# Patient Record
Sex: MALE | Race: WHITE | Employment: OTHER | URBAN - METROPOLITAN AREA
[De-identification: names, ages, dates, MRNs, and addresses within clinical notes are randomized per-mention and may not be internally consistent; named-entity substitution may affect disease eponyms.]

---

## 2017-07-10 ENCOUNTER — HOSPITAL ENCOUNTER (EMERGENCY)
Age: 77
Discharge: HOME | End: 2017-07-10
Attending: EMERGENCY MEDICINE
Payer: MEDICARE

## 2017-07-10 ENCOUNTER — APPOINTMENT (OUTPATIENT)
Dept: GENERAL RADIOLOGY | Age: 77
End: 2017-07-10
Attending: PHYSICIAN ASSISTANT
Payer: MEDICARE

## 2017-07-10 VITALS
RESPIRATION RATE: 16 BRPM | TEMPERATURE: 97.6 F | HEART RATE: 66 BPM | OXYGEN SATURATION: 94 % | DIASTOLIC BLOOD PRESSURE: 70 MMHG | SYSTOLIC BLOOD PRESSURE: 152 MMHG | WEIGHT: 220 LBS | HEIGHT: 70 IN | BODY MASS INDEX: 31.5 KG/M2

## 2017-07-10 DIAGNOSIS — J20.9 ACUTE BRONCHITIS, UNSPECIFIED ORGANISM: Primary | ICD-10-CM

## 2017-07-10 LAB
ANION GAP BLD CALC-SCNC: 11 MMOL/L (ref 3–18)
BASOPHILS # BLD AUTO: 0 K/UL (ref 0–0.06)
BASOPHILS # BLD: 0 % (ref 0–2)
BNP SERPL-MCNC: 1242 PG/ML (ref 0–1800)
BUN SERPL-MCNC: 16 MG/DL (ref 7–18)
BUN/CREAT SERPL: 15 (ref 12–20)
CALCIUM SERPL-MCNC: 8.8 MG/DL (ref 8.5–10.1)
CHLORIDE SERPL-SCNC: 105 MMOL/L (ref 100–108)
CK MB CFR SERPL CALC: 1.5 % (ref 0–4)
CK MB SERPL-MCNC: 2.6 NG/ML (ref 5–25)
CK SERPL-CCNC: 169 U/L (ref 39–308)
CO2 SERPL-SCNC: 26 MMOL/L (ref 21–32)
CREAT SERPL-MCNC: 1.1 MG/DL (ref 0.6–1.3)
DIFFERENTIAL METHOD BLD: ABNORMAL
EOSINOPHIL # BLD: 0.1 K/UL (ref 0–0.4)
EOSINOPHIL NFR BLD: 2 % (ref 0–5)
ERYTHROCYTE [DISTWIDTH] IN BLOOD BY AUTOMATED COUNT: 13.9 % (ref 11.6–14.5)
GLUCOSE SERPL-MCNC: 92 MG/DL (ref 74–99)
HCT VFR BLD AUTO: 41.3 % (ref 36–48)
HGB BLD-MCNC: 14.4 G/DL (ref 13–16)
LYMPHOCYTES # BLD AUTO: 14 % (ref 21–52)
LYMPHOCYTES # BLD: 1.3 K/UL (ref 0.9–3.6)
MCH RBC QN AUTO: 33.3 PG (ref 24–34)
MCHC RBC AUTO-ENTMCNC: 34.9 G/DL (ref 31–37)
MCV RBC AUTO: 95.6 FL (ref 74–97)
MONOCYTES # BLD: 0.6 K/UL (ref 0.05–1.2)
MONOCYTES NFR BLD AUTO: 7 % (ref 3–10)
NEUTS SEG # BLD: 7.5 K/UL (ref 1.8–8)
NEUTS SEG NFR BLD AUTO: 77 % (ref 40–73)
PLATELET # BLD AUTO: 164 K/UL (ref 135–420)
PMV BLD AUTO: 9.8 FL (ref 9.2–11.8)
POTASSIUM SERPL-SCNC: 3.5 MMOL/L (ref 3.5–5.5)
RBC # BLD AUTO: 4.32 M/UL (ref 4.7–5.5)
SODIUM SERPL-SCNC: 142 MMOL/L (ref 136–145)
TROPONIN I SERPL-MCNC: 0.03 NG/ML (ref 0–0.06)
WBC # BLD AUTO: 9.6 K/UL (ref 4.6–13.2)

## 2017-07-10 PROCEDURE — 83880 ASSAY OF NATRIURETIC PEPTIDE: CPT | Performed by: PHYSICIAN ASSISTANT

## 2017-07-10 PROCEDURE — 99283 EMERGENCY DEPT VISIT LOW MDM: CPT

## 2017-07-10 PROCEDURE — 93005 ELECTROCARDIOGRAM TRACING: CPT

## 2017-07-10 PROCEDURE — 71020 XR CHEST PA LAT: CPT

## 2017-07-10 PROCEDURE — 82550 ASSAY OF CK (CPK): CPT | Performed by: PHYSICIAN ASSISTANT

## 2017-07-10 PROCEDURE — 80048 BASIC METABOLIC PNL TOTAL CA: CPT | Performed by: PHYSICIAN ASSISTANT

## 2017-07-10 PROCEDURE — 85025 COMPLETE CBC W/AUTO DIFF WBC: CPT | Performed by: PHYSICIAN ASSISTANT

## 2017-07-10 RX ORDER — AZITHROMYCIN 250 MG/1
TABLET, FILM COATED ORAL
Qty: 6 TAB | Refills: 0 | Status: SHIPPED | OUTPATIENT
Start: 2017-07-10 | End: 2017-07-15

## 2017-07-10 RX ORDER — ASPIRIN 325 MG
325 TABLET ORAL DAILY
Status: ON HOLD | COMMUNITY
End: 2021-01-13

## 2017-07-10 RX ORDER — CODEINE PHOSPHATE AND GUAIFENESIN 10; 100 MG/5ML; MG/5ML
5 SOLUTION ORAL
Qty: 120 ML | Refills: 0 | Status: SHIPPED | OUTPATIENT
Start: 2017-07-10 | End: 2017-10-23

## 2017-07-10 NOTE — ED PROVIDER NOTES
[address]  EMERGENCY DEPARTMENT HISTORY AND PHYSICAL EXAM       Date: 7/10/2017   Patient Name: Lorin Bradford   YOB: 1940  Medical Record Number: [id number]    History of Presenting Illness     Chief Complaint   Patient presents with    Cough        History Provided By:  patient    Additional History: 2:08 PM  Lorin Bradford is a 78 y.o. male who presents to the emergency department with wife C/O cough productive of sputum onset 4 days ago. Associated signs and sx include post nasal drainage and chest congestion. Pt explains he was out in heat last week and reports he developed a productive cough 4 days ago with postnasal drainage. Pt called PCP and stated he felt like there was an elephant on his chest and was told to go to ED. Pt has had MI in past, but reports that this does not feel cardiac in origin. Denies needing to have used inhalers except for one occasion. Pt reports an allergy to Percocet. Normal stress test last year, per pt. PMHx of HTN, HLD, Cancer, Arthritis, Left knee DJD, and CAD. PSHx of Coronary stent placement, tonsillectomy, appendectomy, shoulder replacement, back surgery, elbow surgery, carpal tunnel surgery, and left partial knee replacement. Pt is a former smoker and an EtOH user. Pt denies chest pain, fever, arthralgias, myalgias, sneezing, sore throat, and any other associated signs and sx.     Primary Care Provider: Teetee Tilley MD   Specialist:    Past History     Past Medical History:   Past Medical History:   Diagnosis Date    Arthritis     CAD (coronary artery disease)     stent    Cancer ([facility] [address]     skin    Essential hypertension     H/O seasonal allergies     Hyperlipidemia     Hypertension 2004    Left knee DJD 7/5/2014        Past Surgical History:   Past Surgical History:   Procedure Laterality Date    CARDIAC SURG PROCEDURE UNLIST      stent placement    HX APPENDECTOMY      HX BACK SURGERY  2011    HX CORONARY STENT PLACEMENT  2009    HX ORTHOPAEDIC      right elbow    HX ORTHOPAEDIC      carpal tunnel     HX ORTHOPAEDIC      left partial knee    HX SHOULDER REPLACEMENT  2009    HX TONSILLECTOMY          Family History:   History reviewed. No pertinent family history. Social History:   Social History   Substance Use Topics    Smoking status: Former Smoker     Quit date: 2/25/1969    Smokeless tobacco: Never Used    Alcohol use 0.5 oz/week     1 Cans of beer per week      Comment: 5 x year        Allergies: Allergies   Allergen Reactions    Percocet [Oxycodone-Acetaminophen] Nausea and Vomiting        Review of Systems   Review of Systems   Constitutional: Negative for fever. HENT: Positive for congestion and postnasal drip. Negative for sneezing and sore throat. Respiratory: Positive for cough. Cardiovascular: Negative for chest pain. Musculoskeletal: Negative for arthralgias and myalgias. All other systems reviewed and are negative. Physical Exam  Vitals:    07/10/17 1358 07/10/17 1530   BP: 134/68 152/70   Pulse: 76 66   Resp: 16 16   Temp: 97.6 °F (36.4 °C)    SpO2: 96% 94%   Weight: 99.8 kg (220 lb)    Height: 5' 10\" (1.778 m)        Physical Exam   Nursing note and vitals reviewed. Vital signs and nursing notes reviewed. CONSTITUTIONAL: Alert. Well-appearing; well-nourished; in no apparent distress. HEAD: Normocephalic; atraumatic. EYES: PERRL; EOM's intact. No nystagmus. Conjunctiva clear. ENT: TM's normal. External ear normal. Normal nose; no rhinorrhea. Normal pharynx. Tonsils not enlarged without exudate. Moist mucus membranes. NECK: Supple; FROM without difficulty, non-tender; no cervical lymphadenopathy. CV: Normal S1, S2; no murmurs, rubs, or gallops. No chest wall tenderness. RESPIRATORY: Normal chest excursion with respiration; breath sounds clear and equal bilaterally; no wheezes, rhonchi, or rales.   GI: Normal bowel sounds; non-distended; non-tender; no guarding or rigidity; no palpable organomegaly. No CVA tenderness. BACK:  No evidence of trauma or deformity. Non-tender to palpation. FROM without difficulty. Negative straight leg raise bilaterally. EXT: Normal ROM in all four extremities; non-tender to palpation. No edema. Normal cap refill. SKIN: Normal for age and race; warm; dry; good turgor; no apparent lesions or exudate. NEURO: A & O x3. Cranial nerves 2-12 intact. Motor 5/5 bilaterally. Sensation intact. PSYCH:  Mood and affect appropriate. Diagnostic Study Results     Labs -      No results found for this or any previous visit (from the past 12 hour(s)). Radiologic Studies -  The following have been ordered and reviewed:  XR CHEST PA LAT   Final Result   IMPRESSION:     1. Cardiomegaly with nonspecific bilateral lower lung interstitial markings and  small pericardial effusion. Differential considerations include reactive versus  infectious inflammatory bronchointerstitial process. As read by the radiologist.         Medical Decision Making   I am the first provider for this patient. I reviewed the vital signs, available nursing notes, past medical history, past surgical history, family history and social history. Vital Signs-Reviewed the patient's vital signs. No data found. Pulse Oximetry Analysis - Normal 96% on room air     EKG interpretation: (Preliminary)  2:38 PM   Sinus rhythm with 1st degree AV block at 67 bpm. RBBB. Unchanged from 6/17/14. EKG read by Remington Craven MD and [employer], PA-C at 2:40 PM     Old Medical Records: Nursing notes. Procedures:   Procedures    ED Course:  2:08 PM  Initial assessment performed. The patients presenting problems have been discussed, and they are in agreement with the care plan formulated and outlined with them. I have encouraged them to ask questions as they arise throughout their visit.     Medications Given in the ED:  Medications - No data to display    Discharge Note:  3:55 PM  Pt has been reexamined. Patient has no new complaints, changes, or physical findings. Care plan outlined and precautions discussed. Results were reviewed with the patient. All medications were reviewed with the patient; will d/c home with Cheratussin and Zithromax. All of pt's questions and concerns were addressed. Patient was instructed and agrees to follow up with PCP, as well as to return to the ED upon further deterioration. Patient is ready to go home. Diagnosis   Clinical Impression:   1. Acute bronchitis, unspecified organism         Follow-up Information     Follow up With Details Comments Contact Info    Lara Franco MD Schedule an appointment as soon as possible for a visit For [place] [id number] [facility] [address]      [facility] EMERGENCY DEPT Go to As needed, If symptoms worsen [address] [id number]  [phone]          Discharge Medication List as of 7/10/2017  3:57 PM      START taking these medications    Details   guaiFENesin-codeine (CHERATUSSIN AC) 100-10 mg/5 mL solution Take 5 mL by mouth three (3) times daily as needed for Cough. Max Daily Amount: 15 mL. , Print, Disp-120 mL, R-0      azithromycin (ZITHROMAX Z-BRYCE) 250 mg tablet Use per pack instructions. , Print, Disp-6 Tab, R-0         CONTINUE these medications which have NOT CHANGED    Details   aspirin (ASPIRIN) 325 mg tablet Take 325 mg by mouth daily. , Historical Med      fish oil-omega-3 fatty acids 340-1,000 mg capsule Take 1 Cap by mouth daily. , Historical Med      rosuvastatin (CRESTOR) 10 mg tablet Take 10 mg by mouth nightly. Historical Med, 10 mg      multivitamin (ONE A DAY) tablet Take 1 Tab by mouth daily. Historical Med, 1 Tab      valsartan-hydrochlorothiazide (DIOVAN HCT) 160-25 mg per tablet Take 1 Tab by mouth daily. Historical Med, 1 Tab             _______________________________   Attestations:    This note is prepared by Joanna Jo, acting as a Scribe for Oracio Mcclure PA-C on 2:02 PM on 7/10/2017. Oracio Mcclure PA-C: The scribe's documentation has been prepared under my direction and personally reviewed by me in its entirety.   _______________________________

## 2017-07-10 NOTE — DISCHARGE INSTRUCTIONS
Bronchitis: Care Instructions  Your Care Instructions    Bronchitis is inflammation of the bronchial tubes, which carry air to the lungs. The tubes swell and produce mucus, or phlegm. The mucus and inflamed bronchial tubes make you cough. You may have trouble breathing. Most cases of bronchitis are caused by viruses like those that cause colds. Antibiotics usually do not help and they may be harmful. Bronchitis usually develops rapidly and lasts about 2 to 3 weeks in otherwise healthy people. Follow-up care is a key part of your treatment and safety. Be sure to make and go to all appointments, and call your doctor if you are having problems. It's also a good idea to know your test results and keep a list of the medicines you take. How can you care for yourself at home? · Take all medicines exactly as prescribed. Call your doctor if you think you are having a problem with your medicine. · Get some extra rest.  · Take an over-the-counter pain medicine, such as acetaminophen (Tylenol), ibuprofen (Advil, Motrin), or naproxen (Aleve) to reduce fever and relieve body aches. Read and follow all instructions on the label. · Do not take two or more pain medicines at the same time unless the doctor told you to. Many pain medicines have acetaminophen, which is Tylenol. Too much acetaminophen (Tylenol) can be harmful. · Take an over-the-counter cough medicine that contains dextromethorphan to help quiet a dry, hacking cough so that you can sleep. Avoid cough medicines that have more than one active ingredient. Read and follow all instructions on the label. · Breathe moist air from a humidifier, hot shower, or sink filled with hot water. The heat and moisture will thin mucus so you can cough it out. · Do not smoke. Smoking can make bronchitis worse. If you need help quitting, talk to your doctor about stop-smoking programs and medicines. These can increase your chances of quitting for good.   When should you call for help? Call 911 anytime you think you may need emergency care. For example, call if:  · You have severe trouble breathing. Call your doctor now or seek immediate medical care if:  · You have new or worse trouble breathing. · You cough up dark brown or bloody mucus (sputum). · You have a new or higher fever. · You have a new rash. Watch closely for changes in your health, and be sure to contact your doctor if:  · You cough more deeply or more often, especially if you notice more mucus or a change in the color of your mucus. · You are not getting better as expected. Where can you learn more? Go to http://juan-jojo.info/. Enter H333 in the search box to learn more about \"Bronchitis: Care Instructions. \"  Current as of: March 25, 2017  Content Version: 11.3  © 2739-8910 [employer]. Care instructions adapted under license by [facility] (which disclaims liability or warranty for this information). If you have questions about a medical condition or this instruction, always ask your healthcare professional. [address] any warranty or liability for your use of this information.

## 2017-07-10 NOTE — ED TRIAGE NOTES
Pt states out in heat last week, pt reports he has developed a productive cough on Thursday, pt states having postnasal drainage

## 2017-07-11 LAB
ATRIAL RATE: 67 BPM
CALCULATED P AXIS, ECG09: 50 DEGREES
CALCULATED R AXIS, ECG10: -57 DEGREES
CALCULATED T AXIS, ECG11: 81 DEGREES
DIAGNOSIS, 93000: NORMAL
P-R INTERVAL, ECG05: 212 MS
Q-T INTERVAL, ECG07: 474 MS
QRS DURATION, ECG06: 154 MS
QTC CALCULATION (BEZET), ECG08: 500 MS
VENTRICULAR RATE, ECG03: 67 BPM

## 2017-10-23 ENCOUNTER — APPOINTMENT (OUTPATIENT)
Dept: GENERAL RADIOLOGY | Age: 77
End: 2017-10-23
Attending: EMERGENCY MEDICINE
Payer: COMMERCIAL

## 2017-10-23 ENCOUNTER — HOSPITAL ENCOUNTER (EMERGENCY)
Age: 77
Discharge: HOME | End: 2017-10-23
Attending: EMERGENCY MEDICINE
Payer: COMMERCIAL

## 2017-10-23 VITALS
HEIGHT: 70 IN | BODY MASS INDEX: 32.21 KG/M2 | TEMPERATURE: 99.7 F | HEART RATE: 87 BPM | OXYGEN SATURATION: 96 % | WEIGHT: 225 LBS | RESPIRATION RATE: 20 BRPM | DIASTOLIC BLOOD PRESSURE: 84 MMHG | SYSTOLIC BLOOD PRESSURE: 151 MMHG

## 2017-10-23 DIAGNOSIS — J18.0 BRONCHOPNEUMONIA: Primary | ICD-10-CM

## 2017-10-23 LAB
ALBUMIN SERPL-MCNC: 3.9 G/DL (ref 3.4–5)
ALBUMIN/GLOB SERPL: 1.2 {RATIO} (ref 0.8–1.7)
ALP SERPL-CCNC: 41 U/L (ref 45–117)
ALT SERPL-CCNC: 41 U/L (ref 16–61)
ANION GAP SERPL CALC-SCNC: 12 MMOL/L (ref 3–18)
AST SERPL-CCNC: 39 U/L (ref 15–37)
BASOPHILS # BLD: 0 K/UL (ref 0–0.06)
BASOPHILS NFR BLD: 0 % (ref 0–2)
BILIRUB SERPL-MCNC: 3.1 MG/DL (ref 0.2–1)
BNP SERPL-MCNC: 975 PG/ML (ref 0–1800)
BUN SERPL-MCNC: 19 MG/DL (ref 7–18)
BUN/CREAT SERPL: 17 (ref 12–20)
CALCIUM SERPL-MCNC: 9.1 MG/DL (ref 8.5–10.1)
CHLORIDE SERPL-SCNC: 103 MMOL/L (ref 100–108)
CK MB CFR SERPL CALC: 0.5 % (ref 0–4)
CK MB SERPL-MCNC: 2.4 NG/ML (ref 5–25)
CK SERPL-CCNC: 456 U/L (ref 39–308)
CO2 SERPL-SCNC: 23 MMOL/L (ref 21–32)
CREAT SERPL-MCNC: 1.12 MG/DL (ref 0.6–1.3)
DIFFERENTIAL METHOD BLD: ABNORMAL
EOSINOPHIL # BLD: 0.1 K/UL (ref 0–0.4)
EOSINOPHIL NFR BLD: 1 % (ref 0–5)
ERYTHROCYTE [DISTWIDTH] IN BLOOD BY AUTOMATED COUNT: 14 % (ref 11.6–14.5)
GLOBULIN SER CALC-MCNC: 3.3 G/DL (ref 2–4)
GLUCOSE SERPL-MCNC: 117 MG/DL (ref 74–99)
HCT VFR BLD AUTO: 40.7 % (ref 36–48)
HGB BLD-MCNC: 14.3 G/DL (ref 13–16)
LYMPHOCYTES # BLD: 0.3 K/UL (ref 0.9–3.6)
LYMPHOCYTES NFR BLD: 4 % (ref 21–52)
MAGNESIUM SERPL-MCNC: 1.9 MG/DL (ref 1.6–2.6)
MCH RBC QN AUTO: 33.3 PG (ref 24–34)
MCHC RBC AUTO-ENTMCNC: 35.1 G/DL (ref 31–37)
MCV RBC AUTO: 94.9 FL (ref 74–97)
MONOCYTES # BLD: 0.7 K/UL (ref 0.05–1.2)
MONOCYTES NFR BLD: 8 % (ref 3–10)
NEUTS SEG # BLD: 7.1 K/UL (ref 1.8–8)
NEUTS SEG NFR BLD: 87 % (ref 40–73)
PLATELET # BLD AUTO: 141 K/UL (ref 135–420)
PMV BLD AUTO: 9.5 FL (ref 9.2–11.8)
POTASSIUM SERPL-SCNC: 3.3 MMOL/L (ref 3.5–5.5)
PROT SERPL-MCNC: 7.2 G/DL (ref 6.4–8.2)
RBC # BLD AUTO: 4.29 M/UL (ref 4.7–5.5)
SODIUM SERPL-SCNC: 138 MMOL/L (ref 136–145)
TROPONIN I SERPL-MCNC: 0.09 NG/ML (ref 0–0.06)
WBC # BLD AUTO: 8.2 K/UL (ref 4.6–13.2)

## 2017-10-23 PROCEDURE — 71020 XR CHEST PA LAT: CPT

## 2017-10-23 PROCEDURE — 82550 ASSAY OF CK (CPK): CPT | Performed by: EMERGENCY MEDICINE

## 2017-10-23 PROCEDURE — 74011250637 HC RX REV CODE- 250/637: Performed by: EMERGENCY MEDICINE

## 2017-10-23 PROCEDURE — 83735 ASSAY OF MAGNESIUM: CPT | Performed by: EMERGENCY MEDICINE

## 2017-10-23 PROCEDURE — 85025 COMPLETE CBC W/AUTO DIFF WBC: CPT | Performed by: EMERGENCY MEDICINE

## 2017-10-23 PROCEDURE — 99283 EMERGENCY DEPT VISIT LOW MDM: CPT

## 2017-10-23 PROCEDURE — 83880 ASSAY OF NATRIURETIC PEPTIDE: CPT | Performed by: EMERGENCY MEDICINE

## 2017-10-23 PROCEDURE — 80053 COMPREHEN METABOLIC PANEL: CPT | Performed by: EMERGENCY MEDICINE

## 2017-10-23 RX ORDER — LEVOFLOXACIN 750 MG/1
750 TABLET ORAL
Status: COMPLETED | OUTPATIENT
Start: 2017-10-23 | End: 2017-10-23

## 2017-10-23 RX ORDER — SODIUM CHLORIDE 0.9 % (FLUSH) 0.9 %
5-10 SYRINGE (ML) INJECTION AS NEEDED
Status: DISCONTINUED | OUTPATIENT
Start: 2017-10-23 | End: 2017-10-24 | Stop reason: HOSPADM

## 2017-10-23 RX ORDER — BENZONATATE 100 MG/1
100 CAPSULE ORAL
Qty: 30 CAP | Refills: 0 | Status: SHIPPED | OUTPATIENT
Start: 2017-10-23 | End: 2017-10-30

## 2017-10-23 RX ORDER — HYDROCODONE POLISTIREX AND CHLORPHENIRAMINE POLISTIREX 10; 8 MG/5ML; MG/5ML
5 SUSPENSION, EXTENDED RELEASE ORAL
Qty: 60 ML | Refills: 0 | Status: SHIPPED | OUTPATIENT
Start: 2017-10-23 | End: 2019-01-02

## 2017-10-23 RX ORDER — LEVOFLOXACIN 500 MG/1
500 TABLET, FILM COATED ORAL DAILY
Qty: 10 TAB | Refills: 0 | Status: SHIPPED | OUTPATIENT
Start: 2017-10-23 | End: 2017-11-02

## 2017-10-23 RX ORDER — SODIUM CHLORIDE 0.9 % (FLUSH) 0.9 %
5-10 SYRINGE (ML) INJECTION EVERY 8 HOURS
Status: DISCONTINUED | OUTPATIENT
Start: 2017-10-23 | End: 2017-10-24 | Stop reason: HOSPADM

## 2017-10-23 RX ADMIN — LEVOFLOXACIN 750 MG: 750 TABLET, FILM COATED ORAL at 23:20

## 2017-10-23 NOTE — ED TRIAGE NOTES
Patient with complaints of cough and congestion for a couple days. Patient states that he has a history of pneumonia. Sepsis Screening completed    (  )Patient meets SIRS criteria. (x  )Patient does not meet SIRS criteria.       SIRS Criteria is achieved when two or more of the following are present   Temperature < 96.8°F (36°C) or > 100.9°F (38.3°C)   Heart Rate > 90 beats per minute   Respiratory Rate > 20 breaths per minute   WBC count > 12,000 or <4,000 or > 10% bands

## 2017-10-24 NOTE — DISCHARGE INSTRUCTIONS
Pneumonia: Care Instructions  Your Care Instructions    Pneumonia is an infection of the lungs. Most cases are caused by infections from bacteria or viruses. Pneumonia may be mild or very severe. If it is caused by bacteria, you will be treated with antibiotics. It may take a few weeks to a few months to recover fully from pneumonia, depending on how sick you were and whether your overall health is good. Follow-up care is a key part of your treatment and safety. Be sure to make and go to all appointments, and call your doctor if you are having problems. Its also a good idea to know your test results and keep a list of the medicines you take. How can you care for yourself at home? · Take your antibiotics exactly as directed. Do not stop taking the medicine just because you are feeling better. You need to take the full course of antibiotics. · Take your medicines exactly as prescribed. Call your doctor if you think you are having a problem with your medicine. · Get plenty of rest and sleep. You may feel weak and tired for a while, but your energy level will improve with time. · To prevent dehydration, drink plenty of fluids, enough so that your urine is light yellow or clear like water. Choose water and other caffeine-free clear liquids until you feel better. If you have kidney, heart, or liver disease and have to limit fluids, talk with your doctor before you increase the amount of fluids you drink. · Take care of your cough so you can rest. A cough that brings up mucus from your lungs is common with pneumonia. It is one way your body gets rid of the infection. But if coughing keeps you from resting or causes severe fatigue and chest-wall pain, talk to your doctor. He or she may suggest that you take a medicine to reduce the cough. · Use a vaporizer or humidifier to add moisture to your bedroom. Follow the directions for cleaning the machine. · Do not smoke or allow others to smoke around you.  Smoke will make your cough last longer. If you need help quitting, talk to your doctor about stop-smoking programs and medicines. These can increase your chances of quitting for good. · Take an over-the-counter pain medicine, such as acetaminophen (Tylenol), ibuprofen (Advil, Motrin), or naproxen (Aleve). Read and follow all instructions on the label. · Do not take two or more pain medicines at the same time unless the doctor told you to. Many pain medicines have acetaminophen, which is Tylenol. Too much acetaminophen (Tylenol) can be harmful. · If you were given a spirometer to measure how well your lungs are working, use it as instructed. This can help your doctor tell how your recovery is going. · To prevent pneumonia in the future, talk to your doctor about getting a flu vaccine (once a year) and a pneumococcal vaccine (one time only for most people). When should you call for help? Call 911 anytime you think you may need emergency care. For example, call if:  · You have severe trouble breathing. Call your doctor now or seek immediate medical care if:  · You cough up dark brown or bloody mucus (sputum). · You have new or worse trouble breathing. · You are dizzy or lightheaded, or you feel like you may faint. Watch closely for changes in your health, and be sure to contact your doctor if:  · You have a new or higher fever. · You are coughing more deeply or more often. · You are not getting better after 2 days (48 hours). · You do not get better as expected. Where can you learn more? Go to http://juan-jojo.info/. Enter 01.84.63.10.33 in the search box to learn more about \"Pneumonia: Care Instructions. \"  Current as of: March 25, 2017  Content Version: 11.3  © 3214-3377 [employer]. Care instructions adapted under license by [employer] (which disclaims liability or warranty for this information).  If you have questions about a medical condition or this instruction, always ask your healthcare professional. [place] [address] any warranty or liability for your use of this information.

## 2017-10-24 NOTE — ED NOTES
Bedside and Verbal shift received from LALIT Bell RN (offgoing nurse). Report included the following information SBAR, Kardex, ED Summary, Procedure Summary, Intake/Output, MAR, Accordion, Recent Results, Med Rec Status and Alarm Parameters . Pt stable and resting in bed quietly. Call bell within reach.

## 2017-10-24 NOTE — ED PROVIDER NOTES
[address]  EMERGENCY DEPARTMENT HISTORY AND PHYSICAL EXAM       Date: 10/23/2017   Patient Name: Elle Wiseman   YOB: 1940  Medical Record Number: [id number]    History of Presenting Illness     Chief Complaint   Patient presents with    Cough        History Provided By:  patient    Additional History: 9:13 PM   Elle Wiseman is a 78 y.o. male with PMHx of PNA presents to the emergency department C/O thick white productive cough onset a 2 days ago. Associated sxs include SOB, fatigue, generalized myalgias, and congestion. Pt saw cardiology 3 days ago with no concerns. Pt has not gotten PNA vaccination. PMHx includes HTN, HLD, and CAD. PSHx includes cardiac stent (2009). SHx includes past tobacco use (last use 1969) and occasional EtOH use. Pt denies fever, chills, abd pain, n/v, leg swelling, recent sick contact, recent rx changes, PMHx of DVT, SHx of drug use, FHx of asthma and any other symptoms or complaints. Written by Johnathon Og ED Scribe, as dictated by Chastity. 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 No pertinent family history.      Social History:   Social History   Substance Use Topics    Smoking status: Former Smoker     Quit date: 2/25/1969    Smokeless tobacco: Never Used    Alcohol use 0.5 oz/week     1 Cans of beer per week      Comment: 5 x year        Allergies: Allergies   Allergen Reactions    Percocet [Oxycodone-Acetaminophen] Nausea and Vomiting        Review of Systems   Review of Systems   Constitutional: Positive for fatigue. Negative for chills and fever. HENT: Positive for congestion. Respiratory: Positive for cough (thick white productive) and shortness of breath. Cardiovascular: Negative for leg swelling. Gastrointestinal: Negative for abdominal pain, nausea and vomiting. Musculoskeletal: Positive for myalgias (generalized). All other systems reviewed and are negative. Physical Exam  Vitals:    10/23/17 1937 10/23/17 2302   BP: 145/79 151/84   Pulse: 80 87   Resp: 20 20   Temp: 99.7 °F (37.6 °C)    SpO2: 95% 96%   Weight: 102.1 kg (225 lb)    Height: 5' 10\" (1.778 m)        Physical Exam   Constitutional: He is oriented to person, place, and time. He appears well-developed and well-nourished. Non-toxic appearance. He does not appear ill. No distress. HENT:   Head: Normocephalic and atraumatic. Right Ear: External ear normal.   Left Ear: External ear normal.   Mouth/Throat: Oropharynx is clear and moist. No oropharyngeal exudate. Eyes: Conjunctivae and EOM are normal. Pupils are equal, round, and reactive to light. No scleral icterus. No pallor   Neck: Normal range of motion. Neck supple. No JVD present. No tracheal deviation present. No thyromegaly present. Cardiovascular: Normal rate, regular rhythm and normal heart sounds. Pulmonary/Chest: Effort normal and breath sounds normal. No stridor. No respiratory distress. Audible wet cough    Abdominal: Soft. Bowel sounds are normal. He exhibits no distension. There is no tenderness. There is no rebound and no guarding. Musculoskeletal: Normal range of motion. He exhibits no edema or tenderness. No soft tissue injuries   Lymphadenopathy:     He has no cervical adenopathy. Neurological: He is alert and oriented to person, place, and time. He has normal reflexes. No cranial nerve deficit. Coordination normal.   Skin: Skin is warm and dry. No rash noted. He is not diaphoretic. No erythema. Scattered old faded tattoos    Psychiatric: He has a normal mood and affect. His behavior is normal. Judgment and thought content normal.   Nursing note and vitals reviewed. Diagnostic Study Results     Labs -      Recent Results (from the past 12 hour(s))   CBC WITH AUTOMATED DIFF    Collection Time: 10/23/17 10:10 PM   Result Value Ref Range    WBC 8.2 4.6 - 13.2 K/uL    RBC 4.29 (L) 4.70 - 5.50 M/uL    HGB 14.3 13.0 - 16.0 g/dL    HCT 40.7 36.0 - 48.0 %    MCV 94.9 74.0 - 97.0 FL    MCH 33.3 24.0 - 34.0 PG    MCHC 35.1 31.0 - 37.0 g/dL    RDW 14.0 11.6 - 14.5 %    PLATELET 415 189 - 273 K/uL    MPV 9.5 9.2 - 11.8 FL    NEUTROPHILS 87 (H) 40 - 73 %    LYMPHOCYTES 4 (L) 21 - 52 %    MONOCYTES 8 3 - 10 %    EOSINOPHILS 1 0 - 5 %    BASOPHILS 0 0 - 2 %    ABS. NEUTROPHILS 7.1 1.8 - 8.0 K/UL    ABS. LYMPHOCYTES 0.3 (L) 0.9 - 3.6 K/UL    ABS. MONOCYTES 0.7 0.05 - 1.2 K/UL    ABS. EOSINOPHILS 0.1 0.0 - 0.4 K/UL    ABS. BASOPHILS 0.0 0.0 - 0.06 K/UL    DF AUTOMATED     METABOLIC PANEL, COMPREHENSIVE    Collection Time: 10/23/17 10:10 PM   Result Value Ref Range    Sodium 138 136 - 145 mmol/L    Potassium 3.3 (L) 3.5 - 5.5 mmol/L    Chloride 103 100 - 108 mmol/L    CO2 23 21 - 32 mmol/L    Anion gap 12 3.0 - 18 mmol/L    Glucose 117 (H) 74 - 99 mg/dL    BUN 19 (H) 7.0 - 18 MG/DL    Creatinine 1.12 0.6 - 1.3 MG/DL    BUN/Creatinine ratio 17 12 - 20      GFR est AA >60 >60 ml/min/1.73m2    GFR est non-AA >60 >60 ml/min/1.73m2    Calcium 9.1 8.5 - 10.1 MG/DL    Bilirubin, total 3.1 (H) 0.2 - 1.0 MG/DL    ALT (SGPT) 41 16 - 61 U/L    AST (SGOT) 39 (H) 15 - 37 U/L    Alk.  phosphatase 41 (L) 45 - 117 U/L    Protein, total 7.2 6.4 - 8.2 g/dL    Albumin 3.9 3.4 - 5.0 g/dL    Globulin 3.3 2.0 - 4.0 g/dL    A-G Ratio 1.2 0.8 - 1.7     CARDIAC PANEL,(CK, CKMB & TROPONIN)    Collection Time: 10/23/17 10:10 PM   Result Value Ref Range     (H) 39 - 308 U/L    CK - MB 2.4 <3.6 ng/ml    CK-MB Index 0.5 0.0 - 4.0 %    Troponin-I, Qt. 0.09 (H) 0.00 - 0.06 NG/ML   MAGNESIUM    Collection Time: 10/23/17 10:10 PM   Result Value Ref Range    Magnesium 1.9 1.6 - 2.6 mg/dL   NT-PRO BNP    Collection Time: 10/23/17 10:10 PM   Result Value Ref Range    NT pro- 0 - 1800 PG/ML       Radiologic Studies -  The following have been ordered and reviewed:  XR CHEST PA LAT    (Results Pending)     RADIOLOGY FINDINGS  chest X-ray shows Mild cardiomegaly with scattered hazy appearance to bilateral bases   Pending review by Radiologist  Recorded by Michelle Ingram ED Scribe, as dictated by Shayla Blanca. Cindy Kingston MD        Medical Decision Making   I am the first provider for this patient. I reviewed the vital signs, available nursing notes, past medical history, past surgical history, family history and social history. Vital Signs-Reviewed the patient's vital signs. Patient Vitals for the past 12 hrs:   Temp Pulse Resp BP SpO2   10/23/17 2302 - 87 20 151/84 96 %   10/23/17 1937 99.7 °F (37.6 °C) 80 20 145/79 95 %     Old Medical Records: Nursing notes. Provider Notes: Edema versus early interstitial infiltrate      Procedures:   Procedures    ED Course:  9:13 PM  Initial assessment performed. The patients presenting problems have been discussed, and they are in agreement with the care plan formulated and outlined with them. I have encouraged them to ask questions as they arise throughout their visit.     Medications Given in the ED:  Medications   sodium chloride (NS) flush 5-10 mL (not administered)   sodium chloride (NS) flush 5-10 mL (not administered)   levoFLOXacin (LEVAQUIN) tablet 750 mg (not administered)       Discharge Note:  11:01 PM Pt has been reexamined. Patient has no new complaints, changes, or physical findings. Care plan outlined and precautions discussed. Results were reviewed with the patient. All of pt's questions and concerns were addressed. Patient was instructed and agrees to follow up with PCP, as well as to return to the ED upon further deterioration. Patient is ready to go home. Diagnosis   Clinical Impression:   1. Bronchopneumonia           Follow-up Information     Follow up With Details Comments Contact Info    Froylan Carranza MD Schedule an appointment as soon as possible for a visit in 2 days for primary care follow up  [id number] [facility] [address]      [facility] EMERGENCY DEPT Go to As needed, If symptoms worsen [address] [place] [id number]  [phone]          Current Discharge Medication List      START taking these medications    Details   benzonatate (TESSALON PERLES) 100 mg capsule Take 1 Cap by mouth three (3) times daily as needed for Cough for up to 7 days. Qty: 30 Cap, Refills: 0      chlorpheniramine-HYDROcodone (TUSSIONEX PENNKINETIC ER) 10-8 mg/5 mL suspension Take 5 mL by mouth every twelve (12) hours as needed for Cough. Max Daily Amount: 10 mL. Qty: 60 mL, Refills: 0      levoFLOXacin (LEVAQUIN) 500 mg tablet Take 1 Tab by mouth daily for 10 days. Qty: 10 Tab, Refills: 0         CONTINUE these medications which have NOT CHANGED    Details   aspirin (ASPIRIN) 325 mg tablet Take 325 mg by mouth daily. fish oil-omega-3 fatty acids 340-1,000 mg capsule Take 1 Cap by mouth daily. rosuvastatin (CRESTOR) 10 mg tablet Take 10 mg by mouth nightly. multivitamin (ONE A DAY) tablet Take 1 Tab by mouth daily. valsartan-hydrochlorothiazide (DIOVAN HCT) 160-25 mg per tablet Take 1 Tab by mouth daily. _______________________________   Attestations:      This note is prepared by Bia Lira , acting as a Scribe for Brynn Simmons. Mayur Chowdhury MD  on 9:13 PM on 10/23/2017 . Rafat Guadarrama. Mayur Chowdhury MD : The scribe's documentation has been prepared under my direction and personally reviewed by me in its entirety.   _______________________________

## 2018-08-20 ENCOUNTER — HOSPITAL ENCOUNTER (OUTPATIENT)
Dept: NON INVASIVE DIAGNOSTICS | Age: 78
Discharge: HOME | End: 2018-08-20
Attending: INTERNAL MEDICINE
Payer: MEDICARE

## 2018-08-20 VITALS
SYSTOLIC BLOOD PRESSURE: 127 MMHG | HEIGHT: 70 IN | DIASTOLIC BLOOD PRESSURE: 76 MMHG | BODY MASS INDEX: 30.78 KG/M2 | WEIGHT: 215 LBS

## 2018-08-20 DIAGNOSIS — I50.9 CHF (CONGESTIVE HEART FAILURE) (HCC): ICD-10-CM

## 2018-08-20 LAB
ECHO AO ARCH DIAM: 3.3 CM
ECHO AO ASC DIAM: 3.55 CM
ECHO AO ROOT DIAM: 3.68 CM
ECHO AV AREA PEAK VELOCITY: 1.3 CM2
ECHO AV AREA VTI: 1.2 CM2
ECHO AV AREA/BSA PEAK VELOCITY: 0.6 CM2/M2
ECHO AV AREA/BSA VTI: 0.5 CM2/M2
ECHO AV CUSP MM: 2.4 CM
ECHO AV MEAN GRADIENT: 2.8 MMHG
ECHO AV PEAK GRADIENT: 4.7 MMHG
ECHO AV PEAK VELOCITY: 108.82 CM/S
ECHO AV VTI: 19.94 CM
ECHO IVC PROX: 1.97 CM
ECHO LA MAJOR AXIS: 3.9 CM
ECHO LA VOL 2C: 60.79 ML (ref 18–58)
ECHO LA VOL 4C: 50.63 ML (ref 18–58)
ECHO LA VOL BP: 70.85 ML (ref 18–58)
ECHO LA VOL/BSA BIPLANE: 32.92 ML/M2
ECHO LA VOLUME INDEX A2C: 28.24 ML/M2
ECHO LA VOLUME INDEX A4C: 23.52 ML/M2
ECHO LV E' LATERAL VELOCITY: 0.07 CM/S
ECHO LV E' SEPTAL VELOCITY: 0.04 CM/S
ECHO LV EDV A2C: 226.9 ML
ECHO LV EDV A4C: 237.3 ML
ECHO LV EDV BP: 236.1 ML (ref 67–155)
ECHO LV EDV INDEX A4C: 110.2 ML/M2
ECHO LV EDV INDEX BP: 109.7 ML/M2
ECHO LV EDV NDEX A2C: 105.4 ML/M2
ECHO LV EJECTION FRACTION A2C: 36 %
ECHO LV EJECTION FRACTION A4C: 42 %
ECHO LV EJECTION FRACTION BIPLANE: 39.2 % (ref 55–100)
ECHO LV ESV A2C: 146.1 ML
ECHO LV ESV A4C: 136.9 ML
ECHO LV ESV BP: 143.6 ML (ref 22–58)
ECHO LV ESV INDEX A2C: 67.9 ML/M2
ECHO LV ESV INDEX A4C: 63.6 ML/M2
ECHO LV ESV INDEX BP: 66.7 ML/M2
ECHO LV INTERNAL DIMENSION DIASTOLIC: 5.02 CM (ref 4.2–5.9)
ECHO LV INTERNAL DIMENSION SYSTOLIC: 4.56 CM
ECHO LV IVSD: 1.31 CM (ref 0.6–1)
ECHO LV MASS 2D: 313 G (ref 88–224)
ECHO LV MASS INDEX 2D: 145.4 G/M2
ECHO LV POSTERIOR WALL DIASTOLIC: 1.28 CM (ref 0.6–1)
ECHO LVOT DIAM: 2.02 CM
ECHO LVOT PEAK GRADIENT: 0.7 MMHG
ECHO LVOT PEAK VELOCITY: 42.28 CM/S
ECHO LVOT VTI: 7.19 CM
ECHO MV A VELOCITY: 87.27 CM/S
ECHO MV AREA PHT: 2.7 CM2
ECHO MV AREA PISA: 0.2 CM2
ECHO MV E DECELERATION TIME (DT): 281.6 MS
ECHO MV E VELOCITY: 0.28 CM/S
ECHO MV E/A RATIO: 0
ECHO MV E/E' LATERAL: 4
ECHO MV E/E' RATIO (AVERAGED): 5.5
ECHO MV E/E' SEPTAL: 7
ECHO MV EROA PISA: 0.2 CM2
ECHO MV PRESSURE HALF TIME (PHT): 81.7 MS
ECHO MV REGURGITANT PEAK GRADIENT: 112.2 MMHG
ECHO MV REGURGITANT PEAK VELOCITY: 529.59 CM/S
ECHO MV REGURGITANT RADIUS PISA: 0.59 CM
ECHO MV REGURGITANT VOLUME: 20.67 CC
ECHO MV REGURGITANT VTIA: 131.64 CM
ECHO RA AREA 4C: 19.6 CM2
ECHO RV INTERNAL DIMENSION: 3.64 CM
ECHO RV TAPSE: 2.5 CM (ref 1.5–2)

## 2018-08-20 PROCEDURE — 93306 TTE W/DOPPLER COMPLETE: CPT

## 2018-10-12 ENCOUNTER — HOSPITAL ENCOUNTER (OUTPATIENT)
Dept: CT IMAGING | Age: 78
Discharge: HOME | End: 2018-10-12
Attending: INTERNAL MEDICINE
Payer: MEDICARE

## 2018-10-12 DIAGNOSIS — R42 DIZZINESS AND GIDDINESS: ICD-10-CM

## 2018-10-12 LAB — CREAT UR-MCNC: 1.4 MG/DL (ref 0.6–1.3)

## 2018-10-12 PROCEDURE — 74011636320 HC RX REV CODE- 636/320: Performed by: INTERNAL MEDICINE

## 2018-10-12 PROCEDURE — 70470 CT HEAD/BRAIN W/O & W/DYE: CPT

## 2018-10-12 PROCEDURE — 82565 ASSAY OF CREATININE: CPT

## 2018-10-12 RX ADMIN — IOPAMIDOL 80 ML: 612 INJECTION, SOLUTION INTRAVENOUS at 14:28

## 2018-12-11 ENCOUNTER — HOSPITAL ENCOUNTER (OUTPATIENT)
Dept: MRI IMAGING | Age: 78
Discharge: HOME | End: 2018-12-11
Attending: PSYCHIATRY & NEUROLOGY
Payer: MEDICARE

## 2018-12-11 DIAGNOSIS — R27.0 ATAXIA: ICD-10-CM

## 2018-12-11 PROCEDURE — 70551 MRI BRAIN STEM W/O DYE: CPT

## 2019-01-02 ENCOUNTER — HOSPITAL ENCOUNTER (EMERGENCY)
Age: 79
Discharge: HOME | End: 2019-01-02
Attending: EMERGENCY MEDICINE
Payer: MEDICARE

## 2019-01-02 ENCOUNTER — APPOINTMENT (OUTPATIENT)
Dept: VASCULAR SURGERY | Age: 79
End: 2019-01-02
Attending: PHYSICIAN ASSISTANT
Payer: MEDICARE

## 2019-01-02 ENCOUNTER — APPOINTMENT (OUTPATIENT)
Dept: GENERAL RADIOLOGY | Age: 79
End: 2019-01-02
Attending: PHYSICIAN ASSISTANT
Payer: MEDICARE

## 2019-01-02 VITALS
HEIGHT: 71 IN | TEMPERATURE: 98 F | WEIGHT: 220 LBS | BODY MASS INDEX: 30.8 KG/M2 | RESPIRATION RATE: 18 BRPM | HEART RATE: 79 BPM | DIASTOLIC BLOOD PRESSURE: 74 MMHG | SYSTOLIC BLOOD PRESSURE: 143 MMHG | OXYGEN SATURATION: 95 %

## 2019-01-02 DIAGNOSIS — M25.571 ARTHRALGIA OF RIGHT FOOT: Primary | ICD-10-CM

## 2019-01-02 DIAGNOSIS — L03.115 CELLULITIS OF RIGHT FOOT: ICD-10-CM

## 2019-01-02 PROCEDURE — 73630 X-RAY EXAM OF FOOT: CPT

## 2019-01-02 PROCEDURE — 99283 EMERGENCY DEPT VISIT LOW MDM: CPT

## 2019-01-02 PROCEDURE — 93971 EXTREMITY STUDY: CPT

## 2019-01-02 PROCEDURE — 77030036687 HC SHOE PSTOP S2SG -A

## 2019-01-02 RX ORDER — COLCHICINE 0.6 MG/1
0.6 TABLET ORAL 2 TIMES DAILY
Qty: 6 TAB | Refills: 0 | Status: SHIPPED | OUTPATIENT
Start: 2019-01-02 | End: 2019-01-05

## 2019-01-02 RX ORDER — CEPHALEXIN 500 MG/1
500 CAPSULE ORAL 4 TIMES DAILY
Qty: 40 CAP | Refills: 0 | Status: SHIPPED | OUTPATIENT
Start: 2019-01-02 | End: 2019-01-12

## 2019-01-02 RX ORDER — HYDROCODONE BITARTRATE AND ACETAMINOPHEN 5; 325 MG/1; MG/1
1 TABLET ORAL
Qty: 12 TAB | Refills: 0 | Status: SHIPPED | OUTPATIENT
Start: 2019-01-02 | End: 2021-01-14

## 2019-01-02 RX ORDER — PREDNISONE 10 MG/1
TABLET ORAL
Qty: 21 TAB | Refills: 0 | Status: ON HOLD | OUTPATIENT
Start: 2019-01-02 | End: 2021-01-13

## 2019-01-02 NOTE — DISCHARGE INSTRUCTIONS
Cellulitis: Care Instructions  Your Care Instructions    Cellulitis is a skin infection caused by bacteria, most often strep or staph. It often occurs after a break in the skin from a scrape, cut, bite, or puncture, or after a rash. Cellulitis may be treated without doing tests to find out what caused it. But your doctor may do tests, if needed, to look for a specific bacteria, like methicillin-resistant Staphylococcus aureus (MRSA). The doctor has checked you carefully, but problems can develop later. If you notice any problems or new symptoms, get medical treatment right away. Follow-up care is a key part of your treatment and safety. Be sure to make and go to all appointments, and call your doctor if you are having problems. It's also a good idea to know your test results and keep a list of the medicines you take. How can you care for yourself at home? · Take your antibiotics as directed. Do not stop taking them just because you feel better. You need to take the full course of antibiotics. · Prop up the infected area on pillows to reduce pain and swelling. Try to keep the area above the level of your heart as often as you can. · If your doctor told you how to care for your wound, follow your doctor's instructions. If you did not get instructions, follow this general advice:  ? Wash the wound with clean water 2 times a day. Don't use hydrogen peroxide or alcohol, which can slow healing. ? You may cover the wound with a thin layer of petroleum jelly, such as Vaseline, and a nonstick bandage. ? Apply more petroleum jelly and replace the bandage as needed. · Be safe with medicines. Take pain medicines exactly as directed. ? If the doctor gave you a prescription medicine for pain, take it as prescribed. ? If you are not taking a prescription pain medicine, ask your doctor if you can take an over-the-counter medicine.   To prevent cellulitis in the future  · Try to prevent cuts, scrapes, or other injuries to your skin. Cellulitis most often occurs where there is a break in the skin. · If you get a scrape, cut, mild burn, or bite, wash the wound with clean water as soon as you can to help avoid infection. Don't use hydrogen peroxide or alcohol, which can slow healing. · If you have swelling in your legs (edema), support stockings and good skin care may help prevent leg sores and cellulitis. · Take care of your feet, especially if you have diabetes or other conditions that increase the risk of infection. Wear shoes and socks. Do not go barefoot. If you have athlete's foot or other skin problems on your feet, talk to your doctor about how to treat them. When should you call for help? Call your doctor now or seek immediate medical care if:    · You have signs that your infection is getting worse, such as:  ? Increased pain, swelling, warmth, or redness. ? Red streaks leading from the area. ? Pus draining from the area. ? A fever.     · You get a rash.    Watch closely for changes in your health, and be sure to contact your doctor if:    · You do not get better as expected. Where can you learn more? Go to http://juan-jojo.info/. Susy White in the search box to learn more about \"Cellulitis: Care Instructions. \"  Current as of: April 18, 2018  Content Version: 11.8  © 5968-0167 [employer]. Care instructions adapted under license by [employer] (which disclaims liability or warranty for this information). If you have questions about a medical condition or this instruction, always ask your healthcare professional. [place] [address] any warranty or liability for your use of this information. Foot Pain: Care Instructions  Your Care Instructions  Foot injuries that cause pain and swelling are fairly common. Almost all sports or home repair projects can cause a misstep that ends up as foot pain.  Normal wear and tear, especially as you get older, also can cause foot pain. Most minor foot injuries will heal on their own, and home treatment is usually all you need to do. If you have a severe injury, you may need tests and treatment. Follow-up care is a key part of your treatment and safety. Be sure to make and go to all appointments, and call your doctor if you are having problems. It's also a good idea to know your test results and keep a list of the medicines you take. How can you care for yourself at home? · Take pain medicines exactly as directed. ? If the doctor gave you a prescription medicine for pain, take it as prescribed. ? If you are not taking a prescription pain medicine, ask your doctor if you can take an over-the-counter medicine. · Rest and protect your foot. Take a break from any activity that may cause pain. · Put ice or a cold pack on your foot for 10 to 20 minutes at a time. Put a thin cloth between the ice and your skin. · Prop up the sore foot on a pillow when you ice it or anytime you sit or lie down during the next 3 days. Try to keep it above the level of your heart. This will help reduce swelling. · Your doctor may recommend that you wrap your foot with an elastic bandage. Keep your foot wrapped for as long as your doctor advises. · If your doctor recommends crutches, use them as directed. · Wear roomy footwear. · As soon as pain and swelling end, begin gentle exercises of your foot. Your doctor can tell you which exercises will help. When should you call for help? Call 911 anytime you think you may need emergency care. For example, call if:    · Your foot turns pale, white, blue, or cold.    Call your doctor now or seek immediate medical care if:    · You cannot move or stand on your foot.     · Your foot looks twisted or out of its normal position.     · Your foot is not stable when you step down.     · You have signs of infection, such as:  ? Increased pain, swelling, warmth, or redness.   ? Red streaks leading from the sore area. ? Pus draining from a place on your foot. ? A fever.     · Your foot is numb or tingly.    Watch closely for changes in your health, and be sure to contact your doctor if:    · You do not get better as expected.     · You have bruises from an injury that last longer than 2 weeks. Where can you learn more? Go to http://juan-jojo.info/. Enter E772 in the search box to learn more about \"Foot Pain: Care Instructions. \"  Current as of: November 29, 2017  Content Version: 11.8  © 8780-1976 [employer]. Care instructions adapted under license by [employer] (which disclaims liability or warranty for this information). If you have questions about a medical condition or this instruction, always ask your healthcare professional. [address] any warranty or liability for your use of this information.

## 2019-01-02 NOTE — ED NOTES
I have reviewed discharge instructions with the patient. The patient verbalized understanding. Discharge medications reviewed with patient and appropriate educational materials and side effects teaching were provided. Patient armband removed and shredded
normal...

## 2019-01-02 NOTE — ED PROVIDER NOTES
EMERGENCY DEPARTMENT HISTORY AND PHYSICAL EXAM 
 
Date: 1/2/2019 Patient Name: Yfn Olivas History of Presenting Illness Chief Complaint Patient presents with  Foot Pain History Provided By: Patient Chief Complaint: right foot pain and swelling Duration: 4 Days Timing:  Gradual and Worsening Location: right foot Severity: 6 out of 10 Modifying Factors: walking on foot worsens pain Associated Symptoms: gait difficulty (pt unable to walk on it), warmth from right foot, and redness. Additional History (Context):  
10:20 AM 
Yfn Olivas is a 70 y.o. male with PMHX of HTN, HLD, skin cancer, arthritis, left knee DJD, and CAD who presents to the emergency department C/O right foot pain and swelling onset 4 days ago. Associated sxs include gait difficulty (pt unable to walk on it), warmth from right foot, and redness. Pt using cane from previous check up PTA, where he saw pt PCP. Pt states that he has been using compression stockings for the past 3 days to help with the swelling. Allergy reported to [facility]. PSHx of cardiac stent placement, and orthopedic surgeries including left partial knee replacement. Pt denies previous hx of gout, right foot injury, hx of DM, and any other sxs or complaints. PCP: Jose Anne MD 
 
Current Outpatient Medications Medication Sig Dispense Refill  colchicine 0.6 mg tablet Take 1 Tab by mouth two (2) times a day for 3 days. 6 Tab 0  predniSONE (STERAPRED DS) 10 mg dose pack Take with food. 21 Tab 0  
 HYDROcodone-acetaminophen (NORCO) 5-325 mg per tablet Take 1 Tab by mouth every four (4) hours as needed for Pain. Max Daily Amount: 6 Tabs. 12 Tab 0  cephALEXin (KEFLEX) 500 mg capsule Take 1 Cap by mouth four (4) times daily for 10 days. 40 Cap 0  
 aspirin (ASPIRIN) 325 mg tablet Take 325 mg by mouth daily.  fish oil-omega-3 fatty acids 340-1,000 mg capsule Take 1 Cap by mouth daily.  rosuvastatin (CRESTOR) 10 mg tablet Take 10 mg by mouth nightly.  multivitamin (ONE A DAY) tablet Take 1 Tab by mouth daily.  valsartan-hydrochlorothiazide (DIOVAN HCT) 160-25 mg per tablet Take 1 Tab by mouth daily. Past History Past Medical History: 
Past Medical History:  
Diagnosis Date  Arthritis  CAD (coronary artery disease) stent  Cancer ([address] skin  Essential hypertension  H/O seasonal allergies  Hyperlipidemia  Hypertension 2004  Left knee DJD 2014 Past Surgical History: 
Past Surgical History:  
Procedure Laterality Date  CARDIAC SURG PROCEDURE UNLIST    
 stent placement  HX APPENDECTOMY  [address] SURGERY  2011  HX CORONARY STENT PLACEMENT  2009  HX ORTHOPAEDIC    
 right elbow  HX ORTHOPAEDIC    
 carpal tunnel  HX ORTHOPAEDIC    
 left partial knee  HX SHOULDER REPLACEMENT  2009  HX TONSILLECTOMY Family History: 
History reviewed. No pertinent family history. Social History: 
Social History Tobacco Use  Smoking status: Former Smoker Last attempt to quit: 1969 Years since quittin.8  Smokeless tobacco: Never Used Substance Use Topics  Alcohol use: Yes Alcohol/week: 0.5 oz Types: 1 Cans of beer per week Comment: 5 x year  Drug use: No  
 
 
Allergies: Allergies Allergen Reactions  Percocet [Oxycodone-Acetaminophen] Nausea and Vomiting Review of Systems Review of Systems Constitutional: Negative for chills and fever. Respiratory: Negative for shortness of breath. Cardiovascular: Negative for leg swelling. Musculoskeletal: Positive for arthralgias and gait problem. (+) Right foot pain 
(+) Right foot swelling Skin: Positive for color change (redness to right foot). (+) Warmth to right foot Neurological: Negative for weakness and numbness. All other systems reviewed and are negative.  
 
 
Physical Exam  
 
 Vitals:  
 01/02/19 1003 BP: 143/74 Pulse: 79 Resp: 18 Temp: 98 °F (36.7 °C) SpO2: 95% Weight: 99.8 kg (220 lb) Height: 5' 10.5\" (1.791 m) Physical Exam  
Constitutional: He is oriented to person, place, and time. He appears well-developed and well-nourished. No distress.  geriatric male in NAD. Alert. In fast track room. Appears comfortable. HENT:  
Head: Normocephalic and atraumatic. Right Ear: External ear normal.  
Left Ear: External ear normal.  
Nose: Nose normal.  
Eyes: Conjunctivae are normal.  
Neck: Normal range of motion. Cardiovascular: Normal rate, regular rhythm, normal heart sounds and intact distal pulses. Exam reveals no gallop and no friction rub. No murmur heard. Pulses: 
     Dorsalis pedis pulses are 2+ on the right side, and 2+ on the left side. Posterior tibial pulses are 2+ on the right side, and 2+ on the left side. Pulmonary/Chest: Effort normal and breath sounds normal. No accessory muscle usage. No tachypnea. He has no decreased breath sounds. He has no rhonchi. Abdominal: Soft. Musculoskeletal:  
     Right ankle: He exhibits normal range of motion, no swelling and no ecchymosis. No tenderness. Left ankle: He exhibits normal range of motion, no swelling and no ecchymosis. No tenderness. Right upper leg: He exhibits no tenderness, no bony tenderness and no swelling. Left upper leg: He exhibits no tenderness and no bony tenderness. Right lower leg: He exhibits no tenderness, no bony tenderness, no swelling and no edema. Left lower leg: He exhibits no tenderness, no bony tenderness, no swelling and no edema. Right foot: There is tenderness and swelling. There is normal range of motion. Left foot: There is normal range of motion, no tenderness, no bony tenderness and no swelling. Feet: 
 
Neurological: He is alert and oriented to person, place, and time. Skin: Skin is warm and dry. He is not diaphoretic. Psychiatric: He has a normal mood and affect. Judgment normal.  
Nursing note and vitals reviewed. Diagnostic Study Results Labs - No results found for this or any previous visit (from the past 12 hour(s)). Radiologic Studies -  
XR FOOT RT MIN 3 V    (Results Pending) RADIOLOGY FINDINGS Right foot X-ray shows degenerative changes Pending review by Radiologist 
Recorded by Aj Beard ED Scribe, as dictated by Elle Mcgowan PA-C  
 
CT Results  (Last 48 hours) None CXR Results  (Last 48 hours) None Duplex lower extremities venous right result: No evidence of acute deep vein thrombosis in the right common femoral, superficial femoral, popliteal, posterior tibial, and peroneal veins. The veins were imaged in the transverse and longitudinal planes. The vessels showed normal color filling and compressibility. Doppler interrogation showed phasic and spontaneous flow. For comparison purposes, the left common femoral vein was briefly interrogated. These vein demonstrates normal color filing and compressibility. Doppler flow was phasic and spontaneous. Medications given in the ED- Medications - No data to display Medical Decision Making I am the first provider for this patient. I reviewed the vital signs, available nursing notes, past medical history, past surgical history, family history and social history. Vital Signs-Reviewed the patient's vital signs. Pulse Oximetry Analysis - 95% on room air. Records Reviewed: Nursing Notes and Old Medical Records Provider Notes (Medical Decision Making): sprain, strain, tendonitis, gout, cellulitis, DVT. No evidence of septic joint, ischemic limb, or compartment syndrome. Procedures: 
Procedures ED Course:  
10:20 AM Initial assessment performed.  The patients presenting problems have been discussed, and they are in agreement with the care plan formulated and outlined with them. I have encouraged them to ask questions as they arise throughout their visit. Diagnosis and Disposition Imaging unremarkable. NVI. Will tx for gout v early cellulitis. US neg for DVT. Podiatry FU. Reasons to RTED discussed with pt. All questions answered. Pt feels comfortable going home at this time. Pt expressed understanding and he agrees with plan. DISCHARGE NOTE: 
12:18 PM 
Daisha Rodriguez's  results have been reviewed with him. He has been counseled regarding his diagnosis, treatment, and plan. He verbally conveys understanding and agreement of the signs, symptoms, diagnosis, treatment and prognosis and additionally agrees to follow up as discussed. He also agrees with the care-plan and conveys that all of his questions have been answered. I have also provided discharge instructions for him that include: educational information regarding their diagnosis and treatment, and list of reasons why they would want to return to the ED prior to their follow-up appointment, should his condition change. He has been provided with education for proper emergency department utilization. CLINICAL IMPRESSION: 
 
1. Arthralgia of right foot 2. Cellulitis of right foot PLAN: 
1. D/C Home 2. Discharge Medication List as of 1/2/2019 12:18 PM  
  
START taking these medications Details  
colchicine 0.6 mg tablet Take 1 Tab by mouth two (2) times a day for 3 days. , Normal, Disp-6 Tab, R-0  
  
predniSONE (STERAPRED DS) 10 mg dose pack Take with food., Normal, Disp-21 Tab, R-0  
  
HYDROcodone-acetaminophen (NORCO) 5-325 mg per tablet Take 1 Tab by mouth every four (4) hours as needed for Pain. Max Daily Amount: 6 Tabs., Print, Disp-12 Tab, R-0  
  
cephALEXin (KEFLEX) 500 mg capsule Take 1 Cap by mouth four (4) times daily for 10 days. , Normal, Disp-40 Cap, R-0  
  
  
CONTINUE these medications which have NOT CHANGED Details aspirin (ASPIRIN) 325 mg tablet Take 325 mg by mouth daily. , Historical Med  
  
fish oil-omega-3 fatty acids 340-1,000 mg capsule Take 1 Cap by mouth daily. , Historical Med  
  
rosuvastatin (CRESTOR) 10 mg tablet Take 10 mg by mouth nightly. Historical Med, 10 mg  
  
multivitamin (ONE A DAY) tablet Take 1 Tab by mouth daily. Historical Med, 1 Tab  
  
valsartan-hydrochlorothiazide (DIOVAN HCT) 160-25 mg per tablet Take 1 Tab by mouth daily. Historical Med, 1 Tab 3. Follow-up Information Follow up With Specialties Details Why Contact Info Jorgito Dasilva MD Internal Medicine Schedule an appointment as soon as possible for a visit For Primary Care Follow Up [address] [address] 
[phone] Cheryl Lazo DPM Podiatry Schedule an appointment as soon as possible for a visit For Podiatry Follow Up [address] [facility] [address] [facility] 
[phone] [facility] EMERGENCY DEPT Emergency Medicine Go to If symptoms worsen, As needed [address] [facility] [address] 
[phone]  
  
 
_______________________________ Attestations: This note is prepared by Viri Mullins, acting as Scribe for Cecile Lau PA-C. Cecile Lau PA-C:  The scribe's documentation has been prepared under my direction and personally reviewed by me in its entirety. I confirm that the note above accurately reflects all work, treatment, procedures, and medical decision making performed by me.

## 2019-12-31 ENCOUNTER — HOSPITAL ENCOUNTER (OUTPATIENT)
Dept: CT IMAGING | Age: 79
Discharge: HOME | End: 2019-12-31
Attending: PHYSICIAN ASSISTANT
Payer: COMMERCIAL

## 2019-12-31 DIAGNOSIS — L08.9 INFECTION OF SKIN AND SUBCUTANEOUS TISSUE: ICD-10-CM

## 2019-12-31 DIAGNOSIS — M79.672 LEFT FOOT PAIN: ICD-10-CM

## 2019-12-31 PROCEDURE — 73700 CT LOWER EXTREMITY W/O DYE: CPT

## 2021-01-07 ENCOUNTER — HOSPITAL ENCOUNTER (INPATIENT)
Age: 81
LOS: 7 days | Discharge: NURSING, REHAB OR LONG-TERM CARE | DRG: 565 | End: 2021-01-14
Attending: EMERGENCY MEDICINE | Admitting: HOSPITALIST
Payer: MEDICARE

## 2021-01-07 ENCOUNTER — APPOINTMENT (OUTPATIENT)
Dept: CT IMAGING | Age: 81
DRG: 565 | End: 2021-01-07
Attending: EMERGENCY MEDICINE
Payer: MEDICARE

## 2021-01-07 ENCOUNTER — APPOINTMENT (OUTPATIENT)
Dept: GENERAL RADIOLOGY | Age: 81
DRG: 565 | End: 2021-01-07
Attending: EMERGENCY MEDICINE
Payer: MEDICARE

## 2021-01-07 DIAGNOSIS — N39.0 SEPSIS DUE TO URINARY TRACT INFECTION (HCC): ICD-10-CM

## 2021-01-07 DIAGNOSIS — N30.00 ACUTE CYSTITIS WITHOUT HEMATURIA: Primary | ICD-10-CM

## 2021-01-07 DIAGNOSIS — M54.42 ACUTE MIDLINE LOW BACK PAIN WITH BILATERAL SCIATICA: ICD-10-CM

## 2021-01-07 DIAGNOSIS — M54.41 ACUTE MIDLINE LOW BACK PAIN WITH BILATERAL SCIATICA: ICD-10-CM

## 2021-01-07 DIAGNOSIS — R29.898 WEAKNESS OF BOTH LOWER EXTREMITIES: ICD-10-CM

## 2021-01-07 DIAGNOSIS — A41.9 SEPSIS DUE TO URINARY TRACT INFECTION (HCC): ICD-10-CM

## 2021-01-07 PROBLEM — Z95.0 STATUS CARDIAC PACEMAKER: Status: ACTIVE | Noted: 2021-01-07
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
## 2021-01-07 PROBLEM — R79.89 ELEVATED SERUM CREATININE: Status: ACTIVE | Noted: 2021-01-07

## 2021-01-07 LAB
ALBUMIN SERPL-MCNC: 3.4 G/DL (ref 3.4–5)
ALBUMIN/GLOB SERPL: 0.7 {RATIO} (ref 0.8–1.7)
ALP SERPL-CCNC: 95 U/L (ref 45–117)
ALT SERPL-CCNC: 29 U/L (ref 16–61)
ANION GAP SERPL CALC-SCNC: 13 MMOL/L (ref 3–18)
APPEARANCE UR: ABNORMAL
APTT PPP: 44.6 SEC (ref 23–36.4)
AST SERPL-CCNC: 55 U/L (ref 10–38)
ATRIAL RATE: 114 BPM
BACTERIA URNS QL MICRO: ABNORMAL /HPF
BASOPHILS # BLD: 0 K/UL (ref 0–0.1)
BASOPHILS NFR BLD: 0 % (ref 0–2)
BILIRUB SERPL-MCNC: 1 MG/DL (ref 0.2–1)
BILIRUB UR QL: NEGATIVE
BNP SERPL-MCNC: 3258 PG/ML (ref 0–1800)
BUN SERPL-MCNC: 35 MG/DL (ref 7–18)
BUN/CREAT SERPL: 21 (ref 12–20)
CALCIUM SERPL-MCNC: 9.6 MG/DL (ref 8.5–10.1)
CALCULATED P AXIS, ECG09: 55 DEGREES
CALCULATED R AXIS, ECG10: -84 DEGREES
CALCULATED T AXIS, ECG11: 81 DEGREES
CHLORIDE SERPL-SCNC: 100 MMOL/L (ref 100–111)
CK MB CFR SERPL CALC: 0.2 % (ref 0–4)
CK MB SERPL-MCNC: 5.1 NG/ML (ref 5–25)
CK SERPL-CCNC: 2090 U/L (ref 39–308)
CO2 SERPL-SCNC: 25 MMOL/L (ref 21–32)
COLOR UR: YELLOW
CREAT SERPL-MCNC: 1.69 MG/DL (ref 0.6–1.3)
DIAGNOSIS, 93000: NORMAL
DIFFERENTIAL METHOD BLD: ABNORMAL
EOSINOPHIL # BLD: 0 K/UL (ref 0–0.4)
EOSINOPHIL NFR BLD: 0 % (ref 0–5)
EPITH CASTS URNS QL MICRO: ABNORMAL /LPF (ref 0–5)
ERYTHROCYTE [DISTWIDTH] IN BLOOD BY AUTOMATED COUNT: 16.1 % (ref 11.6–14.5)
GLOBULIN SER CALC-MCNC: 5.2 G/DL (ref 2–4)
GLUCOSE SERPL-MCNC: 149 MG/DL (ref 74–99)
GLUCOSE UR STRIP.AUTO-MCNC: NEGATIVE MG/DL
HCT VFR BLD AUTO: 41.9 % (ref 36–48)
HGB BLD-MCNC: 13.8 G/DL (ref 13–16)
HGB UR QL STRIP: ABNORMAL
INR PPP: 1.3 (ref 0.8–1.2)
KETONES UR QL STRIP.AUTO: NEGATIVE MG/DL
LACTATE BLD-SCNC: 2.98 MMOL/L (ref 0.4–2)
LEUKOCYTE ESTERASE UR QL STRIP.AUTO: ABNORMAL
LYMPHOCYTES # BLD: 1.1 K/UL (ref 0.9–3.6)
LYMPHOCYTES NFR BLD: 5 % (ref 21–52)
MCH RBC QN AUTO: 30.2 PG (ref 24–34)
MCHC RBC AUTO-ENTMCNC: 32.9 G/DL (ref 31–37)
MCV RBC AUTO: 91.7 FL (ref 74–97)
MONOCYTES # BLD: 1.5 K/UL (ref 0.05–1.2)
MONOCYTES NFR BLD: 7 % (ref 3–10)
NEUTS SEG # BLD: 19.6 K/UL (ref 1.8–8)
NEUTS SEG NFR BLD: 88 % (ref 40–73)
NITRITE UR QL STRIP.AUTO: POSITIVE
P-R INTERVAL, ECG05: 124 MS
PH UR STRIP: 5.5 [PH] (ref 5–8)
PLATELET # BLD AUTO: 296 K/UL (ref 135–420)
PMV BLD AUTO: 9.2 FL (ref 9.2–11.8)
POTASSIUM SERPL-SCNC: 4.3 MMOL/L (ref 3.5–5.5)
PROT SERPL-MCNC: 8.6 G/DL (ref 6.4–8.2)
PROT UR STRIP-MCNC: 100 MG/DL
PROTHROMBIN TIME: 16 SEC (ref 11.5–15.2)
Q-T INTERVAL, ECG07: 388 MS
QRS DURATION, ECG06: 142 MS
QTC CALCULATION (BEZET), ECG08: 534 MS
RBC # BLD AUTO: 4.57 M/UL (ref 4.7–5.5)
RBC #/AREA URNS HPF: ABNORMAL /HPF (ref 0–5)
SODIUM SERPL-SCNC: 138 MMOL/L (ref 136–145)
SP GR UR REFRACTOMETRY: 1.01 (ref 1–1.03)
TROPONIN I SERPL-MCNC: 0.03 NG/ML (ref 0–0.04)
UROBILINOGEN UR QL STRIP.AUTO: 0.2 EU/DL (ref 0.2–1)
VENTRICULAR RATE, ECG03: 114 BPM
WBC # BLD AUTO: 22.1 K/UL (ref 4.6–13.2)
WBC URNS QL MICRO: ABNORMAL /HPF (ref 0–5)

## 2021-01-07 PROCEDURE — 62304 MYELOGRAPHY LUMBAR INJECTION: CPT

## 2021-01-07 PROCEDURE — 87040 BLOOD CULTURE FOR BACTERIA: CPT

## 2021-01-07 PROCEDURE — 74011000250 HC RX REV CODE- 250: Performed by: EMERGENCY MEDICINE

## 2021-01-07 PROCEDURE — 96375 TX/PRO/DX INJ NEW DRUG ADDON: CPT

## 2021-01-07 PROCEDURE — 65270000029 HC RM PRIVATE

## 2021-01-07 PROCEDURE — 87186 SC STD MICRODIL/AGAR DIL: CPT

## 2021-01-07 PROCEDURE — 72131 CT LUMBAR SPINE W/O DYE: CPT

## 2021-01-07 PROCEDURE — 85610 PROTHROMBIN TIME: CPT

## 2021-01-07 PROCEDURE — 85025 COMPLETE CBC W/AUTO DIFF WBC: CPT

## 2021-01-07 PROCEDURE — 80053 COMPREHEN METABOLIC PANEL: CPT

## 2021-01-07 PROCEDURE — 74011000636 HC RX REV CODE- 636: Performed by: HOSPITALIST

## 2021-01-07 PROCEDURE — 99285 EMERGENCY DEPT VISIT HI MDM: CPT

## 2021-01-07 PROCEDURE — 93005 ELECTROCARDIOGRAM TRACING: CPT

## 2021-01-07 PROCEDURE — 74011250636 HC RX REV CODE- 250/636: Performed by: EMERGENCY MEDICINE

## 2021-01-07 PROCEDURE — 51798 US URINE CAPACITY MEASURE: CPT

## 2021-01-07 PROCEDURE — 74011250636 HC RX REV CODE- 250/636: Performed by: HOSPITALIST

## 2021-01-07 PROCEDURE — 72132 CT LUMBAR SPINE W/DYE: CPT

## 2021-01-07 PROCEDURE — B01B1ZZ FLUOROSCOPY OF SPINAL CORD USING LOW OSMOLAR CONTRAST: ICD-10-PCS | Performed by: RADIOLOGY

## 2021-01-07 PROCEDURE — 83880 ASSAY OF NATRIURETIC PEPTIDE: CPT

## 2021-01-07 PROCEDURE — 87077 CULTURE AEROBIC IDENTIFY: CPT

## 2021-01-07 PROCEDURE — 87086 URINE CULTURE/COLONY COUNT: CPT

## 2021-01-07 PROCEDURE — 71045 X-RAY EXAM CHEST 1 VIEW: CPT

## 2021-01-07 PROCEDURE — 94762 N-INVAS EAR/PLS OXIMTRY CONT: CPT

## 2021-01-07 PROCEDURE — 81001 URINALYSIS AUTO W/SCOPE: CPT

## 2021-01-07 PROCEDURE — 83605 ASSAY OF LACTIC ACID: CPT

## 2021-01-07 PROCEDURE — 82553 CREATINE MB FRACTION: CPT

## 2021-01-07 PROCEDURE — 51701 INSERT BLADDER CATHETER: CPT

## 2021-01-07 PROCEDURE — 96374 THER/PROPH/DIAG INJ IV PUSH: CPT

## 2021-01-07 PROCEDURE — 85730 THROMBOPLASTIN TIME PARTIAL: CPT

## 2021-01-07 RX ORDER — SODIUM CHLORIDE 9 MG/ML
25 INJECTION, SOLUTION INTRAVENOUS CONTINUOUS
Status: DISCONTINUED | OUTPATIENT
Start: 2021-01-07 | End: 2021-01-14 | Stop reason: HOSPADM

## 2021-01-07 RX ORDER — MORPHINE SULFATE 2 MG/ML
1-2 INJECTION, SOLUTION INTRAMUSCULAR; INTRAVENOUS
Status: DISCONTINUED | OUTPATIENT
Start: 2021-01-07 | End: 2021-01-12

## 2021-01-07 RX ORDER — VALSARTAN 40 MG/1
40 TABLET ORAL 2 TIMES DAILY
Status: ON HOLD | COMMUNITY
End: 2021-01-13 | Stop reason: CLARIF

## 2021-01-07 RX ORDER — SPIRONOLACTONE 25 MG/1
25 TABLET ORAL DAILY
COMMUNITY

## 2021-01-07 RX ORDER — ROPINIROLE 3 MG/1
3 TABLET, FILM COATED ORAL
COMMUNITY

## 2021-01-07 RX ORDER — ACETAMINOPHEN 325 MG/1
650 TABLET ORAL
Status: DISCONTINUED | OUTPATIENT
Start: 2021-01-07 | End: 2021-01-14 | Stop reason: HOSPADM

## 2021-01-07 RX ORDER — FACIAL-BODY WIPES
10 EACH TOPICAL DAILY PRN
Status: DISCONTINUED | OUTPATIENT
Start: 2021-01-07 | End: 2021-01-14 | Stop reason: HOSPADM

## 2021-01-07 RX ORDER — FUROSEMIDE 40 MG/1
80 TABLET ORAL DAILY
COMMUNITY
End: 2021-01-14

## 2021-01-07 RX ORDER — HEPARIN SODIUM 5000 [USP'U]/ML
5000 INJECTION, SOLUTION INTRAVENOUS; SUBCUTANEOUS EVERY 8 HOURS
Status: DISCONTINUED | OUTPATIENT
Start: 2021-01-08 | End: 2021-01-14 | Stop reason: HOSPADM

## 2021-01-07 RX ORDER — SODIUM CHLORIDE 0.9 % (FLUSH) 0.9 %
5-40 SYRINGE (ML) INJECTION AS NEEDED
Status: DISCONTINUED | OUTPATIENT
Start: 2021-01-07 | End: 2021-01-14 | Stop reason: HOSPADM

## 2021-01-07 RX ORDER — DOCUSATE SODIUM 100 MG/1
100 CAPSULE, LIQUID FILLED ORAL DAILY
Status: DISCONTINUED | OUTPATIENT
Start: 2021-01-08 | End: 2021-01-14 | Stop reason: HOSPADM

## 2021-01-07 RX ORDER — ONDANSETRON 2 MG/ML
4 INJECTION INTRAMUSCULAR; INTRAVENOUS
Status: COMPLETED | OUTPATIENT
Start: 2021-01-07 | End: 2021-01-07

## 2021-01-07 RX ORDER — MORPHINE SULFATE 4 MG/ML
4 INJECTION INTRAVENOUS
Status: ACTIVE | OUTPATIENT
Start: 2021-01-07 | End: 2021-01-08

## 2021-01-07 RX ORDER — MORPHINE SULFATE 2 MG/ML
2 INJECTION, SOLUTION INTRAMUSCULAR; INTRAVENOUS
Status: COMPLETED | OUTPATIENT
Start: 2021-01-07 | End: 2021-01-07

## 2021-01-07 RX ORDER — ACETAMINOPHEN 650 MG/1
650 SUPPOSITORY RECTAL
Status: DISCONTINUED | OUTPATIENT
Start: 2021-01-07 | End: 2021-01-14 | Stop reason: HOSPADM

## 2021-01-07 RX ORDER — LIDOCAINE HYDROCHLORIDE 10 MG/ML
1-10 INJECTION, SOLUTION EPIDURAL; INFILTRATION; INTRACAUDAL; PERINEURAL
Status: COMPLETED | OUTPATIENT
Start: 2021-01-07 | End: 2021-01-07

## 2021-01-07 RX ORDER — ONDANSETRON 2 MG/ML
4 INJECTION INTRAMUSCULAR; INTRAVENOUS
Status: DISCONTINUED | OUTPATIENT
Start: 2021-01-07 | End: 2021-01-14 | Stop reason: HOSPADM

## 2021-01-07 RX ORDER — POTASSIUM CHLORIDE 20 MEQ/1
20 TABLET, EXTENDED RELEASE ORAL DAILY
COMMUNITY
End: 2021-01-14

## 2021-01-07 RX ORDER — PROMETHAZINE HYDROCHLORIDE 25 MG/1
12.5 TABLET ORAL
Status: DISCONTINUED | OUTPATIENT
Start: 2021-01-07 | End: 2021-01-14 | Stop reason: HOSPADM

## 2021-01-07 RX ORDER — LIDOCAINE HYDROCHLORIDE 10 MG/ML
1-10 INJECTION, SOLUTION EPIDURAL; INFILTRATION; INTRACAUDAL; PERINEURAL
Status: DISCONTINUED | OUTPATIENT
Start: 2021-01-07 | End: 2021-01-07 | Stop reason: CLARIF

## 2021-01-07 RX ORDER — SODIUM CHLORIDE 0.9 % (FLUSH) 0.9 %
5-40 SYRINGE (ML) INJECTION EVERY 8 HOURS
Status: DISCONTINUED | OUTPATIENT
Start: 2021-01-07 | End: 2021-01-14 | Stop reason: HOSPADM

## 2021-01-07 RX ORDER — OMEPRAZOLE 20 MG/1
20 CAPSULE, DELAYED RELEASE ORAL DAILY
Status: ON HOLD | COMMUNITY
End: 2021-01-13

## 2021-01-07 RX ORDER — LANOLIN ALCOHOL/MO/W.PET/CERES
400 CREAM (GRAM) TOPICAL DAILY
COMMUNITY
End: 2021-01-14

## 2021-01-07 RX ADMIN — SODIUM CHLORIDE 50 ML/HR: 900 INJECTION, SOLUTION INTRAVENOUS at 21:42

## 2021-01-07 RX ADMIN — IOPAMIDOL 17 ML: 408 INJECTION, SOLUTION INTRATHECAL at 21:34

## 2021-01-07 RX ADMIN — MORPHINE SULFATE 2 MG: 2 INJECTION, SOLUTION INTRAMUSCULAR; INTRAVENOUS at 15:41

## 2021-01-07 RX ADMIN — ONDANSETRON 4 MG: 2 INJECTION INTRAMUSCULAR; INTRAVENOUS at 15:41

## 2021-01-07 RX ADMIN — CEFTRIAXONE 1 G: 1 INJECTION, POWDER, FOR SOLUTION INTRAMUSCULAR; INTRAVENOUS at 15:38

## 2021-01-07 RX ADMIN — LIDOCAINE HYDROCHLORIDE 5 ML: 10 INJECTION, SOLUTION EPIDURAL; INFILTRATION; INTRACAUDAL; PERINEURAL at 21:34

## 2021-01-07 RX ADMIN — MORPHINE SULFATE 2 MG: 2 INJECTION, SOLUTION INTRAMUSCULAR; INTRAVENOUS at 21:42

## 2021-01-07 RX ADMIN — SODIUM CHLORIDE 1000 ML: 900 INJECTION, SOLUTION INTRAVENOUS at 16:48

## 2021-01-07 NOTE — Clinical Note
Status[de-identified] INPATIENT [101]   Type of Bed: Medical [8]   Inpatient Hospitalization Certified Necessary for the Following Reasons: 3.  Patient receiving treatment that can only be provided in an inpatient setting (further clarification in H&P documentation)   Admitting Diagnosis: UTI (urinary tract infection) [803514]   Admitting Physician: Yusra Linda [[id number]]   Attending Physician: Yusra Linda [[id number]]   Estimated Length of Stay: 3-4 Midnights   Discharge Plan[de-identified] [address] [facility]

## 2021-01-07 NOTE — ED TRIAGE NOTES
Pt arrives ambulatory to ED with c\o increased weakness in BLE, pt sts he normally ambulates with a walker but is having difficulty x 3 days

## 2021-01-07 NOTE — ED PROVIDER NOTES
EMERGENCY DEPARTMENT HISTORY AND PHYSICAL EXAM    Date: 1/7/2021  Patient Name: Garcia Fritz    History of Presenting Illness     Chief Complaint   Patient presents with    Extremity Weakness         History Provided By: Patient, Patient's Wife and EMS    3:16 PM  Garcia Fritz is a 68 y.o. male with PMHX of pacemaker placement, hypertension, high cholesterol who presents to the emergency department C/O low back pain and bilateral lower extremity weakness. Per patient his wife he had a fall down 14 stairs 2 weeks ago. He did not seek medical attention at that time and about 2 days ago he started having weakness in his legs and severe pain in his low back. He is no longer able to ambulate with his walker due to the weakness. He notes he has had some difficulty emptying his bladder as well. He denies any fever, chest pain, shortness of breath, cough, vomiting, sick contacts, other complaints. PCP: Brando Morelos MD    Current Facility-Administered Medications   Medication Dose Route Frequency Provider Last Rate Last Admin    sodium chloride 0.9 % bolus infusion 1,000 mL  1,000 mL IntraVENous ONCE Dewayne Holbrook MD        Followed by   [place] sodium chloride 0.9 % bolus infusion 190 mL  190 mL IntraVENous ONCE Dewayne Holbrook MD        morphine injection 4 mg  4 mg IntraVENous NOW Dewayne Holbrook MD        iopamidoL (ISOVUE-M 200) 41 % contrast solution 1-20 mL  1-20 mL IntraSPINal YESSY Bradford MD        lidocaine (PF) (XYLOCAINE) 10 mg/mL (1 %) injection 1-10 mL  1-10 mL SubCUTAneous YESSY Bradford MD         Current Outpatient Medications   Medication Sig Dispense Refill    predniSONE (STERAPRED DS) 10 mg dose pack Take with food. 21 Tab 0    HYDROcodone-acetaminophen (NORCO) 5-325 mg per tablet Take 1 Tab by mouth every four (4) hours as needed for Pain. Max Daily Amount: 6 Tabs. 12 Tab 0    aspirin (ASPIRIN) 325 mg tablet Take 325 mg by mouth daily.       fish oil-omega-3 fatty acids 340-1,000 mg capsule Take 1 Cap by mouth daily.  rosuvastatin (CRESTOR) 10 mg tablet Take 10 mg by mouth nightly.  multivitamin (ONE A DAY) tablet Take 1 Tab by mouth daily.  valsartan-hydrochlorothiazide (DIOVAN HCT) 160-25 mg per tablet Take 1 Tab by mouth daily. Past History     Past Medical History:  Past Medical History:   Diagnosis Date    Arrhythmia     Arthritis     CAD (coronary artery disease)     stent    Cancer ([address]     skin    Essential hypertension     H/O seasonal allergies     Hyperlipidemia     Hypertension 2004    Left knee DJD 2014       Past Surgical History:  Past Surgical History:   Procedure Laterality Date    HX APPENDECTOMY      HX BACK SURGERY      HX CORONARY STENT PLACEMENT  2009    HX ORTHOPAEDIC      right elbow    HX ORTHOPAEDIC      carpal tunnel     HX ORTHOPAEDIC      left partial knee    HX PACEMAKER      defibrillator and pacemaker on left. [address] HX SHOULDER REPLACEMENT      HX TONSILLECTOMY      [place] CARDIAC SURG PROCEDURE UNLIST  2009    stent placement       Family History:  History reviewed. No pertinent family history. Social History:  Social History     Tobacco Use    Smoking status: Former Smoker     Quit date: 1969     Years since quittin.9    Smokeless tobacco: Never Used   Substance Use Topics    Alcohol use: Not Currently    Drug use: No       Allergies: Allergies   Allergen Reactions    Percocet [Oxycodone-Acetaminophen] Nausea and Vomiting         Review of Systems   Review of Systems   Constitutional: Negative for fever. Respiratory: Negative for shortness of breath. Cardiovascular: Negative for chest pain. Gastrointestinal: Negative for abdominal pain. Musculoskeletal: Positive for back pain. Neurological: Positive for weakness. All other systems reviewed and are negative.         Physical Exam     Vitals:    21 1700 21 1730 21 1900 21 1917   BP: (!) 91/58 104/78 (!) 110/55 (!) 110/55   Pulse: (!) 102 (!) 104  (!) 102   Resp: 20 24  16   Temp:    98.2 °F (36.8 °C)   SpO2: 98%   95%   Weight:       Height:         Physical Exam    Nursing notes and vital signs reviewed    Constitutional: Non toxic appearing, moderate distress  Head: Normocephalic, Atraumatic  Eyes: EOMI  Neck: Supple  Cardiovascular: Tachycardic and regular rhythm, no murmurs, rubs, or gallops  Chest: Normal work of breathing and chest excursion bilaterally  Lungs: Clear to ausculation bilaterally  Abdomen: Soft, non tender, non distended  Back: Exquisitely tender over lumbar spine  Extremities: No evidence of trauma or deformity, mild bilateral LE edema  Skin: Warm and dry, normal cap refill  Neuro: Alert and appropriate, CN intact, normal speech, strength and sensation symmetric bilaterally, no saddle anesthesia, unable to assess rectal tone as patient is unable to reposition himself due to pain at this time, weak in both lower extremities with a 2 out of 5, 5 out of 5 in upper extremities, normal coordination  Psychiatric: Normal mood and affect      Diagnostic Study Results     Labs -     Recent Results (from the past 12 hour(s))   EKG, 12 LEAD, INITIAL    Collection Time: 01/07/21  3:00 PM   Result Value Ref Range    Ventricular Rate 114 BPM    Atrial Rate 114 BPM    P-R Interval 124 ms    QRS Duration 142 ms    Q-T Interval 388 ms    QTC Calculation (Bezet) 534 ms    Calculated P Axis 55 degrees    Calculated R Axis -84 degrees    Calculated T Axis 81 degrees    Diagnosis       Poor data quality, interpretation may be adversely affected  Electronic ventricular pacemaker  When compared with ECG of 10-JUL-2017 14:38,  Electronic ventricular pacemaker has replaced Sinus rhythm  Vent.  rate has increased BY  47 BPM     CBC WITH AUTOMATED DIFF    Collection Time: 01/07/21  3:09 PM   Result Value Ref Range    WBC 22.1 (H) 4.6 - 13.2 K/uL    RBC 4.57 (L) 4.70 - 5.50 M/uL    HGB 13.8 13.0 - 16.0 g/dL    HCT 41.9 36.0 - 48.0 %    MCV 91.7 74.0 - 97.0 FL    MCH 30.2 24.0 - 34.0 PG    MCHC 32.9 31.0 - 37.0 g/dL    RDW 16.1 (H) 11.6 - 14.5 %    PLATELET 632 683 - 971 K/uL    MPV 9.2 9.2 - 11.8 FL    NEUTROPHILS 88 (H) 40 - 73 %    LYMPHOCYTES 5 (L) 21 - 52 %    MONOCYTES 7 3 - 10 %    EOSINOPHILS 0 0 - 5 %    BASOPHILS 0 0 - 2 %    ABS. NEUTROPHILS 19.6 (H) 1.8 - 8.0 K/UL    ABS. LYMPHOCYTES 1.1 0.9 - 3.6 K/UL    ABS. MONOCYTES 1.5 (H) 0.05 - 1.2 K/UL    ABS. EOSINOPHILS 0.0 0.0 - 0.4 K/UL    ABS. BASOPHILS 0.0 0.0 - 0.1 K/UL    DF AUTOMATED     METABOLIC PANEL, COMPREHENSIVE    Collection Time: 01/07/21  3:09 PM   Result Value Ref Range    Sodium 138 136 - 145 mmol/L    Potassium 4.3 3.5 - 5.5 mmol/L    Chloride 100 100 - 111 mmol/L    CO2 25 21 - 32 mmol/L    Anion gap 13 3.0 - 18 mmol/L    Glucose 149 (H) 74 - 99 mg/dL    BUN 35 (H) 7.0 - 18 MG/DL    Creatinine 1.69 (H) 0.6 - 1.3 MG/DL    BUN/Creatinine ratio 21 (H) 12 - 20      GFR est AA 48 (L) >60 ml/min/1.73m2    GFR est non-AA 39 (L) >60 ml/min/1.73m2    Calcium 9.6 8.5 - 10.1 MG/DL    Bilirubin, total 1.0 0.2 - 1.0 MG/DL    ALT (SGPT) 29 16 - 61 U/L    AST (SGOT) 55 (H) 10 - 38 U/L    Alk.  phosphatase 95 45 - 117 U/L    Protein, total 8.6 (H) 6.4 - 8.2 g/dL    Albumin 3.4 3.4 - 5.0 g/dL    Globulin 5.2 (H) 2.0 - 4.0 g/dL    A-G Ratio 0.7 (L) 0.8 - 1.7     CARDIAC PANEL,(CK, CKMB & TROPONIN)    Collection Time: 01/07/21  3:09 PM   Result Value Ref Range    CK - MB 5.1 (H) <3.6 ng/ml    CK-MB Index 0.2 0.0 - 4.0 %    CK 2,090 (H) 39 - 308 U/L    Troponin-I, QT 0.03 0.0 - 0.045 NG/ML   NT-PRO BNP    Collection Time: 01/07/21  3:09 PM   Result Value Ref Range    NT pro-BNP 3,258 (H) 0 - 1,800 PG/ML   PROTHROMBIN TIME + INR    Collection Time: 01/07/21  3:09 PM   Result Value Ref Range    Prothrombin time 16.0 (H) 11.5 - 15.2 sec    INR 1.3 (H) 0.8 - 1.2     PTT    Collection Time: 01/07/21  3:09 PM   Result Value Ref Range    aPTT 44.6 (H) 23.0 - 36.4 SEC   POC LACTIC ACID    Collection Time: 01/07/21  3:13 PM   Result Value Ref Range    Lactic Acid (POC) 2.98 (HH) 0.40 - 2.00 mmol/L   URINALYSIS W/ RFLX MICROSCOPIC    Collection Time: 01/07/21  6:03 PM   Result Value Ref Range    Color YELLOW      Appearance TURBID      Specific gravity 1.013 1.005 - 1.030      pH (UA) 5.5 5.0 - 8.0      Protein 100 (A) NEG mg/dL    Glucose Negative NEG mg/dL    Ketone Negative NEG mg/dL    Bilirubin Negative NEG      Blood LARGE (A) NEG      Urobilinogen 0.2 0.2 - 1.0 EU/dL    Nitrites Positive (A) NEG      Leukocyte Esterase LARGE (A) NEG     URINE MICROSCOPIC ONLY    Collection Time: 01/07/21  6:03 PM   Result Value Ref Range    WBC TOO NUMEROUS TO COUNT 0 - 5 /hpf    RBC 0 to 3 0 - 5 /hpf    Epithelial cells FEW 0 - 5 /lpf    Bacteria 4+ (A) NEG /hpf       Radiologic Studies -   XR CHEST PORT   Final Result   IMPRESSION:      No active cardiopulmonary disease. CT SPINE LUMB WO CONT   Final Result   IMPRESSION:   1. Postoperative changes from posterior instrumented spinal fusion,   decompression, and interlaminar fixation L2-S1 without acute osseous abnormality   demonstrated. 2. Limited assessment of the contents of the spinal canal secondary to extensive   artifact from indwelling spinal instrumentation. No definite findings on this   examination to suggest a high-grade osseous canal stenosis with the majority of   the lumbar spine posteriorly decompressed. 3. No high-grade neural foraminal stenosis. CT SPINE LUMB W CONT    (Results Pending)   XR MYELO LUMBAR    (Results Pending)     CT Results  (Last 48 hours)               01/07/21 1607  CT SPINE LUMB WO CONT Final result    Impression:  IMPRESSION:   1. Postoperative changes from posterior instrumented spinal fusion,   decompression, and interlaminar fixation L2-S1 without acute osseous abnormality   demonstrated.    2. Limited assessment of the contents of the spinal canal secondary to extensive artifact from indwelling spinal instrumentation. No definite findings on this   examination to suggest a high-grade osseous canal stenosis with the majority of   the lumbar spine posteriorly decompressed. 3. No high-grade neural foraminal stenosis. Narrative:  Examination: CT lumbar spine without contrast.       HISTORY: Patient with increased weakness in bilateral lower extremities,   progressive over several days. TECHNIQUE: CT imaging of the lumbar spine was performed in the axial plane   utilizing both bone and soft tissue reconstruction algorithms. Standard coronal   and sagittal reformatted images were performed by the technologist and are   included in interpretation. One or more dose reduction techniques were used on this CT: automated exposure   control, adjustment of the mAs and/or kVp according to patient size, and   iterative reconstruction techniques. The specific techniques used on this CT   exam have been documented in the patient's electronic medical record. Digital   Imaging and Communications in Medicine (DICOM) format image data are available   to nonaffiliated external healthcare facilities or entities on a secure, media   free, reciprocally searchable basis with patient authorization for at least a   12-month period after this study. COMPARISON: No relevant prior imaging is available for review or comparison. FINDINGS:       There is mild dextrorotatory curvature of the thoracolumbar junction   demonstrated. Sagittal reformatted imaging demonstrates mild anterolisthesis of   L5 on S1. There are postoperative changes from posterior instrumented spinal fusion   extending from L2-S1. Interlaminar fixation cages are noted at L2-L3, L3-L4,   L4-L5, and L5-S1. Anterior plate and screw construct across the L5-S1 level is   present. There are paired pedicular screws present at the posteriorly   decompressed/fused levels.  Slight medial position of the right L2 pedicle screw   noted. L3-L5 pedicle screws appear centered within the respective pedicles. Minor anterior cortical penetration of the right S1 body screw noted. Vertebral body heights appear preserved. No compression fracture demonstrated. Sacrum is intact. Mild bilateral SI joint osteoarthritis. No suspicious   appearing lytic or blastic osseous lesion. Secondary to beam hardening artifact incurred from the indwelling spinal   instrumentation, contents of the spinal canal are not well assessed by this   examination. Prominent disc osteophyte at the L2-L3 level noted with likely   ventral impression of thecal sac; however detail is limited. No high-grade neuroforaminal stenosis demonstrated. Review retroperitoneal soft tissue structures demonstrates relatively diffuse   aortobiiliac atherosclerotic vascular calcification without evidence of   aneurysmal dilatation. No adenopathy or free pelvic fluid. CXR Results  (Last 48 hours)               01/07/21 1624  XR CHEST PORT Final result    Impression:  IMPRESSION:       No active cardiopulmonary disease. Narrative:  EXAM: XR CHEST PORT       CLINICAL INDICATION/HISTORY: sepsis     > Additional: None. COMPARISON: June 17, 2014       TECHNIQUE: Portable chest       _______________       FINDINGS:       SUPPORT DEVICES: Left subclavian approach cardiac pacer device is now seen with   leads projecting over the right atrium, right ventricle, and coronary sinus. HEART AND MEDIASTINUM: Normal size and contour. Normal pulmonary vasculature. LUNGS AND PLEURAL SPACES: The lungs are well expanded and clear. No focal   consolidation, effusion, or pneumothorax.        BONY THORAX AND SOFT TISSUES: Left shoulder arthroplasty hardware is noted with   degenerative changes present in both acromioclavicular joints.       _______________                 Medications given in the ED-  Medications   sodium chloride 0.9 % bolus infusion 1,000 mL (1,000 mL IntraVENous New Bag 1/7/21 1648)     Followed by   sodium chloride 0.9 % bolus infusion 1,000 mL (has no administration in time range)     Followed by   sodium chloride 0.9 % bolus infusion 190 mL (has no administration in time range)   morphine injection 4 mg (has no administration in time range)   iopamidoL (ISOVUE-M 200) 41 % contrast solution 1-20 mL (has no administration in time range)   lidocaine (PF) (XYLOCAINE) 10 mg/mL (1 %) injection 1-10 mL (has no administration in time range)   cefTRIAXone (ROCEPHIN) 1 g in sterile water (preservative free) 10 mL IV syringe (1 g IntraVENous Given 1/7/21 1538)   morphine injection 2 mg (2 mg IntraVENous Given 1/7/21 1541)   ondansetron (ZOFRAN) injection 4 mg (4 mg IntraVENous Given 1/7/21 1541)         Medical Decision Making   I am the first provider for this patient. I reviewed the vital signs, available nursing notes, past medical history, past surgical history, family history and social history. Vital Signs-Reviewed the patient's vital signs. Pulse Oximetry Analysis - 99% on room air, not hypoxic     Cardiac Monitor:  Rate: 104 bpm  Rhythm: Paced and tachycardic    EKG interpretation: (Preliminary)  EKG read by Dr. Lisset Chowdhury at 3:16 PM  Paced at a rate of 114 bpm, MS interval 124 ms, QRS duration of 142 ms, pacemaker appears new when compared to prior that is available for comparison from July 2017    Records Reviewed: Nursing Notes, Old Medical Records and Previous electrocardiograms    Provider Notes (Medical Decision Making): Andrzej Agarwal is a 68 y.o. male presenting with low back pain and lower extremity weakness and inability to ambulate. Patient does have found lower extremity weakness and severe low back pain on exam.  Symptoms are concerning for cauda equina but patient is unable to get MRI due to pacemaker. CT dry obtained without acute process but a limited study.   Discussed with orthopedics who recommended CT myelogram.  Arranged for stat CT myelogram with interventional radiology. In addition patient met sepsis criteria on arrival with elevated lactate and white count and has a UTI. Urine culture sent and IV antibiotics initiated. Discussed with hospitalist for further inpatient management. Patient and his wife understand and agree with this plan. Procedures:  Procedures    ED Course:   CONSULT NOTE:   6:02 PM  Dr. Keyla Lubin spoke with Dr. Cody Galloway   Specialty: Orthopedics  Discussed pt's hx, disposition, and available diagnostic and imaging results over the telephone. Reviewed care plans. Would recommend STAT CT myelogram to evaluate for cauda eqina. CONSULT NOTE:   6:19 PM  Dr. Keyla Lubin spoke with Dr. Leonardo Cummings   Specialty: Interventional Radiologist  Discussed pt's hx, disposition, and available diagnostic and imaging results over the telephone. Reviewed care plans. Will arrange for CT myelogram.     6:27 PM  Updated patient on all results and plan. All questions answered. CONSULT NOTE:   6:39 PM  Dr. Keyla Lubin spoke with Dr. Catalina Cardoso   Specialty: Hospitalist  Discussed pt's hx, disposition, and available diagnostic and imaging results over the telephone. Reviewed care plans. Accepts to medical.       Diagnosis and Disposition     Critical Care Time: 7:21 PM  I have spent 38 minutes of critical care time involved in lab review, consultations with specialist, family decision-making, and documentation. During this entire length of time I was immediately available to the patient. Critical Care: The reason for providing this level of medical care for this critically ill patient was due a critical illness that impaired one or more vital organ systems such that there was a high probability of imminent or life threatening deterioration in the patients condition.  This care involved high complexity decision making to assess, manipulate, and support vital system functions, to treat this degreee vital organ system failure and to prevent further life threatening deterioration of the patients condition. Core Measures:  For Hospitalized Patients:    1. Hospitalization Decision Time:  The decision to hospitalize the patient was made by Dr. Tremayne Murray at 5:30 PM on 1/7/2021    2. Aspirin: Aspirin was not given because the patient did not present with a stroke at the time of their Emergency Department evaluation    6:35 PM  Patient is being admitted to the hospital by Dr. Gan Members. The results of their tests and reasons for their admission have been discussed with them and/or available family. They convey agreement and understanding for the need to be admitted and for their admission diagnosis. CONDITIONS ON ADMISSION:  Sepsis is present at the time of admission. Deep Vein Thrombosis is not present at the time of admission. Thrombosis is not present at the time of admission. Urinary Tract Infection is present at the time of admission. Pneumonia is not present at the time of admission. MRSA is not present at the time of admission. Wound infection is not present at the time of admission. Pressure Ulcer is not present at the time of admission. CLINICAL IMPRESSION:    1. Acute cystitis without hematuria    2. Sepsis due to urinary tract infection (HCC)    3. Weakness of both lower extremities    4. Acute midline low back pain with bilateral sciatica      _______________________________      Please note that this dictation was completed with [employer], the computer voice recognition software. Quite often unanticipated grammatical, syntax, homophones, and other interpretive errors are inadvertently transcribed by the computer software. Please disregard these errors. Please excuse any errors that have escaped final proofreading.

## 2021-01-08 ENCOUNTER — APPOINTMENT (OUTPATIENT)
Dept: CT IMAGING | Age: 81
DRG: 565 | End: 2021-01-08
Attending: HOSPITALIST
Payer: MEDICARE

## 2021-01-08 PROBLEM — T79.6XXA TRAUMATIC RHABDOMYOLYSIS (HCC): Status: ACTIVE | Noted: 2021-01-08

## 2021-01-08 LAB
ANION GAP SERPL CALC-SCNC: 12 MMOL/L (ref 3–18)
BASOPHILS # BLD: 0 K/UL (ref 0–0.1)
BASOPHILS NFR BLD: 0 % (ref 0–2)
BUN SERPL-MCNC: 38 MG/DL (ref 7–18)
BUN/CREAT SERPL: 25 (ref 12–20)
CALCIUM SERPL-MCNC: 8.8 MG/DL (ref 8.5–10.1)
CHLORIDE SERPL-SCNC: 103 MMOL/L (ref 100–111)
CK SERPL-CCNC: 1317 U/L (ref 39–308)
CO2 SERPL-SCNC: 24 MMOL/L (ref 21–32)
CREAT SERPL-MCNC: 1.55 MG/DL (ref 0.6–1.3)
DIFFERENTIAL METHOD BLD: ABNORMAL
EOSINOPHIL # BLD: 0 K/UL (ref 0–0.4)
EOSINOPHIL NFR BLD: 0 % (ref 0–5)
ERYTHROCYTE [DISTWIDTH] IN BLOOD BY AUTOMATED COUNT: 16.3 % (ref 11.6–14.5)
GLUCOSE SERPL-MCNC: 125 MG/DL (ref 74–99)
HCT VFR BLD AUTO: 36 % (ref 36–48)
HGB BLD-MCNC: 11.7 G/DL (ref 13–16)
LYMPHOCYTES # BLD: 1.1 K/UL (ref 0.9–3.6)
LYMPHOCYTES NFR BLD: 8 % (ref 21–52)
MAGNESIUM SERPL-MCNC: 2.3 MG/DL (ref 1.6–2.6)
MCH RBC QN AUTO: 29.7 PG (ref 24–34)
MCHC RBC AUTO-ENTMCNC: 32.5 G/DL (ref 31–37)
MCV RBC AUTO: 91.4 FL (ref 74–97)
MONOCYTES # BLD: 1.1 K/UL (ref 0.05–1.2)
MONOCYTES NFR BLD: 8 % (ref 3–10)
NEUTS SEG # BLD: 11.5 K/UL (ref 1.8–8)
NEUTS SEG NFR BLD: 84 % (ref 40–73)
PLATELET # BLD AUTO: 242 K/UL (ref 135–420)
PMV BLD AUTO: 9.2 FL (ref 9.2–11.8)
POTASSIUM SERPL-SCNC: 4.3 MMOL/L (ref 3.5–5.5)
RBC # BLD AUTO: 3.94 M/UL (ref 4.7–5.5)
SODIUM SERPL-SCNC: 139 MMOL/L (ref 136–145)
WBC # BLD AUTO: 13.7 K/UL (ref 4.6–13.2)

## 2021-01-08 PROCEDURE — 82550 ASSAY OF CK (CPK): CPT

## 2021-01-08 PROCEDURE — 83735 ASSAY OF MAGNESIUM: CPT

## 2021-01-08 PROCEDURE — 74011250637 HC RX REV CODE- 250/637: Performed by: ORTHOPAEDIC SURGERY

## 2021-01-08 PROCEDURE — 65270000029 HC RM PRIVATE

## 2021-01-08 PROCEDURE — 70450 CT HEAD/BRAIN W/O DYE: CPT

## 2021-01-08 PROCEDURE — 74011000250 HC RX REV CODE- 250: Performed by: HOSPITALIST

## 2021-01-08 PROCEDURE — 74011250637 HC RX REV CODE- 250/637: Performed by: HOSPITALIST

## 2021-01-08 PROCEDURE — 36415 COLL VENOUS BLD VENIPUNCTURE: CPT

## 2021-01-08 PROCEDURE — 74011250636 HC RX REV CODE- 250/636: Performed by: HOSPITALIST

## 2021-01-08 PROCEDURE — 80048 BASIC METABOLIC PNL TOTAL CA: CPT

## 2021-01-08 PROCEDURE — 85025 COMPLETE CBC W/AUTO DIFF WBC: CPT

## 2021-01-08 RX ORDER — DIAZEPAM 5 MG/1
2.5 TABLET ORAL
Status: DISPENSED | OUTPATIENT
Start: 2021-01-08 | End: 2021-01-12

## 2021-01-08 RX ADMIN — MORPHINE SULFATE 2 MG: 2 INJECTION, SOLUTION INTRAMUSCULAR; INTRAVENOUS at 00:12

## 2021-01-08 RX ADMIN — MORPHINE SULFATE 2 MG: 2 INJECTION, SOLUTION INTRAMUSCULAR; INTRAVENOUS at 22:13

## 2021-01-08 RX ADMIN — SODIUM CHLORIDE 50 ML/HR: 900 INJECTION, SOLUTION INTRAVENOUS at 18:23

## 2021-01-08 RX ADMIN — HEPARIN SODIUM 5000 UNITS: 5000 INJECTION INTRAVENOUS; SUBCUTANEOUS at 18:23

## 2021-01-08 RX ADMIN — DIAZEPAM 2.5 MG: 5 TABLET ORAL at 18:23

## 2021-01-08 RX ADMIN — MORPHINE SULFATE 2 MG: 2 INJECTION, SOLUTION INTRAMUSCULAR; INTRAVENOUS at 06:20

## 2021-01-08 RX ADMIN — Medication 10 ML: at 18:25

## 2021-01-08 RX ADMIN — MORPHINE SULFATE 2 MG: 2 INJECTION, SOLUTION INTRAMUSCULAR; INTRAVENOUS at 15:40

## 2021-01-08 RX ADMIN — DOCUSATE SODIUM 100 MG: 100 CAPSULE, LIQUID FILLED ORAL at 18:23

## 2021-01-08 RX ADMIN — MORPHINE SULFATE 2 MG: 2 INJECTION, SOLUTION INTRAMUSCULAR; INTRAVENOUS at 10:29

## 2021-01-08 RX ADMIN — CEFTRIAXONE 1 G: 1 INJECTION, POWDER, FOR SOLUTION INTRAMUSCULAR; INTRAVENOUS at 15:43

## 2021-01-08 NOTE — PROGRESS NOTES
Neuroradiology Brief Procedure Note    Interventional Radiologist: Edi Johnson MD    Pre-operative Diagnosis: Bilateral lower extremity weakness, severe low back pain    Post-operative Diagnosis: Same as pre-operative diagnosis    Procedure(s) Performed:  Lumbar Myelogram    Anesthesia:  Local    Findings:  Informed consent.  Lumbar myelogram performed.  Please see final dictated report for full details.    Complications: No immediate    Estimated Blood Loss:  None    Specimens: None    Edi Johnson MD  [facility]  Neuroradiology  1/7/2021

## 2021-01-08 NOTE — PROGRESS NOTES
Hospitalist Progress Note-critical care note     Patient: Clifton Rodriguez MRN: [id number]  CSN: [id number]    YOB: 1940  Age: 80 y.o.  Sex: male    DOA: 1/7/2021 LOS:  LOS: 1 day            Chief complaint: rhabdo , uti , elevated cr, bilateral weakness     Assessment/Plan         Hospital Problems  Date Reviewed: 7/7/2014          Codes Class Noted POA    Traumatic rhabdomyolysis (HCC) ICD-10-CM: T79.6XXA  ICD-9-CM: 958.6  1/8/2021 Unknown        UTI (urinary tract infection) ICD-10-CM: N39.0  ICD-9-CM: 599.0  1/7/2021 Unknown        * (Principal) Weakness of both legs ICD-10-CM: R29.898  ICD-9-CM: 729.89  1/7/2021 Unknown        Status cardiac pacemaker ICD-10-CM: Z95.0  ICD-9-CM: V45.01  1/7/2021 Unknown        Elevated serum creatinine ICD-10-CM: R79.89  ICD-9-CM: 790.99  1/7/2021 Unknown        Fall at home ICD-10-CM: W19.XXXA, Y92.009  ICD-9-CM: E888.9, E849.0  1/7/2021 Unknown        HTN (hypertension) ICD-10-CM: I10  ICD-9-CM: 401.9  4/16/2013 Yes              Weakness    myelo gram per IR done, no high stenosis , L2 fracture -not sure age    waiting for ortho intake-RN will remind ortho to see pt   Will have ct head to see from central neuro system        Rhabd   Due to recent fall   Continue iv hydration   Ck monitoring     Elevated cr -mild improving   Ns infusion   Continue monitoring cr     Cad so far stable      Pacemaker   Will remain electrolytes balance         Fall at home   Fall precaution       HTN, accelerated  Hold meds for now due to border line bp     Subjective: feel fine , pain is controlled , I had L2 fracture before   Rn: try to reach ortho      Disposition :tbd,   Review of systems:    General: No fevers or chills.  Cardiovascular: No chest pain or pressure. No palpitations.   Pulmonary: No shortness of breath.   Gastrointestinal: No nausea, vomiting.   Msk: leg weakness     Vital signs/Intake and Output:  Visit Vitals  /65 (BP 1 Location: Left arm, BP Patient  Position: At rest)   Pulse 92   Temp 98.6 °F (37 °C)   Resp 16   Ht 5' 10\" (1.778 m)   Wt 94.3 kg (208 lb)   SpO2 98%   BMI 29.84 kg/m²     Current Shift:  No intake/output data recorded. Last three shifts:  No intake/output data recorded. Physical Exam:  General: WD, WN. Alert, cooperative, no acute distress    HEENT: NC, Atraumatic. PERRLA, anicteric sclerae. Lungs: CTA Bilaterally. No Wheezing/Rhonchi/Rales. Heart:  Regular  rhythm,  No murmur, No Rubs, No Gallops  Abdomen: Soft, Non distended, Non tender. +Bowel sounds,   Extremities: No c/c/e  Psych:   Not anxious or agitated. Neurologic:  No acute neurological deficit except bilateral LE weakness ,sensation same           Labs: Results:       Chemistry Recent Labs     01/08/21  0120 01/07/21  1509   * 149*    138   K 4.3 4.3    100   CO2 24 25   BUN 38* 35*   CREA 1.55* 1.69*   CA 8.8 9.6   AGAP 12 13   BUCR 25* 21*   AP  --  95   TP  --  8.6*   ALB  --  3.4   GLOB  --  5.2*   AGRAT  --  0.7*      CBC w/Diff Recent Labs     01/08/21  0120 01/07/21  1509   WBC 13.7* 22.1*   RBC 3.94* 4.57*   HGB 11.7* 13.8   HCT 36.0 41.9    296   GRANS 84* 88*   LYMPH 8* 5*   EOS 0 0      Cardiac Enzymes Recent Labs     01/07/21  1509   CPK 2,090*   CKND1 0.2      Coagulation Recent Labs     01/07/21  1509   PTP 16.0*   INR 1.3*   APTT 44.6*       Lipid Panel No results found for: CHOL, CHOLPOCT, CHOLX, CHLST, CHOLV, 933850, HDL, HDLP, LDL, LDLC, DLDLP, 959138, VLDLC, VLDL, TGLX, TRIGL, TRIGP, TGLPOCT, CHHD, CHHDX   BNP No results for input(s): BNPP in the last 72 hours.    Liver Enzymes Recent Labs     01/07/21  1509   TP 8.6*   ALB 3.4   AP 95      Thyroid Studies No results found for: T4, T3U, TSH, TSHEXT, TSHEXT     Procedures/imaging: see electronic medical records for all procedures/Xrays and details which were not copied into this note but were reviewed prior to creation of Plan    Xr Myelo Lumbar    Result Date: 1/7/2021  EXAM: LUMBAR SPINE MYELOGRAM AND POSTMYELOGRAM LUMBAR SPINE CT INDICATION: Bilateral lower extremity weakness, severe low back pain, prior lumbar fusion, possible cauda equina syndrome, cardiac pacing device unable to undergo MRI COMPARISON: Noncontrast CT lumbar spine earlier the same day TECHNIQUE: Written informed consent was obtained from the patient after thorough explanation of the procedure, risks, and benefits. The patient was placed prone upon the fluoroscopy table. The patient's back was prepped and draped in the normal sterile fashion. 1% lidocaine buffered with sodium bicarbonate was used for local anesthetic. Next using fluoroscopic guidance, a 22 gauge three and half-inch spinal needle was advanced into the thecal sac at the L3 level through laminectomy defect. Immediate CSF return was seen. Then approximately 15 cc of Isovue 200-M contrast was slowly instilled into the thecal sac under intermittent fluoroscopic observation. The stylet was replaced. The needle was removed. Hemostasis was achieved. There were no immediate complications. Multiple routine digital fluoroscopic images were then obtained. Patient was unable to tolerate upright imaging due to leg weakness. The patient was sent to the CT suite for a postmyelogram lumbar spine CT. Multiple axial CT images of the lumbar spine were performed. Sagittal and coronal reconstructions were also performed. One or more dose reduction techniques were used on this CT: automated exposure control, adjustment of the mAs and/or kVp according to patient's size, and iterative reconstruction techniques. The specific techniques utilized on this CT exam have been documented in the patient's electronic medical record.  Digital Imaging and Communications in Medicine (DICOM) format image data are available to nonaffiliated external healthcare facilities or entities on a secure, media free, reciprocally searchable basis with patient authorization for at least a 12-month period after this study. Fluoroscopy radiation dose (reference air kerma), in mGy: 56.50 GUIDANCE: Fluoroscopic guidance was used to position (and confirm the position of) the spinal needle. Image(s) saved in PACS: Fluoroscopy ____________________ FINDINGS: LUMBAR MYELOGRAM The patient has previously undergone spinal fusion of the L2-S1 levels with bipedicular screws and fusion rods. Accompanying laminectomy changes are present at L2-L5 levels. Additional interbody graft fusion has been performed of each of the disc levels. The L5-S1 interbody graft is from an anterior approach with interlocking screws. Slight L1-L2 retrolisthesis is present. Of note, upright imaging not able to be obtained. Patient had limited mobility on the table limiting overall myelographic evaluation. No high-grade central stenosis is seen. No hardware failure is seen. Lucency is associated with the right L2 pedicle screw exuberant paraspinal calcification/osteophyte is present at the L1-L2 junctional level. Please refer to the post-myelogram CT findings below for further details. POSTMYELOGRAM LUMBAR SPINE CT Post surgical changes from L2-S1 spinal fusion are again with bipedicular screws and interlocking fusion rods. Additional interbody graft fusion is present at each of the 4 disc levels with anterior approach for the L5-S1 interbody graft. Accompanying laminectomy changes are seen. Slight L1-L2 retrolisthesis is present. There is abnormal hypodensity associated with the superior lateral aspect of the L2 vertebral body with a somewhat oblique configuration. Slight depression of the superior endplate is present. This hypodensity represents a fracture line and extends to the superior margin of the pedicle screw and further posteriorly through the right pedicle through the osseous graft fusion material. Adjacent hypodensity is present partially surrounding this right L2 pedicle screw.  There may be an additional incomplete fracture line through the inferior aspect of the L1 spinous process. There is prominent curvilinear calcification representing callus formation and/or developing osteophyte along the lateral margins of this L1-2 disc space. No solid bridging bone is present. Other vertebral body heights are preserved. The conus medullaris is located at the L1-L2 level and has a normal appearance. No distal cord or conus impingement is seen. T12-L1 level: There is no central or foraminal stenosis. L1/2 level: Disc bulge is present slightly uncovered by the retrolisthesis. No central stenosis is present. Mild foraminal stenosis is present. L2/3 level: This level is a prior surgical fusion level. Thickened calcification is present along the posterior margin of the disc, perhaps representing ossification of posterior longitudinal ligament or osseous graft fusion material, unchanged. Minimal foraminal narrowing is present. No central stenosis is present. L3/4 level: This level is a prior surgical fusion level. There is no central or foraminal stenosis. L4/5 level: This level is a prior surgical fusion level. There is no central or foraminal stenosis. L5/S1 level: This level is a prior surgical fusion level. Hypertrophic changes produce moderate bilateral foraminal stenosis. No central stenosis is present. Partially visualized prostate is enlarged and impresses upon the base of the urinary bladder. There is abnormal bladder wall thickening present. Small hypodense outpouchings are seen laterally suggesting bladder diverticula. Atherosclerotic calcifications are present. ____________________     IMPRESSION: 1. Postsurgical changes from prior L2-S1 instrument fusion with bipedicular screws and interlocking fusion rods as well as interbody graft fusion placement at each of the fusion levels including anterior approach L5-S1 grafts with interlocking screws. Accompanying laminectomies are seen. -No central stenosis at the postsurgical levels.  No impingement upon the distal cord/conus. 2. Fracture involving the superior lateral aspect of the L2 vertebral body extending along the right L2 screw through the right pedicle. Fracture age is strictly indeterminate but suspect subacute. Prominent paraspinal callus formation and developing osteophyte. 3. Slight L1-L2 retrolisthesis without short-term interval change. Patient unable to tolerate upright or flexion/extension positioning for further evaluation. 4. No additional fracture is seen. 5. No high-grade central or foraminal stenosis. Additional mild multilevel degenerative changes throughout the lumbar spine , as described in detail in Findings section. 6. Abnormal bladder wall thickening which may reflect bladder outlet obstruction and/or cystitis. Associated small bilateral bladder diverticula. Ct Spine Lumb Wo Cont    Result Date: 1/7/2021  Examination: CT lumbar spine without contrast. HISTORY: Patient with increased weakness in bilateral lower extremities, progressive over several days. TECHNIQUE: CT imaging of the lumbar spine was performed in the axial plane utilizing both bone and soft tissue reconstruction algorithms. Standard coronal and sagittal reformatted images were performed by the technologist and are included in interpretation. One or more dose reduction techniques were used on this CT: automated exposure control, adjustment of the mAs and/or kVp according to patient size, and iterative reconstruction techniques. The specific techniques used on this CT exam have been documented in the patient's electronic medical record. Digital Imaging and Communications in Medicine (DICOM) format image data are available to nonaffiliated external healthcare facilities or entities on a secure, media free, reciprocally searchable basis with patient authorization for at least a 12-month period after this study. COMPARISON: No relevant prior imaging is available for review or comparison.  FINDINGS: There is mild dextrorotatory curvature of the thoracolumbar junction demonstrated. Sagittal reformatted imaging demonstrates mild anterolisthesis of L5 on S1. There are postoperative changes from posterior instrumented spinal fusion extending from L2-S1. Interlaminar fixation cages are noted at L2-L3, L3-L4, L4-L5, and L5-S1. Anterior plate and screw construct across the L5-S1 level is present. There are paired pedicular screws present at the posteriorly decompressed/fused levels. Slight medial position of the right L2 pedicle screw noted. L3-L5 pedicle screws appear centered within the respective pedicles. Minor anterior cortical penetration of the right S1 body screw noted. Vertebral body heights appear preserved. No compression fracture demonstrated. Sacrum is intact. Mild bilateral SI joint osteoarthritis. No suspicious appearing lytic or blastic osseous lesion. Secondary to beam hardening artifact incurred from the indwelling spinal instrumentation, contents of the spinal canal are not well assessed by this examination. Prominent disc osteophyte at the L2-L3 level noted with likely ventral impression of thecal sac; however detail is limited. No high-grade neuroforaminal stenosis demonstrated. Review retroperitoneal soft tissue structures demonstrates relatively diffuse aortobiiliac atherosclerotic vascular calcification without evidence of aneurysmal dilatation. No adenopathy or free pelvic fluid.     IMPRESSION: 1. Postoperative changes from posterior instrumented spinal fusion, decompression, and interlaminar fixation L2-S1 without acute osseous abnormality demonstrated. 2. Limited assessment of the contents of the spinal canal secondary to extensive artifact from indwelling spinal instrumentation. No definite findings on this examination to suggest a high-grade osseous canal stenosis with the majority of the lumbar spine posteriorly decompressed. 3. No high-grade neural foraminal stenosis.    Ct Spine Lumb W  Cont    Result Date: 1/7/2021  EXAM: LUMBAR SPINE MYELOGRAM AND POSTMYELOGRAM LUMBAR SPINE CT INDICATION: Bilateral lower extremity weakness, severe low back pain, prior lumbar fusion, possible cauda equina syndrome, cardiac pacing device unable to undergo MRI COMPARISON: Noncontrast CT lumbar spine earlier the same day TECHNIQUE: Written informed consent was obtained from the patient after thorough explanation of the procedure, risks, and benefits. The patient was placed prone upon the fluoroscopy table. The patient's back was prepped and draped in the normal sterile fashion. 1% lidocaine buffered with sodium bicarbonate was used for local anesthetic. Next using fluoroscopic guidance, a 22 gauge three and half-inch spinal needle was advanced into the thecal sac at the L3 level through laminectomy defect. Immediate CSF return was seen. Then approximately 15 cc of Isovue 200-M contrast was slowly instilled into the thecal sac under intermittent fluoroscopic observation. The stylet was replaced. The needle was removed. Hemostasis was achieved. There were no immediate complications. Multiple routine digital fluoroscopic images were then obtained. Patient was unable to tolerate upright imaging due to leg weakness. The patient was sent to the CT suite for a postmyelogram lumbar spine CT. Multiple axial CT images of the lumbar spine were performed. Sagittal and coronal reconstructions were also performed. One or more dose reduction techniques were used on this CT: automated exposure control, adjustment of the mAs and/or kVp according to patient's size, and iterative reconstruction techniques. The specific techniques utilized on this CT exam have been documented in the patient's electronic medical record.  Digital Imaging and Communications in Medicine (DICOM) format image data are available to nonaffiliated external healthcare facilities or entities on a secure, media free, reciprocally searchable basis with patient authorization for at least a 12-month period after this study. Fluoroscopy radiation dose (reference air kerma), in mGy: 56.50 GUIDANCE: Fluoroscopic guidance was used to position (and confirm the position of) the spinal needle. Image(s) saved in PACS: Fluoroscopy ____________________ FINDINGS: LUMBAR MYELOGRAM The patient has previously undergone spinal fusion of the L2-S1 levels with bipedicular screws and fusion rods. Accompanying laminectomy changes are present at L2-L5 levels. Additional interbody graft fusion has been performed of each of the disc levels. The L5-S1 interbody graft is from an anterior approach with interlocking screws. Slight L1-L2 retrolisthesis is present. Of note, upright imaging not able to be obtained. Patient had limited mobility on the table limiting overall myelographic evaluation. No high-grade central stenosis is seen. No hardware failure is seen. Lucency is associated with the right L2 pedicle screw exuberant paraspinal calcification/osteophyte is present at the L1-L2 junctional level. Please refer to the post-myelogram CT findings below for further details. POSTMYELOGRAM LUMBAR SPINE CT Post surgical changes from L2-S1 spinal fusion are again with bipedicular screws and interlocking fusion rods. Additional interbody graft fusion is present at each of the 4 disc levels with anterior approach for the L5-S1 interbody graft. Accompanying laminectomy changes are seen. Slight L1-L2 retrolisthesis is present. There is abnormal hypodensity associated with the superior lateral aspect of the L2 vertebral body with a somewhat oblique configuration. Slight depression of the superior endplate is present. This hypodensity represents a fracture line and extends to the superior margin of the pedicle screw and further posteriorly through the right pedicle through the osseous graft fusion material. Adjacent hypodensity is present partially surrounding this right L2 pedicle screw.  There may be an additional incomplete fracture line through the inferior aspect of the L1 spinous process. There is prominent curvilinear calcification representing callus formation and/or developing osteophyte along the lateral margins of this L1-2 disc space. No solid bridging bone is present. Other vertebral body heights are preserved. The conus medullaris is located at the L1-L2 level and has a normal appearance. No distal cord or conus impingement is seen. T12-L1 level: There is no central or foraminal stenosis. L1/2 level: Disc bulge is present slightly uncovered by the retrolisthesis. No central stenosis is present. Mild foraminal stenosis is present. L2/3 level: This level is a prior surgical fusion level. Thickened calcification is present along the posterior margin of the disc, perhaps representing ossification of posterior longitudinal ligament or osseous graft fusion material, unchanged. Minimal foraminal narrowing is present. No central stenosis is present. L3/4 level: This level is a prior surgical fusion level. There is no central or foraminal stenosis. L4/5 level: This level is a prior surgical fusion level. There is no central or foraminal stenosis. L5/S1 level: This level is a prior surgical fusion level. Hypertrophic changes produce moderate bilateral foraminal stenosis. No central stenosis is present. Partially visualized prostate is enlarged and impresses upon the base of the urinary bladder. There is abnormal bladder wall thickening present. Small hypodense outpouchings are seen laterally suggesting bladder diverticula. Atherosclerotic calcifications are present. ____________________     IMPRESSION: 1. Postsurgical changes from prior L2-S1 instrument fusion with bipedicular screws and interlocking fusion rods as well as interbody graft fusion placement at each of the fusion levels including anterior approach L5-S1 grafts with interlocking screws.  Accompanying laminectomies are seen. -No central stenosis at the postsurgical levels. No impingement upon the distal cord/conus. 2. Fracture involving the superior lateral aspect of the L2 vertebral body extending along the right L2 screw through the right pedicle. Fracture age is strictly indeterminate but suspect subacute. Prominent paraspinal callus formation and developing osteophyte. 3. Slight L1-L2 retrolisthesis without short-term interval change. Patient unable to tolerate upright or flexion/extension positioning for further evaluation. 4. No additional fracture is seen. 5. No high-grade central or foraminal stenosis. Additional mild multilevel degenerative changes throughout the lumbar spine , as described in detail in Findings section. 6. Abnormal bladder wall thickening which may reflect bladder outlet obstruction and/or cystitis. Associated small bilateral bladder diverticula. Xr Chest Port    Result Date: 1/7/2021  EXAM: XR CHEST PORT CLINICAL INDICATION/HISTORY: sepsis   > Additional: None. COMPARISON: June 17, 2014 TECHNIQUE: Portable chest _______________ FINDINGS: SUPPORT DEVICES: Left subclavian approach cardiac pacer device is now seen with leads projecting over the right atrium, right ventricle, and coronary sinus. HEART AND MEDIASTINUM: Normal size and contour. Normal pulmonary vasculature. LUNGS AND PLEURAL SPACES: The lungs are well expanded and clear. No focal consolidation, effusion, or pneumothorax. BONY THORAX AND SOFT TISSUES: Left shoulder arthroplasty hardware is noted with degenerative changes present in both acromioclavicular joints. _______________     IMPRESSION: No active cardiopulmonary disease.       Roman Mendoza MD

## 2021-01-08 NOTE — PROGRESS NOTES
Reason for Admission: Weakness of both legs    Chart reviewed. Per H&P: Linh Valenzuela is a 68 y.o. male with cad, chf ,chronic spinal stenosis was sent to ER after fall last weekend. He fell from 13 stairs and landed on his stomach, not reported any other injury except some broken skin on legs, no LOC. Wife help him up and he had been doing fine till two days later weakness in bilateral LE, left greater than RT. Denies any urinary incontinence. he had back surgery last June per [facility] neurosurgeon. He also was found uti. Orthopedics was consulted and recommend to have myelogram due to not a candidate for mri spine s/p pace maker. ct spine:No definite findings on this examination to suggest a high-grade osseous canal stenosis with the majority of the lumbar spine posteriorly decompressed. Chart reviewed. CM met with the patient and informed him of the CM's role. CM to follow the patient's progress and be available to assist as needed. 1320: CM met with the patient and his wife at the bedside and discussed the possibility of the patient being open to home health services upon discharge. The patient and his wife are adamant that they do not want home health service after discharge. RUR Score:  15%                   Plan for utilizing home health: TBD          PCP: First and Last name:  Dede Ospina   Name of Practice: [address] [place] [address]   Are you a current patient: Yes/No: Yes   Approximate date of last visit:    Can you participate in a virtual visit with your PCP:                     Current Advanced Directive/Advance Care Plan: Full code                         Transition of Care Plan: In progress    Care Management Interventions  PCP Verified by CM:  Yes  Transition of Care Consult (CM Consult): Discharge Planning  Current Support Network: Lives with Spouse  The Plan for Transition of Care is Related to the Following Treatment Goals : Weakness of both legs

## 2021-01-08 NOTE — ED NOTES
TRANSFER - OUT REPORT:    Verbal report given to Maite(name) on Kei Siegel  being transferred to [facility](unit) for routine progression of care       Report consisted of patients Situation, Background, Assessment and   Recommendations(SBAR). Information from the following report(s) SBAR, ED Summary and MAR was reviewed with the receiving nurse. Lines:   Peripheral IV 01/07/21 Right Antecubital (Active)   Site Assessment Clean, dry, & intact 01/07/21 1509   Phlebitis Assessment 0 01/07/21 1509   Infiltration Assessment 0 01/07/21 1509   Dressing Status Clean, dry, & intact 01/07/21 1509   Dressing Type Transparent 01/07/21 1509       Peripheral IV 01/07/21 Left Forearm (Active)   Site Assessment Clean, dry, & intact 01/07/21 1550   Phlebitis Assessment 0 01/07/21 1550   Infiltration Assessment 0 01/07/21 1550   Dressing Status Clean, dry, & intact 01/07/21 1550   Dressing Type Transparent; Other (comments) 01/07/21 1550   Hub Color/Line Status Pink 01/07/21 1550        Opportunity for questions and clarification was provided.       Patient transported with:  RN

## 2021-01-08 NOTE — CONSULTS
Consultation Note    Assessment:     Principal Problem:    Weakness of both legs (1/7/2021)    Active Problems:    HTN (hypertension) (4/16/2013)      UTI (urinary tract infection) (1/7/2021)      Status cardiac pacemaker (1/7/2021)      Elevated serum creatinine (1/7/2021)      Fall at home (1/7/2021)      Traumatic rhabdomyolysis ([address] (1/8/2021)      Generalized weakness. . w/ RLE spasms. .chronic bilateral neuropathy of BLE. No change in neurological status. UTI  Plan:     Valium for the RLE spasms. PT to evaluate. No surgical lesion . . recommend neurology consult. . plsanned    Subjective:     I was asked by Dr. Yohana Madrigal to evaluate Leonard Jeans for possible B. LE weakness. He  is a 68 y.o. male admitted on 1/7/2021 for UTI (urinary tract infection) [N39.0].       Allergies   Allergen Reactions    Percocet [Oxycodone-Acetaminophen] Nausea and Vomiting       Current Facility-Administered Medications   Medication Dose Route Frequency Provider Last Rate Last Admin    0.9% sodium chloride infusion  50 mL/hr IntraVENous CONTINUOUS Marek Vallecillo MD 50 mL/hr at 01/07/21 2142 50 mL/hr at 01/07/21 2142    sodium chloride (NS) flush 5-40 mL  5-40 mL IntraVENous Q8H Marek Vallecillo MD        sodium chloride (NS) flush 5-40 mL  5-40 mL IntraVENous PRN Marek Vallecillo MD        acetaminophen (TYLENOL) tablet 650 mg  650 mg Oral Q6H PRN Marek Vallecillo MD        Or    acetaminophen (TYLENOL) suppository 650 mg  650 mg Rectal Q6H PRN Marek Vallecillo MD        bisacodyL (DULCOLAX) suppository 10 mg  10 mg Rectal DAILY PRN Marek Vallecillo MD        promethazine (PHENERGAN) tablet 12.5 mg  12.5 mg Oral Q6H PRN Marek Vallecillo MD        Or    ondansetron [facility]) injection 4 mg  4 mg IntraVENous Q6H PRN Marek Vallecillo MD        cefTRIAXone (ROCEPHIN) 1 g in sterile water (preservative free) 10 mL IV syringe  1 g IntraVENous Q24H Marek Vallecillo MD   1 g at 01/08/21 1543    heparin (porcine) injection 5,000 Units  5,000 Units SubCUTAneous Q8H Marek Vallecillo MD   Stopped at 01/08/21 0900    morphine injection 1-2 mg  1-2 mg IntraVENous Q4H PRN Carol Tompkins MD   2 mg at 21 1540    docusate sodium (COLACE) capsule 100 mg  100 mg Oral DAILY Carol Tompkins MD           Past Medical History:   Diagnosis Date    Arrhythmia     Arthritis     CAD (coronary artery disease)     stent    Cancer ([address]     skin    Essential hypertension     H/O seasonal allergies     Hyperlipidemia     Hypertension 2004    Left knee DJD 2014     Past Surgical History:   Procedure Laterality Date    HX APPENDECTOMY      HX BACK SURGERY      HX CORONARY STENT PLACEMENT  2009    HX ORTHOPAEDIC      right elbow    HX ORTHOPAEDIC      carpal tunnel     HX ORTHOPAEDIC      left partial knee    HX PACEMAKER      defibrillator and pacemaker on left. [address] HX SHOULDER REPLACEMENT  2009    HX TONSILLECTOMY      [place] CARDIAC SURG PROCEDURE UNLIST  2009    stent placement     History reviewed. No pertinent family history. Social History     Socioeconomic History    Marital status:      Spouse name: Not on file    Number of children: Not on file    Years of education: Not on file    Highest education level: Not on file   Tobacco Use    Smoking status: Former Smoker     Quit date: 1969     Years since quittin.9    Smokeless tobacco: Never Used   Substance and Sexual Activity    Alcohol use: Not Currently    Drug use: No       Constitutional:   No weight loss    Skin:     HEENT:  . No difficulty swallowing   Cardiovascular:  No CP   Respiratory:     Gastrointestinal:  . No abdominal pain. Genitourinary:  +urinary frequency or urgency   Musculoskeletal:  BLE pain. Elan Jerry + unchanged  back pain. Endo:  No polyuria; Heme:   No unexplained fevers   Neurological:  No headache  No focal motor or sensory symptoms. No slurred speech or word finding difficulties.      Psychiatric:         Vitals:    21 0338 21 0752 21 1316 21 1622   BP: 99/73 100/74 116/67 112/65   Pulse: 80 88 89 92   Resp: 16 17 17 16   Temp: 98.3 °F (36.8 °C) 98.1 °F (36.7 °C) 98.5 °F (36.9 °C) 98.6 °F (37 °C)   SpO2: 96% 99% 96% 98%   Weight:       Height:           General appearance: alert, cooperative, no distress, appears stated age. . Coherent in all regards  The BLE are neuro intact except fo the decreased sensation in the feet and D3 tibia, anterior and medial. + spasms of the right hamstrings. Labs: Results:   CBC Recent Labs     01/08/21 0120   WBC 13.7*   HCT 36.0   MCV 91.4      Chemistry Recent Labs     01/08/21  0120 01/07/21  1509    138   CO2 24 25   BUN 38* 35*   INR  --  1.3*       Recent Labs     01/07/21  1509   AGRAT 0.7*      Urine No results for input(s): UGLU in the last 72 hours. No lab exists for component: SOURCEUR, UBILIRUBIN, UKET, USPG, UOCB, UPH, UPSC, UUROBILISQ, UNITR, URINELEUKOC   POC Glucose  No components found for: [place]       Radiographs: CYT myelogram w/o evidence of canal,conus, nerve root compromise.     Zonia Joshua MD

## 2021-01-08 NOTE — PROGRESS NOTES
S: NO Response received from On call Orthopedic surgeon. B: At 1544 pm, Orthopedic MD Dr. Shlomo Richardson was paged in accordance to seeing patient for STAT Orthopedic consult since 1/7/2021 at 6 pm. Patient presented with no Orthopedic surgery team's analysis or notes in accordance to patient consult. No response was received from DR. Abdul and at [phone] a second  page was inputted. A: At 1704 pm, on call provider Dr. Sol Thomas contacted floor and was informed of pending STAT Orthopedic consult since 1/7/2021 at 1800 pm and no assessment or Orthopedic notes on file by Surgery team. Myelogram study results was reviewed with DR. Carbajal via telephone and he was informed about L2 fracture as indicated via study. Dr. Sol Thomas verbalized understanding and stated that he would see patient. Understanding was verbalized and patient & patient's spouse at bedside will be notified. R: Will continue with prescribed plan of care as directed. Jamie Dalton  1/8/2021  5:04 pm.     Spoke with Dr Helen Tripathi in the ER last night . She informed me she was admitting the patient and asked if they had a question could the hospitatlist call me . Elan Edwards I responded in the affirmative. There was NO stat consult requested.

## 2021-01-08 NOTE — PROGRESS NOTES
S: Inquiry about Orthopedic consult. B: Patient presented with Nothing by mouth order on admission but no justification from medical staff in accordance to order. At 1430 pm, Attending Hospitalist Dr. Garrett Tilley was paged via  and at [phone] Dr. Obed Jimenez contacted unit and was inquired about plan of care for patient and inquiry about NPO order for patient. Dr. Shavonne Sargent stated that patient had myogram completed yesterday and that a STAT orthopedic consult was inputted on yesterday at 1800 pm for surgery team to see patient. Dr Shavonne Sargent stated that she would input order for patient to receive Cardiac Diet. Understanding was verbalized. A:At 0322 pm, On call Orthopedic provider DR. Vishnu Troy was paged by  to see patient. Patient family was notified that Orthopedic surgeon on call had been notified. Family at bedside were notified of Orthopedic surgeon Dr. Vishnu Troy notification. R: Will continue with prescribed plan of care as directed.   Jamie Dalton  1/8/2021  3:44 PM

## 2021-01-08 NOTE — H&P
History & Physical    Patient: Clarisse Fan MRN: [id number]  CSN: [id number]    YOB: 1940  Age: 68 y.o. Sex: male      DOA: 1/7/2021  Primary Care Provider:  Ananya Cai MD      Assessment/Plan     Hospital Problems  Date Reviewed: 7/7/2014          Codes Class Noted POA    UTI (urinary tract infection) ICD-10-CM: N39.0  ICD-9-CM: 599.0  1/7/2021 Unknown        Weakness of both legs ICD-10-CM: R29.898  ICD-9-CM: 729.89  1/7/2021 Unknown        Status cardiac pacemaker ICD-10-CM: Z95.0  ICD-9-CM: V45.01  1/7/2021 Unknown        Elevated serum creatinine ICD-10-CM: R79.89  ICD-9-CM: 790.99  1/7/2021 Unknown        Fall at home ICD-10-CM: [address] [address]. [place], [place]  ICD-9-CM: E888.9, E849.0  1/7/2021 Unknown        HTN (hypertension) ICD-10-CM: I10  ICD-9-CM: 401.9  4/16/2013 Yes              Admit to tele vs remote tele     Weakness   Will have IR for myelo gram per IR. Dr. Sosa Lucas consulted [er Dr. Froy Andre     Elevated cr   Ns infusion   Continue monitoring cr    Cad so far stable     Pacemaker   Will remain electrolytes balance       Fall at home   Fall precaution      HTN, accelerated  Hold meds for now due to border line bp      full code     Estimate  length of stay : tbd     DVT : heparin ppi proph  CC: leg weakness        HPI:     Clarisse Fan is a 68 y.o. male with cad, chf ,chronic spinal stenosis was sent to ER after fall last weekend. He fell from 13 stairs and landed on his stomach, not reported any other injury except some broken skin on legs, no LOC. Wife help him up and he had been doing fine till two days later weakness in bilateral LE, left greater than RT. Denies any urinary incontinence. he had back surgery last June per [place] neurosurgeon. He also was found uti. Orthopedics was consulted and recommend to have myelogram due to not a candidate for mri spine s/p pace maker. ct spine:No definite findings on this  examination to suggest a high-grade osseous canal stenosis with the majority of the lumbar spine posteriorly decompressed. No chest pain, no sob, no fever   Visit Vitals  BP (!) 110/55 (BP 1 Location: Left arm, BP Patient Position: Lying right side)   Pulse (!) 102   Temp 98.2 °F (36.8 °C)   Resp 16   Ht 5' 10\" (1.778 m)   Wt 94.3 kg (208 lb)   SpO2 95%   BMI 29.84 kg/m²      O2 Device: Room air      Past Medical History:   Diagnosis Date    Arrhythmia     Arthritis     CAD (coronary artery disease)     stent    [address] [address] [facility]     skin    Essential hypertension     H/O seasonal allergies     Hyperlipidemia     Hypertension 2004    Left knee DJD 2014       Past Surgical History:   Procedure Laterality Date    HX APPENDECTOMY      HX BACK SURGERY      HX CORONARY STENT PLACEMENT      HX ORTHOPAEDIC      right elbow    HX ORTHOPAEDIC      carpal tunnel     HX ORTHOPAEDIC      left partial knee    HX PACEMAKER      defibrillator and pacemaker on left. .  [employer]    HX SHOULDER REPLACEMENT  2009    HX TONSILLECTOMY      [place] CARDIAC SURG PROCEDURE UNLIST  2009    stent placement     Family History    Medical History Relation Name Comments   No Known Problems Father       Stroke Mother         Relation Name Status Comments   Father        Mother                Social History     Socioeconomic History    Marital status:      Spouse name: Not on file    Number of children: Not on file    Years of education: Not on file    Highest education level: Not on file   Tobacco Use    Smoking status: Former Smoker     Quit date: 1969     Years since quittin.9    Smokeless tobacco: Never Used   Substance and Sexual Activity    Alcohol use: Not Currently    Drug use: No       Prior to Admission medications    Medication Sig Start Date End Date Taking? Authorizing Provider   predniSONE (STERAPRED DS) 10 mg dose pack Take with food.  19   Stuart Martin PA-C   HYDROcodone-acetaminophen (NORCO) 5-325 mg per tablet Take 1 Tab by mouth every four (4) hours as needed for Pain. Max Daily Amount: 6 Tabs. 19   Samuel Wall PA-C   aspirin (ASPIRIN) 325 mg tablet Take 325 mg by mouth daily. Other, MD Sandra   fish oil-omega-3 fatty acids 340-1,000 mg capsule Take 1 Cap by mouth daily. Provider, Historical   rosuvastatin (CRESTOR) 10 mg tablet Take 10 mg by mouth nightly. Provider, Historical   multivitamin (ONE A DAY) tablet Take 1 Tab by mouth daily. Provider, Historical   valsartan-hydrochlorothiazide (DIOVAN HCT) 160-25 mg per tablet Take 1 Tab by mouth daily. Provider, Historical       Allergies   Allergen Reactions    Percocet [Oxycodone-Acetaminophen] Nausea and Vomiting       Review of Systems  Gen: No fever, chills, malaise, weight loss/gain. Heent: No headache, rhinorrhea, epistaxis, ear pain, hearing loss, sinus pain, neck pain/stiffness, sore throat. Heart: No chest pain, palpitations, CAM, pnd, or orthopnea. Resp: No cough, hemoptysis, wheezing and shortness of breath. GI: No nausea, vomiting, diarrhea, constipation, melena or hematochezia. : No urinary obstruction, dysuria or hematuria. Derm: No rash, new skin lesion or pruritis. Musc/skeletal:back pain   Vasc: No edema, cyanosis or claudication. Endo: No heat/cold intolerance, no polyuria,polydipsia or polyphagia. Neuro: bilateral  weakness, no numbness, tingling. No seizures. Heme: No easy bruising or bleeding. Physical Exam:     Physical Exam:  Visit Vitals  BP (!) 110/55 (BP 1 Location: Left arm, BP Patient Position: Lying right side)   Pulse (!) 102   Temp 98.2 °F (36.8 °C)   Resp 16   Ht 5' 10\" (1.778 m)   Wt 94.3 kg (208 lb)   SpO2 95%   BMI 29.84 kg/m²      O2 Device: Room air    Temp (24hrs), Av.4 °F (36.9 °C), Min:98.2 °F (36.8 °C), Max:98.6 °F (37 °C)    No intake/output data recorded. No intake/output data recorded. General:  Awake, cooperative, no distress.    Head:  Normocephalic, without obvious abnormality, atraumatic. Eyes:  Conjunctivae/corneas clear, sclera anicteric, PERRL, EOMs intact. Nose: Nares normal. No drainage or sinus tenderness. Throat: Lips, mucosa, and tongue normal. .   Neck: Supple, symmetrical, trachea midline, no adenopathy. Lungs:   Clear to auscultation bilaterally. Heart:  Regular rate and rhythm, S1, S2 normal, no murmur, click, rub or gallop. Abdomen: Soft, non-tender. Bowel sounds normal. No masses,  No organomegaly. Extremities: Extremities normal, atraumatic, no cyanosis or edema. Pulses: 2+ and symmetric all extremities. Skin: Skin color-pink, texture, turgor normal. Healing broken skin noted on bilateral knee, left > rt . Capillary refill normal    Neurologic: CNII-XII intact. Bilateral LE weakness, rt >left.  Sensation was intact        Labs Reviewed:    BMP:   Lab Results   Component Value Date/Time     01/07/2021 03:09 PM    K 4.3 01/07/2021 03:09 PM     01/07/2021 03:09 PM    CO2 25 01/07/2021 03:09 PM    AGAP 13 01/07/2021 03:09 PM     (H) 01/07/2021 03:09 PM    BUN 35 (H) 01/07/2021 03:09 PM    CREA 1.69 (H) 01/07/2021 03:09 PM    GFRAA 48 (L) 01/07/2021 03:09 PM    GFRNA 39 (L) 01/07/2021 03:09 PM     CMP:   Lab Results   Component Value Date/Time     01/07/2021 03:09 PM    K 4.3 01/07/2021 03:09 PM     01/07/2021 03:09 PM    CO2 25 01/07/2021 03:09 PM    AGAP 13 01/07/2021 03:09 PM     (H) 01/07/2021 03:09 PM    BUN 35 (H) 01/07/2021 03:09 PM    CREA 1.69 (H) 01/07/2021 03:09 PM    GFRAA 48 (L) 01/07/2021 03:09 PM    GFRNA 39 (L) 01/07/2021 03:09 PM    CA 9.6 01/07/2021 03:09 PM    ALB 3.4 01/07/2021 03:09 PM    TP 8.6 (H) 01/07/2021 03:09 PM    GLOB 5.2 (H) 01/07/2021 03:09 PM    AGRAT 0.7 (L) 01/07/2021 03:09 PM    ALT 29 01/07/2021 03:09 PM     CBC:   Lab Results   Component Value Date/Time    WBC 22.1 (H) 01/07/2021 03:09 PM    HGB 13.8 01/07/2021 03:09 PM    HCT 41.9 01/07/2021 03:09 PM     01/07/2021 03:09 PM     All Cardiac Markers in the last 24 hours:   Lab Results   Component Value Date/Time    CPK 2,090 (H) 01/07/2021 03:09 PM    CKMB 5.1 (H) 01/07/2021 03:09 PM    CKND1 0.2 01/07/2021 03:09 PM    TROIQ 0.03 01/07/2021 03:09 PM     Recent Glucose Results:   Lab Results   Component Value Date/Time     (H) 01/07/2021 03:09 PM     ABG: No results found for: PH, PHI, PCO2, PCO2I, PO2, PO2I, HCO3, HCO3I, FIO2, FIO2I  COAGS:   Lab Results   Component Value Date/Time    APTT 44.6 (H) 01/07/2021 03:09 PM    PTP 16.0 (H) 01/07/2021 03:09 PM    INR 1.3 (H) 01/07/2021 03:09 PM     Liver Panel:   Lab Results   Component Value Date/Time    ALB 3.4 01/07/2021 03:09 PM    TP 8.6 (H) 01/07/2021 03:09 PM    GLOB 5.2 (H) 01/07/2021 03:09 PM    AGRAT 0.7 (L) 01/07/2021 03:09 PM    ALT 29 01/07/2021 03:09 PM    AP 95 01/07/2021 03:09 PM     Pancreatic Markers: No results found for: AMYLPOCT, AML, LIPPOCT, LPSE    Ct Spine Lumb Wo Cont    Result Date: 1/7/2021  Examination: CT lumbar spine without contrast. HISTORY: Patient with increased weakness in bilateral lower extremities, progressive over several days. TECHNIQUE: CT imaging of the lumbar spine was performed in the axial plane utilizing both bone and soft tissue reconstruction algorithms. Standard coronal and sagittal reformatted images were performed by the technologist and are included in interpretation. One or more dose reduction techniques were used on this CT: automated exposure control, adjustment of the mAs and/or kVp according to patient size, and iterative reconstruction techniques. The specific techniques used on this CT exam have been documented in the patient's electronic medical record.   Digital Imaging and Communications in Medicine (DICOM) format image data are available to nonaffiliated external healthcare facilities or entities on a secure, media free, reciprocally searchable basis with patient authorization for at least a 12-month period after this study. COMPARISON: No relevant prior imaging is available for review or comparison. FINDINGS: There is mild dextrorotatory curvature of the thoracolumbar junction demonstrated. Sagittal reformatted imaging demonstrates mild anterolisthesis of L5 on S1. There are postoperative changes from posterior instrumented spinal fusion extending from L2-S1. Interlaminar fixation cages are noted at L2-L3, L3-L4, L4-L5, and L5-S1. Anterior plate and screw construct across the L5-S1 level is present. There are paired pedicular screws present at the posteriorly decompressed/fused levels. Slight medial position of the right L2 pedicle screw noted. L3-L5 pedicle screws appear centered within the respective pedicles. Minor anterior cortical penetration of the right S1 body screw noted. Vertebral body heights appear preserved. No compression fracture demonstrated. Sacrum is intact. Mild bilateral SI joint osteoarthritis. No suspicious appearing lytic or blastic osseous lesion. Secondary to beam hardening artifact incurred from the indwelling spinal instrumentation, contents of the spinal canal are not well assessed by this examination. Prominent disc osteophyte at the L2-L3 level noted with likely ventral impression of thecal sac; however detail is limited. No high-grade neuroforaminal stenosis demonstrated. Review retroperitoneal soft tissue structures demonstrates relatively diffuse aortobiiliac atherosclerotic vascular calcification without evidence of aneurysmal dilatation. No adenopathy or free pelvic fluid. IMPRESSION: 1. Postoperative changes from posterior instrumented spinal fusion, decompression, and interlaminar fixation L2-S1 without acute osseous abnormality demonstrated. 2. Limited assessment of the contents of the spinal canal secondary to extensive artifact from indwelling spinal instrumentation.  No definite findings on this examination to suggest a high-grade osseous canal stenosis with the majority of the lumbar spine posteriorly decompressed. 3. No high-grade neural foraminal stenosis. Xr Chest Port    Result Date: 1/7/2021  EXAM: XR CHEST PORT CLINICAL INDICATION/HISTORY: sepsis   > Additional: None. COMPARISON: June 17, 2014 TECHNIQUE: Portable chest _______________ FINDINGS: SUPPORT DEVICES: Left subclavian approach cardiac pacer device is now seen with leads projecting over the right atrium, right ventricle, and coronary sinus. HEART AND MEDIASTINUM: Normal size and contour. Normal pulmonary vasculature. LUNGS AND PLEURAL SPACES: The lungs are well expanded and clear. No focal consolidation, effusion, or pneumothorax. BONY THORAX AND SOFT TISSUES: Left shoulder arthroplasty hardware is noted with degenerative changes present in both acromioclavicular joints. _______________     IMPRESSION: No active cardiopulmonary disease. Procedures/imaging: see electronic medical records for all procedures/Xrays and details which were not copied into this note but were reviewed prior to creation of Monalisa Marie MD, Internal Medicine     CC:  Gilbert Mendoza MD

## 2021-01-08 NOTE — PROGRESS NOTES
1921: Received report from ED nurse Patsy Nguyễn. 1934: Patient needed x-ray before coming to the floor. X-ray tech needs to know medications -- patient is unaware of medications he takes. Will call wife. 2000:Patient takes Ropinirole 3mg TID. Myrbetriq 25mg 1 Tablet a day. Patient takes omeprazole 20mg for acid reflux. Patient takes Potassium Chloride 20meq. Patient takes spironolactone 25mg once daily. Patient takes rosuvastatin calcium 10mg once a day. Patient takes multivitamin. Patient takes magnesium oxide 400mg tablets. Patient takes entresto 24-26mg tablets 2 times daily. Patient takes allopurinol 100mg by mouth every day. Patient takes Lasix 80mg in the morning and 40mg at night. Patient takes hydrocodone/acetaminophin q8 hours for back pain. 2109: Patient having CT completed. 2122: Patient having XR completed. 2142: Morphine 2mg given for 7/10 back pain. 0012: Morphine 2mg given for 7/10 back pain. 7229: Morphine 2mg given for 7/10 back pain. 1985: Bedside shift change report given to Madeline Santacruz RN (oncoming nurse) by Zara SHERIDAN RN (offgoing nurse). Report included the following information SBAR, Kardex and MAR. Shift Summary: Patient is alert and oriented x4. Remains on room air. Patient is incontinent. Continues to complain of back pain.

## 2021-01-09 ENCOUNTER — APPOINTMENT (OUTPATIENT)
Dept: GENERAL RADIOLOGY | Age: 81
DRG: 565 | End: 2021-01-09
Attending: PSYCHIATRY & NEUROLOGY
Payer: MEDICARE

## 2021-01-09 LAB
ANION GAP SERPL CALC-SCNC: 11 MMOL/L (ref 3–18)
APPEARANCE CSF: CLEAR
BASOPHILS # BLD: 0 K/UL (ref 0–0.1)
BASOPHILS NFR BLD: 0 % (ref 0–2)
BUN SERPL-MCNC: 35 MG/DL (ref 7–18)
BUN/CREAT SERPL: 27 (ref 12–20)
CALCIUM SERPL-MCNC: 8.3 MG/DL (ref 8.5–10.1)
CHLORIDE SERPL-SCNC: 105 MMOL/L (ref 100–111)
CK SERPL-CCNC: 2326 U/L (ref 39–308)
CO2 SERPL-SCNC: 22 MMOL/L (ref 21–32)
COLOR CSF: COLORLESS
CREAT SERPL-MCNC: 1.28 MG/DL (ref 0.6–1.3)
DIFFERENTIAL METHOD BLD: ABNORMAL
EOSINOPHIL # BLD: 0 K/UL (ref 0–0.4)
EOSINOPHIL NFR BLD: 0 % (ref 0–5)
ERYTHROCYTE [DISTWIDTH] IN BLOOD BY AUTOMATED COUNT: 16.1 % (ref 11.6–14.5)
GLUCOSE CSF-MCNC: 58 MG/DL (ref 40–70)
GLUCOSE SERPL-MCNC: 125 MG/DL (ref 74–99)
HCT VFR BLD AUTO: 33.5 % (ref 36–48)
HGB BLD-MCNC: 10.9 G/DL (ref 13–16)
LYMPHOCYTES # BLD: 1.2 K/UL (ref 0.9–3.6)
LYMPHOCYTES NFR BLD: 10 % (ref 21–52)
MAGNESIUM SERPL-MCNC: 2.5 MG/DL (ref 1.6–2.6)
MCH RBC QN AUTO: 29.6 PG (ref 24–34)
MCHC RBC AUTO-ENTMCNC: 32.5 G/DL (ref 31–37)
MCV RBC AUTO: 91 FL (ref 74–97)
MONOCYTES # BLD: 1.2 K/UL (ref 0.05–1.2)
MONOCYTES NFR BLD: 10 % (ref 3–10)
NEUTS SEG # BLD: 9.8 K/UL (ref 1.8–8)
NEUTS SEG NFR BLD: 80 % (ref 40–73)
PLATELET # BLD AUTO: 218 K/UL (ref 135–420)
PMV BLD AUTO: 8.9 FL (ref 9.2–11.8)
POTASSIUM SERPL-SCNC: 4.2 MMOL/L (ref 3.5–5.5)
PROT CSF-MCNC: 91 MG/DL (ref 15–45)
RBC # BLD AUTO: 3.68 M/UL (ref 4.7–5.5)
RBC # CSF: 268 /CU MM
SODIUM SERPL-SCNC: 138 MMOL/L (ref 136–145)
TUBE # CSF: 2
TUBE # CSF: 3
TUBE # CSF: 3
WBC # BLD AUTO: 12.2 K/UL (ref 4.6–13.2)
WBC # CSF: 2 /CU MM

## 2021-01-09 PROCEDURE — 89050 BODY FLUID CELL COUNT: CPT

## 2021-01-09 PROCEDURE — 82945 GLUCOSE OTHER FLUID: CPT

## 2021-01-09 PROCEDURE — 74011250636 HC RX REV CODE- 250/636: Performed by: INTERNAL MEDICINE

## 2021-01-09 PROCEDURE — 74011250636 HC RX REV CODE- 250/636: Performed by: HOSPITALIST

## 2021-01-09 PROCEDURE — 84157 ASSAY OF PROTEIN OTHER: CPT

## 2021-01-09 PROCEDURE — 65270000029 HC RM PRIVATE

## 2021-01-09 PROCEDURE — 009U3ZX DRAINAGE OF SPINAL CANAL, PERCUTANEOUS APPROACH, DIAGNOSTIC: ICD-10-PCS | Performed by: RADIOLOGY

## 2021-01-09 PROCEDURE — 85025 COMPLETE CBC W/AUTO DIFF WBC: CPT

## 2021-01-09 PROCEDURE — 83735 ASSAY OF MAGNESIUM: CPT

## 2021-01-09 PROCEDURE — 74011250637 HC RX REV CODE- 250/637: Performed by: PSYCHIATRY & NEUROLOGY

## 2021-01-09 PROCEDURE — 97162 PT EVAL MOD COMPLEX 30 MIN: CPT

## 2021-01-09 PROCEDURE — 74011250637 HC RX REV CODE- 250/637: Performed by: HOSPITALIST

## 2021-01-09 PROCEDURE — 82550 ASSAY OF CK (CPK): CPT

## 2021-01-09 PROCEDURE — 74011250637 HC RX REV CODE- 250/637: Performed by: ORTHOPAEDIC SURGERY

## 2021-01-09 PROCEDURE — 87205 SMEAR GRAM STAIN: CPT

## 2021-01-09 PROCEDURE — 80048 BASIC METABOLIC PNL TOTAL CA: CPT

## 2021-01-09 PROCEDURE — 74011000250 HC RX REV CODE- 250: Performed by: HOSPITALIST

## 2021-01-09 PROCEDURE — 77003 FLUOROGUIDE FOR SPINE INJECT: CPT

## 2021-01-09 PROCEDURE — 36415 COLL VENOUS BLD VENIPUNCTURE: CPT

## 2021-01-09 PROCEDURE — 87015 SPECIMEN INFECT AGNT CONCNTJ: CPT

## 2021-01-09 PROCEDURE — 97530 THERAPEUTIC ACTIVITIES: CPT

## 2021-01-09 RX ORDER — LIDOCAINE HYDROCHLORIDE 10 MG/ML
INJECTION, SOLUTION EPIDURAL; INFILTRATION; INTRACAUDAL; PERINEURAL
Status: COMPLETED
Start: 2021-01-09 | End: 2021-01-09

## 2021-01-09 RX ORDER — BACLOFEN 10 MG/1
10 TABLET ORAL 3 TIMES DAILY
Status: DISCONTINUED | OUTPATIENT
Start: 2021-01-09 | End: 2021-01-09

## 2021-01-09 RX ORDER — SODIUM CHLORIDE 9 MG/ML
75 INJECTION, SOLUTION INTRAVENOUS CONTINUOUS
Status: DISPENSED | OUTPATIENT
Start: 2021-01-09 | End: 2021-01-10

## 2021-01-09 RX ORDER — BACLOFEN 10 MG/1
5 TABLET ORAL 3 TIMES DAILY
Status: DISCONTINUED | OUTPATIENT
Start: 2021-01-09 | End: 2021-01-14 | Stop reason: HOSPADM

## 2021-01-09 RX ORDER — LIDOCAINE HYDROCHLORIDE 10 MG/ML
10 INJECTION, SOLUTION EPIDURAL; INFILTRATION; INTRACAUDAL; PERINEURAL
Status: COMPLETED | OUTPATIENT
Start: 2021-01-09 | End: 2021-01-09

## 2021-01-09 RX ADMIN — LIDOCAINE HYDROCHLORIDE 5 ML: 10 INJECTION, SOLUTION EPIDURAL; INFILTRATION; INTRACAUDAL; PERINEURAL at 15:30

## 2021-01-09 RX ADMIN — BACLOFEN 5 MG: 10 TABLET ORAL at 21:24

## 2021-01-09 RX ADMIN — HEPARIN SODIUM 5000 UNITS: 5000 INJECTION INTRAVENOUS; SUBCUTANEOUS at 03:34

## 2021-01-09 RX ADMIN — DIAZEPAM 2.5 MG: 5 TABLET ORAL at 10:30

## 2021-01-09 RX ADMIN — DOCUSATE SODIUM 100 MG: 100 CAPSULE, LIQUID FILLED ORAL at 08:59

## 2021-01-09 RX ADMIN — ACETAMINOPHEN 650 MG: 325 TABLET ORAL at 21:38

## 2021-01-09 RX ADMIN — HEPARIN SODIUM 5000 UNITS: 5000 INJECTION INTRAVENOUS; SUBCUTANEOUS at 08:59

## 2021-01-09 RX ADMIN — CEFTRIAXONE 1 G: 1 INJECTION, POWDER, FOR SOLUTION INTRAMUSCULAR; INTRAVENOUS at 16:16

## 2021-01-09 RX ADMIN — MORPHINE SULFATE 2 MG: 2 INJECTION, SOLUTION INTRAMUSCULAR; INTRAVENOUS at 17:09

## 2021-01-09 RX ADMIN — DIAZEPAM 2.5 MG: 5 TABLET ORAL at 03:34

## 2021-01-09 RX ADMIN — BACLOFEN 5 MG: 10 TABLET ORAL at 16:13

## 2021-01-09 RX ADMIN — SODIUM CHLORIDE 75 ML/HR: 900 INJECTION, SOLUTION INTRAVENOUS at 09:04

## 2021-01-09 NOTE — CONSULTS
NEUROLOGY CONSULTATION NOTE    Patient: Augustine Charles MRN: [id number]  CSN: [id number]    YOB: 1940  Age: 68 y.o. Sex: male    DOA: 1/7/2021 LOS:  LOS: 2 days        Requesting Physician: Dr. Tamanna Shah  Reason for Consultation: Bilateral foot weakness              HISTORY OF PRESENT ILLNESS:   Augustine Charles is a 68 y.o. male with past medical history of chronic spinal stenosis, heart failure who I have been asked to see for bilateral lower limb weakness. Patient related that he has bilateral lower limb weakness, left worse than right leg in the past 3 days. According to records, he fell from 13 stairs and landed on his stomach. Patient complains of severe pain involving bilateral knees, ankles and toes. He has frequent muscle spasm. Spinal myelogram no spinal canal stenosis. He had L2 vertebral body fracture. Orthopedic surgeon saw the patient, no surgical candidate. Head CT was unremarkable except brain atrophy. 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 [address] HX SHOULDER REPLACEMENT  2009    HX TONSILLECTOMY      [place] CARDIAC SURG PROCEDURE UNLIST  2009    stent placement     FAMILY HISTORY:  History reviewed. No pertinent family history.   SOCIAL HISTORY:  Social History     Socioeconomic History    Marital status:      Spouse name: Not on file    Number of children: Not on file    Years of education: Not on file    Highest education level: Not on file   Tobacco Use    Smoking status: Former Smoker     Quit date: 1969     Years since quittin.9    Smokeless tobacco: Never Used   Substance and Sexual Activity    Alcohol use: Not Currently    Drug use: No     MEDICATIONS:  Current Facility-Administered Medications   Medication Dose Route Frequency    0.9% sodium chloride infusion  75 mL/hr IntraVENous CONTINUOUS    diazePAM (VALIUM) tablet 2.5 mg  2.5 mg Oral Q6H PRN    0.9% sodium chloride infusion  50 mL/hr IntraVENous CONTINUOUS    sodium chloride (NS) flush 5-40 mL  5-40 mL IntraVENous Q8H    sodium chloride (NS) flush 5-40 mL  5-40 mL IntraVENous PRN    acetaminophen (TYLENOL) tablet 650 mg  650 mg Oral Q6H PRN    Or    acetaminophen (TYLENOL) suppository 650 mg  650 mg Rectal Q6H PRN    bisacodyL (DULCOLAX) suppository 10 mg  10 mg Rectal DAILY PRN    promethazine (PHENERGAN) tablet 12.5 mg  12.5 mg Oral Q6H PRN    Or    ondansetron (ZOFRAN) injection 4 mg  4 mg IntraVENous Q6H PRN    cefTRIAXone (ROCEPHIN) 1 g in sterile water (preservative free) 10 mL IV syringe  1 g IntraVENous Q24H    heparin (porcine) injection 5,000 Units  5,000 Units SubCUTAneous Q8H    morphine injection 1-2 mg  1-2 mg IntraVENous Q4H PRN    docusate sodium (COLACE) capsule 100 mg  100 mg Oral DAILY     Prior to Admission medications    Medication Sig Start Date End Date Taking? Authorizing Provider   rOPINIRole (REQUIP) 3 mg tab tab Take 3 mg by mouth three (3) times daily. Indications: restless legs syndrome, an extreme discomfort in the calf muscles when sitting or lying down   Yes Provider, Historical   potassium chloride (K-DUR, KLOR-CON) 20 mEq tablet Take 20 mEq by mouth daily. Indications: prevention of low potassium in the blood   Yes Provider, Historical   furosemide (Lasix) 40 mg tablet Take 80 mg by mouth daily.  80 mg in the morning and one tablet (40mg) every evening. Yes Provider, Historical   magnesium oxide (MAG-OX) 400 mg tablet Take 400 mg by mouth daily. Yes Provider, Historical   omeprazole (PRILOSEC) 20 mg capsule Take 20 mg by mouth daily. Yes Provider, Historical   spironolactone (ALDACTONE) 25 mg tablet Take 25 mg by mouth daily. Yes Provider, Historical   valsartan (DIOVAN) 40 mg tablet Take 40 mg by mouth two (2) times a day. Patient takes medication entresto 24-26mg TID. Yes Provider, Historical   HYDROcodone-acetaminophen (NORCO) 5-325 mg per tablet Take 1 Tab by mouth every four (4) hours as needed for Pain. Max Daily Amount: 6 Tabs. 1/2/19  Yes Otto Lazar PA-C   rosuvastatin (CRESTOR) 10 mg tablet Take 10 mg by mouth nightly. Yes Provider, Historical   multivitamin (ONE A DAY) tablet Take 1 Tab by mouth daily. Yes Provider, Historical   predniSONE (STERAPRED DS) 10 mg dose pack Take with food. 1/2/19   Mayito Gomez PA-C   aspirin (ASPIRIN) 325 mg tablet Take 325 mg by mouth daily. Other, MD Sandra   fish oil-omega-3 fatty acids 340-1,000 mg capsule Take 1 Cap by mouth daily. Provider, Historical   valsartan-hydrochlorothiazide (DIOVAN HCT) 160-25 mg per tablet Take 1 Tab by mouth daily. Provider, Historical       ALLERGIES:  Allergies   Allergen Reactions    Percocet [Oxycodone-Acetaminophen] Nausea and Vomiting       Review of Systems  GENERAL: No fevers or chills. HEENT: No change in vision, earache, tinnitus, sore throat or sinus congestion. NECK: No pain or stiffness. CARDIOVASCULAR: No chest pain or pressure. No palpitations. PULMONARY: No shortness of breath, cough or wheeze. GASTROINTESTINAL: No abdominal pain, nausea, vomiting or diarrhea. GENITOURINARY: No urinary frequency, urgency, hesitancy or dysuria. MUSCULOSKELETAL: No joint or muscle pain, no back pain, no recent trauma. DERMATOLOGIC: No rash, no itching, no lesions. ENDOCRINE: No polyuria, polydipsia, no heat or cold intolerance.  No recent change in weight. HEMATOLOGICAL: No anemia or easy bruising or bleeding. NEUROLOGIC: Bilateral leg spasm. PHYSICAL EXAMINATION:     Visit Vitals  /63 (BP 1 Location: Left arm, BP Patient Position: At rest)   Pulse 91   Temp 98.7 °F (37.1 °C)   Resp 17   Ht 5' 10\" (1.778 m)   Wt 94.3 kg (208 lb)   SpO2 98%   BMI 29.84 kg/m²      O2 Device: Room air  GENERAL: Pleasant, in no apparent distress. HEENT: Moist mucous membranes, sclerae anicteric, scalp is atraumatic. CVS: Regular rate and rhythm, no murmurs or gallops. No carotid bruits. PULMONARY: Clear to auscultation bilaterally. No rales or rhonchi. No wheezing. EXTREMITIES: Normal range of motion at all sites. No deformities. ABDOMEN: Soft, nontender. SKIN: No rashes or ecchymoses. Warm and dry. NEUROLOGIC: Alert and oriented x3. Speech is fluent without any aphasia or dysarthria. Cranial nerves: Left facial weakness. Facial sensation is intact. Extraocular movements are intact with no nystagmus. Visual fields are full to confrontation. PERRL. Tongue is midline. Palate elevates symmetrically. Hearing intact to speech. Sternocleidomastoid and trapezius strengths are full bilaterally. Motor: Normal tone and normal bulk on bilateral upper limbs.  4 -/5 bilateral proximal and distal lower limbs. Assessment is limited by severe pain. Sensory: Grossly normal to light touch. Coordination: Intact coordination with finger-nose-finger bilaterally. Deep tendon reflexes: 1+ at biceps, brachioradialis, absent  patella and ankles bilaterally. Toes and foot are swollen. Gait assessment: Deferred.     Labs: Results:       Chemistry Recent Labs     01/09/21  0205 01/08/21  0120 01/07/21  1509   * 125* 149*    139 138   K 4.2 4.3 4.3    103 100   CO2 22 24 25   BUN 35* 38* 35*   CREA 1.28 1.55* 1.69*   CA 8.3* 8.8 9.6   AGAP 11 12 13   BUCR 27* 25* 21*   AP  --   --  95   TP  --   --  8.6*   ALB  --   --  3.4   GLOB  --   -- 5.2*   AGRAT  --   --  0.7*      CBC w/Diff Recent Labs     01/09/21  0205 01/08/21  0120 01/07/21  1509   WBC 12.2 13.7* 22.1*   RBC 3.68* 3.94* 4.57*   HGB 10.9* 11.7* 13.8   HCT 33.5* 36.0 41.9    242 296   GRANS 80* 84* 88*   LYMPH 10* 8* 5*   EOS 0 0 0      Cardiac Enzymes Recent Labs     01/09/21  0205 01/08/21  0120 01/07/21  1509   CPK 2,326* 1,317* 2,090*   CKND1  --   --  0.2      Coagulation Recent Labs     01/07/21  1509   PTP 16.0*   INR 1.3*   APTT 44.6*       Lipid Panel No results found for: CHOL, CHOLPOCT, CHOLX, CHLST, CHOLV, 948535, HDL, HDLP, LDL, LDLC, DLDLP, 772323, VLDLC, VLDL, TGLX, TRIGL, TRIGP, TGLPOCT, CHHD, CHHDX   BNP No results for input(s): BNPP in the last 72 hours. Liver Enzymes Recent Labs     01/07/21  1509   TP 8.6*   ALB 3.4   AP 95      Thyroid Studies No results found for: T4, T3U, TSH, TSHEXT       Radiology:  Xr Myelo Lumbar    Result Date: 1/7/2021  EXAM: LUMBAR SPINE MYELOGRAM AND POSTMYELOGRAM LUMBAR SPINE CT INDICATION: Bilateral lower extremity weakness, severe low back pain, prior lumbar fusion, possible cauda equina syndrome, cardiac pacing device unable to undergo MRI COMPARISON: Noncontrast CT lumbar spine earlier the same day TECHNIQUE: Written informed consent was obtained from the patient after thorough explanation of the procedure, risks, and benefits. The patient was placed prone upon the fluoroscopy table. The patient's back was prepped and draped in the normal sterile fashion. 1% lidocaine buffered with sodium bicarbonate was used for local anesthetic. Next using fluoroscopic guidance, a 22 gauge three and half-inch spinal needle was advanced into the thecal sac at the L3 level through laminectomy defect. Immediate CSF return was seen. Then approximately 15 cc of Isovue 200-M contrast was slowly instilled into the thecal sac under intermittent fluoroscopic observation. The stylet was replaced. The needle was removed. Hemostasis was achieved. There were no immediate complications. Multiple routine digital fluoroscopic images were then obtained. Patient was unable to tolerate upright imaging due to leg weakness. The patient was sent to the CT suite for a postmyelogram lumbar spine CT. Multiple axial CT images of the lumbar spine were performed. Sagittal and coronal reconstructions were also performed. One or more dose reduction techniques were used on this CT: automated exposure control, adjustment of the mAs and/or kVp according to patient's size, and iterative reconstruction techniques. The specific techniques utilized on this CT exam have been documented in the patient's electronic medical record. Digital Imaging and Communications in Medicine (DICOM) format image data are available to nonaffiliated external healthcare facilities or entities on a secure, media free, reciprocally searchable basis with patient authorization for at least a 12-month period after this study. Fluoroscopy radiation dose (reference air kerma), in mGy: 56.50 GUIDANCE: Fluoroscopic guidance was used to position (and confirm the position of) the spinal needle. Image(s) saved in PACS: Fluoroscopy ____________________ FINDINGS: LUMBAR MYELOGRAM The patient has previously undergone spinal fusion of the L2-S1 levels with bipedicular screws and fusion rods. Accompanying laminectomy changes are present at L2-L5 levels. Additional interbody graft fusion has been performed of each of the disc levels. The L5-S1 interbody graft is from an anterior approach with interlocking screws. Slight L1-L2 retrolisthesis is present. Of note, upright imaging not able to be obtained. Patient had limited mobility on the table limiting overall myelographic evaluation. No high-grade central stenosis is seen. No hardware failure is seen. Lucency is associated with the right L2 pedicle screw exuberant paraspinal calcification/osteophyte is present at the L1-L2 junctional level.  Please refer to the post-myelogram CT findings below for further details. POSTMYELOGRAM LUMBAR SPINE CT Post surgical changes from L2-S1 spinal fusion are again with bipedicular screws and interlocking fusion rods. Additional interbody graft fusion is present at each of the 4 disc levels with anterior approach for the L5-S1 interbody graft. Accompanying laminectomy changes are seen. Slight L1-L2 retrolisthesis is present. There is abnormal hypodensity associated with the superior lateral aspect of the L2 vertebral body with a somewhat oblique configuration. Slight depression of the superior endplate is present. This hypodensity represents a fracture line and extends to the superior margin of the pedicle screw and further posteriorly through the right pedicle through the osseous graft fusion material. Adjacent hypodensity is present partially surrounding this right L2 pedicle screw. There may be an additional incomplete fracture line through the inferior aspect of the L1 spinous process. There is prominent curvilinear calcification representing callus formation and/or developing osteophyte along the lateral margins of this L1-2 disc space. No solid bridging bone is present. Other vertebral body heights are preserved. The conus medullaris is located at the L1-L2 level and has a normal appearance. No distal cord or conus impingement is seen. T12-L1 level: There is no central or foraminal stenosis. L1/2 level: Disc bulge is present slightly uncovered by the retrolisthesis. No central stenosis is present. Mild foraminal stenosis is present. L2/3 level: This level is a prior surgical fusion level. Thickened calcification is present along the posterior margin of the disc, perhaps representing ossification of posterior longitudinal ligament or osseous graft fusion material, unchanged. Minimal foraminal narrowing is present. No central stenosis is present. L3/4 level: This level is a prior surgical fusion level.  There is no central or foraminal stenosis. L4/5 level: This level is a prior surgical fusion level. There is no central or foraminal stenosis. L5/S1 level: This level is a prior surgical fusion level. Hypertrophic changes produce moderate bilateral foraminal stenosis. No central stenosis is present. Partially visualized prostate is enlarged and impresses upon the base of the urinary bladder. There is abnormal bladder wall thickening present. Small hypodense outpouchings are seen laterally suggesting bladder diverticula. Atherosclerotic calcifications are present. ____________________     IMPRESSION: 1. Postsurgical changes from prior L2-S1 instrument fusion with bipedicular screws and interlocking fusion rods as well as interbody graft fusion placement at each of the fusion levels including anterior approach L5-S1 grafts with interlocking screws. Accompanying laminectomies are seen. -No central stenosis at the postsurgical levels. No impingement upon the distal cord/conus. 2. Fracture involving the superior lateral aspect of the L2 vertebral body extending along the right L2 screw through the right pedicle. Fracture age is strictly indeterminate but suspect subacute. Prominent paraspinal callus formation and developing osteophyte. 3. Slight L1-L2 retrolisthesis without short-term interval change. Patient unable to tolerate upright or flexion/extension positioning for further evaluation. 4. No additional fracture is seen. 5. No high-grade central or foraminal stenosis. Additional mild multilevel degenerative changes throughout the lumbar spine , as described in detail in Findings section. 6. Abnormal bladder wall thickening which may reflect bladder outlet obstruction and/or cystitis. Associated small bilateral bladder diverticula. Ct Head Wo Cont    Result Date: 1/9/2021  EXAM: CT head INDICATION: Lower extremity weakness. COMPARISON: 10/12/2018.  TECHNIQUE: Axial CT imaging of the head was performed without intravenous contrast. One or more dose reduction techniques were used on this CT: automated exposure control, adjustment of the mAs and/or kVp according to patient size, and iterative reconstruction techniques. The specific techniques used on this CT exam have been documented in the patient's electronic medical record. Digital Imaging and Communications in Medicine (DICOM) format image data are available to nonaffiliated external healthcare facilities or entities on a secure, media free, reciprocally searchable basis with patient authorization for at least a 12-month period after this study. _______________ FINDINGS: BRAIN:   There is some prominence of sulci consistent with atrophy most prominent in the perisylvian region. The atrophy has probably progressed in the interval. Ventricular system is mildly prominent also larger in the interval. A single sulcus is prominent in the high right parietal region which could represent an arachnoid cyst in part, unchanged. The brainstem particularly the midbrain is substantially atrophic with a small kate and midbrain. Cerebellum is not particularly atrophic. There is no intracranial hemorrhage, mass effect, or midline shift. No definite new focal brain lesion. No hypodensity in cortex would be consistent with an acute cortical infarction. EXTRA-AXIAL SPACES AND MENINGES: There are no abnormal extra-axial fluid collections or mass. CALVARIUM: Intact. OTHER: Bilateral cataract surgery. _______________     IMPRESSION: 1No acute intracranial abnormalities. No hemorrhage, mass effect or CT evident acute cortical infarction. 2. Supratentorial atrophy as described. Progression of supratentorial loss of brain volume. 3. Substantial brainstem atrophy, as noted previously. No step definite CT correlate to the bilateral middle cerebellar peduncle abnormality noted on previous FLAIR sequence. Cerebellum is not as atrophic as the kate.     Ct Spine Lumb Wo Cont    Result Date: 1/7/2021  Examination: CT lumbar spine without contrast. HISTORY: Patient with increased weakness in bilateral lower extremities, progressive over several days. TECHNIQUE: CT imaging of the lumbar spine was performed in the axial plane utilizing both bone and soft tissue reconstruction algorithms. Standard coronal and sagittal reformatted images were performed by the technologist and are included in interpretation. One or more dose reduction techniques were used on this CT: automated exposure control, adjustment of the mAs and/or kVp according to patient size, and iterative reconstruction techniques. The specific techniques used on this CT exam have been documented in the patient's electronic medical record. Digital Imaging and Communications in Medicine (DICOM) format image data are available to nonaffiliated external healthcare facilities or entities on a secure, media free, reciprocally searchable basis with patient authorization for at least a 12-month period after this study. COMPARISON: No relevant prior imaging is available for review or comparison. FINDINGS: There is mild dextrorotatory curvature of the thoracolumbar junction demonstrated. Sagittal reformatted imaging demonstrates mild anterolisthesis of L5 on S1. There are postoperative changes from posterior instrumented spinal fusion extending from L2-S1. Interlaminar fixation cages are noted at L2-L3, L3-L4, L4-L5, and L5-S1. Anterior plate and screw construct across the L5-S1 level is present. There are paired pedicular screws present at the posteriorly decompressed/fused levels. Slight medial position of the right L2 pedicle screw noted. L3-L5 pedicle screws appear centered within the respective pedicles. Minor anterior cortical penetration of the right S1 body screw noted. Vertebral body heights appear preserved. No compression fracture demonstrated. Sacrum is intact. Mild bilateral SI joint osteoarthritis. No suspicious appearing lytic or blastic osseous lesion.  Secondary to beam hardening artifact incurred from the indwelling spinal instrumentation, contents of the spinal canal are not well assessed by this examination. Prominent disc osteophyte at the L2-L3 level noted with likely ventral impression of thecal sac; however detail is limited. No high-grade neuroforaminal stenosis demonstrated. Review retroperitoneal soft tissue structures demonstrates relatively diffuse aortobiiliac atherosclerotic vascular calcification without evidence of aneurysmal dilatation. No adenopathy or free pelvic fluid. IMPRESSION: 1. Postoperative changes from posterior instrumented spinal fusion, decompression, and interlaminar fixation L2-S1 without acute osseous abnormality demonstrated. 2. Limited assessment of the contents of the spinal canal secondary to extensive artifact from indwelling spinal instrumentation. No definite findings on this examination to suggest a high-grade osseous canal stenosis with the majority of the lumbar spine posteriorly decompressed. 3. No high-grade neural foraminal stenosis. Ct Spine Lumb W Cont    Result Date: 1/7/2021  EXAM: LUMBAR SPINE MYELOGRAM AND POSTMYELOGRAM LUMBAR SPINE CT INDICATION: Bilateral lower extremity weakness, severe low back pain, prior lumbar fusion, possible cauda equina syndrome, cardiac pacing device unable to undergo MRI COMPARISON: Noncontrast CT lumbar spine earlier the same day TECHNIQUE: Written informed consent was obtained from the patient after thorough explanation of the procedure, risks, and benefits. The patient was placed prone upon the fluoroscopy table. The patient's back was prepped and draped in the normal sterile fashion. 1% lidocaine buffered with sodium bicarbonate was used for local anesthetic. Next using fluoroscopic guidance, a 22 gauge three and half-inch spinal needle was advanced into the thecal sac at the L3 level through laminectomy defect. Immediate CSF return was seen.   Then approximately 15 cc of Isovue 200-M contrast was slowly instilled into the thecal sac under intermittent fluoroscopic observation. The stylet was replaced. The needle was removed. Hemostasis was achieved. There were no immediate complications. Multiple routine digital fluoroscopic images were then obtained. Patient was unable to tolerate upright imaging due to leg weakness. The patient was sent to the CT suite for a postmyelogram lumbar spine CT. Multiple axial CT images of the lumbar spine were performed. Sagittal and coronal reconstructions were also performed. One or more dose reduction techniques were used on this CT: automated exposure control, adjustment of the mAs and/or kVp according to patient's size, and iterative reconstruction techniques. The specific techniques utilized on this CT exam have been documented in the patient's electronic medical record. Digital Imaging and Communications in Medicine (DICOM) format image data are available to nonaffiliated external healthcare facilities or entities on a secure, media free, reciprocally searchable basis with patient authorization for at least a 12-month period after this study. Fluoroscopy radiation dose (reference air kerma), in mGy: 56.50 GUIDANCE: Fluoroscopic guidance was used to position (and confirm the position of) the spinal needle. Image(s) saved in PACS: Fluoroscopy ____________________ FINDINGS: LUMBAR MYELOGRAM The patient has previously undergone spinal fusion of the L2-S1 levels with bipedicular screws and fusion rods. Accompanying laminectomy changes are present at L2-L5 levels. Additional interbody graft fusion has been performed of each of the disc levels. The L5-S1 interbody graft is from an anterior approach with interlocking screws. Slight L1-L2 retrolisthesis is present. Of note, upright imaging not able to be obtained. Patient had limited mobility on the table limiting overall myelographic evaluation. No high-grade central stenosis is seen.  No hardware failure is seen. Harpreet Ann is associated with the right L2 pedicle screw exuberant paraspinal calcification/osteophyte is present at the L1-L2 junctional level. Please refer to the post-myelogram CT findings below for further details. POSTMYELOGRAM LUMBAR SPINE CT Post surgical changes from L2-S1 spinal fusion are again with bipedicular screws and interlocking fusion rods. Additional interbody graft fusion is present at each of the 4 disc levels with anterior approach for the L5-S1 interbody graft. Accompanying laminectomy changes are seen. Slight L1-L2 retrolisthesis is present. There is abnormal hypodensity associated with the superior lateral aspect of the L2 vertebral body with a somewhat oblique configuration. Slight depression of the superior endplate is present. This hypodensity represents a fracture line and extends to the superior margin of the pedicle screw and further posteriorly through the right pedicle through the osseous graft fusion material. Adjacent hypodensity is present partially surrounding this right L2 pedicle screw. There may be an additional incomplete fracture line through the inferior aspect of the L1 spinous process. There is prominent curvilinear calcification representing callus formation and/or developing osteophyte along the lateral margins of this L1-2 disc space. No solid bridging bone is present. Other vertebral body heights are preserved. The conus medullaris is located at the L1-L2 level and has a normal appearance. No distal cord or conus impingement is seen. T12-L1 level: There is no central or foraminal stenosis. L1/2 level: Disc bulge is present slightly uncovered by the retrolisthesis. No central stenosis is present. Mild foraminal stenosis is present. L2/3 level: This level is a prior surgical fusion level.  Thickened calcification is present along the posterior margin of the disc, perhaps representing ossification of posterior longitudinal ligament or osseous graft fusion material, unchanged. Minimal foraminal narrowing is present. No central stenosis is present. L3/4 level: This level is a prior surgical fusion level. There is no central or foraminal stenosis. L4/5 level: This level is a prior surgical fusion level. There is no central or foraminal stenosis. L5/S1 level: This level is a prior surgical fusion level. Hypertrophic changes produce moderate bilateral foraminal stenosis. No central stenosis is present. Partially visualized prostate is enlarged and impresses upon the base of the urinary bladder. There is abnormal bladder wall thickening present. Small hypodense outpouchings are seen laterally suggesting bladder diverticula. Atherosclerotic calcifications are present. ____________________     IMPRESSION: 1. Postsurgical changes from prior L2-S1 instrument fusion with bipedicular screws and interlocking fusion rods as well as interbody graft fusion placement at each of the fusion levels including anterior approach L5-S1 grafts with interlocking screws. Accompanying laminectomies are seen. -No central stenosis at the postsurgical levels. No impingement upon the distal cord/conus. 2. Fracture involving the superior lateral aspect of the L2 vertebral body extending along the right L2 screw through the right pedicle. Fracture age is strictly indeterminate but suspect subacute. Prominent paraspinal callus formation and developing osteophyte. 3. Slight L1-L2 retrolisthesis without short-term interval change. Patient unable to tolerate upright or flexion/extension positioning for further evaluation. 4. No additional fracture is seen. 5. No high-grade central or foraminal stenosis. Additional mild multilevel degenerative changes throughout the lumbar spine , as described in detail in Findings section. 6. Abnormal bladder wall thickening which may reflect bladder outlet obstruction and/or cystitis. Associated small bilateral bladder diverticula.     Xr Chest Port    Result Date: 1/7/2021  EXAM: XR CHEST PORT CLINICAL INDICATION/HISTORY: sepsis   > Additional: None. COMPARISON: June 17, 2014 TECHNIQUE: Portable chest _______________ FINDINGS: SUPPORT DEVICES: Left subclavian approach cardiac pacer device is now seen with leads projecting over the right atrium, right ventricle, and coronary sinus. HEART AND MEDIASTINUM: Normal size and contour. Normal pulmonary vasculature. LUNGS AND PLEURAL SPACES: The lungs are well expanded and clear. No focal consolidation, effusion, or pneumothorax. BONY THORAX AND SOFT TISSUES: Left shoulder arthroplasty hardware is noted with degenerative changes present in both acromioclavicular joints. _______________     IMPRESSION: No active cardiopulmonary disease. ASSESSMENT/IMPRESSION:  68-year-old male with past medical history of chronic spinal stenosis status post surgery presents with 2-day history of bilateral foot pain and weakness. Lumbar spine myelogram was finished and ruled out spinal stenosis except but vertebral body fracture. 1. Bilateral lower limb weakness: It is very hard to do neurologic exam due to current severe pain. Therefore differential diagnosis is broad including GBS, chronic peripheral neuropathy, stroke with left facial weakness, musculoskeletal pain from arthritis and recent vertebral body fracture. RECOMMENDATIONS  1.  CSF study to rule out Guillain-Barré syndrome. 2.  Baclofen 5 mg nightly for muscle spasm. I will follow the patient.  Please do not hesitate to return with any questions.    ------------------------------------  Shay Banegas MD  1/9/2021  12:18 PM

## 2021-01-09 NOTE — PROGRESS NOTES
1940: Bedside report received from Anamaria Franks RN. Assumed care of pt at this time. Pt in bed with no signs of distress. Pt left with call light within reach and encouraged to call for assistance. 2213: Morphine 2mg given for back pain 7/10.     0334: Valium 2.5mg given for feet spasms. 0340: Bedside shift change report given to Marbella Burks RN (oncoming nurse) by Diana Garcia (offgoing nurse). Report included the following information SBAR, Kardex and MAR. Shift Summary: Patient complains of spasms to his feet -- right and left foot have edema +2. Patient has scabbing to legs from previous falls at home. Patient is voiding without issue but incontinent. Alert and oriented x4 however does have periods of confusion. Complains of high levels of pain to his back. Remains bedrest. On Tele Box#[phone] -- paced rhythm.

## 2021-01-09 NOTE — PROCEDURES
Vascular & Interventional Radiology Brief Procedure Note    Interventional Radiologist: Jeff Ramachandran    Pre-operative Diagnosis:  B LE weakness    Post-operative Diagnosis: Same as pre-op dx    Procedure(s) Performed:  Image guided LP    Anesthesia:  Local only    Findings:  L2-S1 hardware    Complications: None    Estimated Blood Loss:  minimal    Tubes and Drains: None    Specimens: 12 CC clear colorless CSF    Condition: Good    Disposition:  inpatient    Plan: specimen to lab for analysis.        Georgia Fisher MD  [facility]  Vascular & Interventional Radiology  1/9/2021

## 2021-01-09 NOTE — PROGRESS NOTES
Hospitalist Progress Note-critical care note     Patient: Sharee Eye MRN: [id number]  CSN: [id number]    YOB: 1940  Age: 68 y.o. Sex: male    DOA: 1/7/2021 LOS:  LOS: 2 days            Chief complaint: rhabdo , uti , elevated cr, bilateral weakness     Assessment/Plan         Hospital Problems  Date Reviewed: 7/7/2014          Codes Class Noted POA    Traumatic rhabdomyolysis ([facility] [address] ICD-10-CM: T79. 6XXA  ICD-9-CM: 958.6  1/8/2021 Unknown        UTI (urinary tract infection) ICD-10-CM: N39.0  ICD-9-CM: 599.0  1/7/2021 Unknown        * (Principal) Weakness of both legs ICD-10-CM: R29.898  ICD-9-CM: 729.89  1/7/2021 Unknown        Status cardiac pacemaker ICD-10-CM: Z95.0  ICD-9-CM: V45.01  1/7/2021 Unknown        Elevated serum creatinine ICD-10-CM: R79.89  ICD-9-CM: 790.99  1/7/2021 Unknown        Fall at home ICD-10-CM: [address] [place], [place]  ICD-9-CM: E888.9, E849.0  1/7/2021 Unknown        HTN (hypertension) ICD-10-CM: I10  ICD-9-CM: 401.9  4/16/2013 Yes              Weakness    myelo gram per IR done, no high stenosis , L2 fracture -not sure age    appreciate ortho input not surgicall  Will obtain neuro consult    Rhabd   Due to recent fall   Continue iv hydration   Ck monitoring     Elevated cr -mild improving   Ns infusion   Continue monitoring cr     Cad so far stable      Pacemaker   Will remain electrolytes balance   No MRI due to this veriifed with MRI        Fall at home   Fall precaution   PT eval       HTN, accelerated  Hold meds for now due to border line bp   Restart as needed     Subjective: feel fine , pain is controlled , I had L2 fracture before   Rn: try to reach ortho      Disposition :tbd, pending PT and Neuro consult  Review of systems:    General: No fevers or chills. Cardiovascular: No chest pain or pressure. No palpitations. Pulmonary: No shortness of breath. Gastrointestinal: No nausea, vomiting.    Msk: leg weakness     Vital signs/Intake and Output:  Visit Vitals  /65 (BP 1 Location: Left arm, BP Patient Position: At rest)   Pulse 95   Temp 98.1 °F (36.7 °C)   Resp 17   Ht 5' 10\" (1.778 m)   Wt 94.3 kg (208 lb)   SpO2 97%   BMI 29.84 kg/m²     Current Shift:  No intake/output data recorded. Last three shifts:  01/07 1901 - 01/09 0700  In: 720 [P.O.:720]  Out: 0     Physical Exam:  General: WD, WN. Alert, cooperative, no acute distress    HEENT: NC, Atraumatic. PERRLA, anicteric sclerae. Lungs: CTA Bilaterally. No Wheezing/Rhonchi/Rales. Heart:  Regular  rhythm,  No murmur, No Rubs, No Gallops  Abdomen: Soft, Non distended, Non tender. +Bowel sounds,   Extremities: No c/c/e  Psych:   Not anxious or agitated. Neurologic:  No acute neurological deficit except bilateral LE weakness ,sensation same           Labs: Results:       Chemistry Recent Labs     01/09/21 0205 01/08/21 0120 01/07/21  1509   * 125* 149*    139 138   K 4.2 4.3 4.3    103 100   CO2 22 24 25   BUN 35* 38* 35*   CREA 1.28 1.55* 1.69*   CA 8.3* 8.8 9.6   AGAP 11 12 13   BUCR 27* 25* 21*   AP  --   --  95   TP  --   --  8.6*   ALB  --   --  3.4   GLOB  --   --  5.2*   AGRAT  --   --  0.7*      CBC w/Diff Recent Labs     01/09/21 0205 01/08/21  0120 01/07/21  1509   WBC 12.2 13.7* 22.1*   RBC 3.68* 3.94* 4.57*   HGB 10.9* 11.7* 13.8   HCT 33.5* 36.0 41.9    242 296   GRANS 80* 84* 88*   LYMPH 10* 8* 5*   EOS 0 0 0      Cardiac Enzymes Recent Labs     01/09/21 0205 01/08/21  0120 01/07/21  1509   CPK 2,326* 1,317* 2,090*   CKND1  --   --  0.2      Coagulation Recent Labs     01/07/21  1509   PTP 16.0*   INR 1.3*   APTT 44.6*       Lipid Panel No results found for: CHOL, CHOLPOCT, CHOLX, CHLST, CHOLV, 548505, HDL, HDLP, LDL, LDLC, DLDLP, 256849, VLDLC, VLDL, TGLX, TRIGL, TRIGP, TGLPOCT, CHHD, CHHDX   BNP No results for input(s): BNPP in the last 72 hours.    Liver Enzymes Recent Labs     01/07/21  1509   TP 8.6*   ALB 3.4   AP 95      Thyroid Studies No results found for: T4, T3U, TSH, TSHEXT, TSHEXT     Procedures/imaging: see electronic medical records for all procedures/Xrays and details which were not copied into this note but were reviewed prior to creation of Plan    Xr Myelo Lumbar    Result Date: 1/7/2021  EXAM: LUMBAR SPINE MYELOGRAM AND POSTMYELOGRAM LUMBAR SPINE CT INDICATION: Bilateral lower extremity weakness, severe low back pain, prior lumbar fusion, possible cauda equina syndrome, cardiac pacing device unable to undergo MRI COMPARISON: Noncontrast CT lumbar spine earlier the same day TECHNIQUE: Written informed consent was obtained from the patient after thorough explanation of the procedure, risks, and benefits. The patient was placed prone upon the fluoroscopy table. The patient's back was prepped and draped in the normal sterile fashion. 1% lidocaine buffered with sodium bicarbonate was used for local anesthetic. Next using fluoroscopic guidance, a 22 gauge three and half-inch spinal needle was advanced into the thecal sac at the L3 level through laminectomy defect. Immediate CSF return was seen. Then approximately 15 cc of Isovue 200-M contrast was slowly instilled into the thecal sac under intermittent fluoroscopic observation. The stylet was replaced. The needle was removed. Hemostasis was achieved. There were no immediate complications. Multiple routine digital fluoroscopic images were then obtained. Patient was unable to tolerate upright imaging due to leg weakness. The patient was sent to the CT suite for a postmyelogram lumbar spine CT. Multiple axial CT images of the lumbar spine were performed. Sagittal and coronal reconstructions were also performed. One or more dose reduction techniques were used on this CT: automated exposure control, adjustment of the mAs and/or kVp according to patient's size, and iterative reconstruction techniques.  The specific techniques utilized on this CT exam have been documented in the patient's electronic medical record. Digital Imaging and Communications in Medicine (DICOM) format image data are available to nonaffiliated external healthcare facilities or entities on a secure, media free, reciprocally searchable basis with patient authorization for at least a 12-month period after this study. Fluoroscopy radiation dose (reference air kerma), in mGy: 56.50 GUIDANCE: Fluoroscopic guidance was used to position (and confirm the position of) the spinal needle. Image(s) saved in PACS: Fluoroscopy ____________________ FINDINGS: LUMBAR MYELOGRAM The patient has previously undergone spinal fusion of the L2-S1 levels with bipedicular screws and fusion rods. Accompanying laminectomy changes are present at L2-L5 levels. Additional interbody graft fusion has been performed of each of the disc levels. The L5-S1 interbody graft is from an anterior approach with interlocking screws. Slight L1-L2 retrolisthesis is present. Of note, upright imaging not able to be obtained. Patient had limited mobility on the table limiting overall myelographic evaluation. No high-grade central stenosis is seen. No hardware failure is seen. Lucency is associated with the right L2 pedicle screw exuberant paraspinal calcification/osteophyte is present at the L1-L2 junctional level. Please refer to the post-myelogram CT findings below for further details. POSTMYELOGRAM LUMBAR SPINE CT Post surgical changes from L2-S1 spinal fusion are again with bipedicular screws and interlocking fusion rods. Additional interbody graft fusion is present at each of the 4 disc levels with anterior approach for the L5-S1 interbody graft. Accompanying laminectomy changes are seen. Slight L1-L2 retrolisthesis is present. There is abnormal hypodensity associated with the superior lateral aspect of the L2 vertebral body with a somewhat oblique configuration. Slight depression of the superior endplate is present.  This hypodensity represents a fracture line and extends to the superior margin of the pedicle screw and further posteriorly through the right pedicle through the osseous graft fusion material. Adjacent hypodensity is present partially surrounding this right L2 pedicle screw. There may be an additional incomplete fracture line through the inferior aspect of the L1 spinous process. There is prominent curvilinear calcification representing callus formation and/or developing osteophyte along the lateral margins of this L1-2 disc space. No solid bridging bone is present. Other vertebral body heights are preserved. The conus medullaris is located at the L1-L2 level and has a normal appearance. No distal cord or conus impingement is seen. T12-L1 level: There is no central or foraminal stenosis. L1/2 level: Disc bulge is present slightly uncovered by the retrolisthesis. No central stenosis is present. Mild foraminal stenosis is present. L2/3 level: This level is a prior surgical fusion level. Thickened calcification is present along the posterior margin of the disc, perhaps representing ossification of posterior longitudinal ligament or osseous graft fusion material, unchanged. Minimal foraminal narrowing is present. No central stenosis is present. L3/4 level: This level is a prior surgical fusion level. There is no central or foraminal stenosis. L4/5 level: This level is a prior surgical fusion level. There is no central or foraminal stenosis. L5/S1 level: This level is a prior surgical fusion level. Hypertrophic changes produce moderate bilateral foraminal stenosis. No central stenosis is present. Partially visualized prostate is enlarged and impresses upon the base of the urinary bladder. There is abnormal bladder wall thickening present. Small hypodense outpouchings are seen laterally suggesting bladder diverticula. Atherosclerotic calcifications are present. ____________________     IMPRESSION: 1.  Postsurgical changes from prior L2-S1 instrument fusion with bipedicular screws and interlocking fusion rods as well as interbody graft fusion placement at each of the fusion levels including anterior approach L5-S1 grafts with interlocking screws. Accompanying laminectomies are seen. -No central stenosis at the postsurgical levels. No impingement upon the distal cord/conus. 2. Fracture involving the superior lateral aspect of the L2 vertebral body extending along the right L2 screw through the right pedicle. Fracture age is strictly indeterminate but suspect subacute. Prominent paraspinal callus formation and developing osteophyte. 3. Slight L1-L2 retrolisthesis without short-term interval change. Patient unable to tolerate upright or flexion/extension positioning for further evaluation. 4. No additional fracture is seen. 5. No high-grade central or foraminal stenosis. Additional mild multilevel degenerative changes throughout the lumbar spine , as described in detail in Findings section. 6. Abnormal bladder wall thickening which may reflect bladder outlet obstruction and/or cystitis. Associated small bilateral bladder diverticula. Ct Spine Lumb Wo Cont    Result Date: 1/7/2021  Examination: CT lumbar spine without contrast. HISTORY: Patient with increased weakness in bilateral lower extremities, progressive over several days. TECHNIQUE: CT imaging of the lumbar spine was performed in the axial plane utilizing both bone and soft tissue reconstruction algorithms. Standard coronal and sagittal reformatted images were performed by the technologist and are included in interpretation. One or more dose reduction techniques were used on this CT: automated exposure control, adjustment of the mAs and/or kVp according to patient size, and iterative reconstruction techniques. The specific techniques used on this CT exam have been documented in the patient's electronic medical record.   Digital Imaging and Communications in Medicine (DICOM) format image data are available to nonaffiliated external healthcare facilities or entities on a secure, media free, reciprocally searchable basis with patient authorization for at least a 12-month period after this study. COMPARISON: No relevant prior imaging is available for review or comparison. FINDINGS: There is mild dextrorotatory curvature of the thoracolumbar junction demonstrated. Sagittal reformatted imaging demonstrates mild anterolisthesis of L5 on S1. There are postoperative changes from posterior instrumented spinal fusion extending from L2-S1. Interlaminar fixation cages are noted at L2-L3, L3-L4, L4-L5, and L5-S1. Anterior plate and screw construct across the L5-S1 level is present. There are paired pedicular screws present at the posteriorly decompressed/fused levels. Slight medial position of the right L2 pedicle screw noted. L3-L5 pedicle screws appear centered within the respective pedicles. Minor anterior cortical penetration of the right S1 body screw noted. Vertebral body heights appear preserved. No compression fracture demonstrated. Sacrum is intact. Mild bilateral SI joint osteoarthritis. No suspicious appearing lytic or blastic osseous lesion. Secondary to beam hardening artifact incurred from the indwelling spinal instrumentation, contents of the spinal canal are not well assessed by this examination. Prominent disc osteophyte at the L2-L3 level noted with likely ventral impression of thecal sac; however detail is limited. No high-grade neuroforaminal stenosis demonstrated. Review retroperitoneal soft tissue structures demonstrates relatively diffuse aortobiiliac atherosclerotic vascular calcification without evidence of aneurysmal dilatation. No adenopathy or free pelvic fluid. IMPRESSION: 1. Postoperative changes from posterior instrumented spinal fusion, decompression, and interlaminar fixation L2-S1 without acute osseous abnormality demonstrated.  2. Limited assessment of the contents of the spinal canal secondary to extensive artifact from indwelling spinal instrumentation. No definite findings on this examination to suggest a high-grade osseous canal stenosis with the majority of the lumbar spine posteriorly decompressed. 3. No high-grade neural foraminal stenosis. Ct Spine Lumb W Cont    Result Date: 1/7/2021  EXAM: LUMBAR SPINE MYELOGRAM AND POSTMYELOGRAM LUMBAR SPINE CT INDICATION: Bilateral lower extremity weakness, severe low back pain, prior lumbar fusion, possible cauda equina syndrome, cardiac pacing device unable to undergo MRI COMPARISON: Noncontrast CT lumbar spine earlier the same day TECHNIQUE: Written informed consent was obtained from the patient after thorough explanation of the procedure, risks, and benefits. The patient was placed prone upon the fluoroscopy table. The patient's back was prepped and draped in the normal sterile fashion. 1% lidocaine buffered with sodium bicarbonate was used for local anesthetic. Next using fluoroscopic guidance, a 22 gauge three and half-inch spinal needle was advanced into the thecal sac at the L3 level through laminectomy defect. Immediate CSF return was seen. Then approximately 15 cc of Isovue 200-M contrast was slowly instilled into the thecal sac under intermittent fluoroscopic observation. The stylet was replaced. The needle was removed. Hemostasis was achieved. There were no immediate complications. Multiple routine digital fluoroscopic images were then obtained. Patient was unable to tolerate upright imaging due to leg weakness. The patient was sent to the CT suite for a postmyelogram lumbar spine CT. Multiple axial CT images of the lumbar spine were performed. Sagittal and coronal reconstructions were also performed. One or more dose reduction techniques were used on this CT: automated exposure control, adjustment of the mAs and/or kVp according to patient's size, and iterative reconstruction techniques.  The specific techniques utilized on this CT exam have been documented in the patient's electronic medical record. Digital Imaging and Communications in Medicine (DICOM) format image data are available to nonaffiliated external healthcare facilities or entities on a secure, media free, reciprocally searchable basis with patient authorization for at least a 12-month period after this study. Fluoroscopy radiation dose (reference air kerma), in mGy: 56.50 GUIDANCE: Fluoroscopic guidance was used to position (and confirm the position of) the spinal needle. Image(s) saved in PACS: Fluoroscopy ____________________ FINDINGS: LUMBAR MYELOGRAM The patient has previously undergone spinal fusion of the L2-S1 levels with bipedicular screws and fusion rods. Accompanying laminectomy changes are present at L2-L5 levels. Additional interbody graft fusion has been performed of each of the disc levels. The L5-S1 interbody graft is from an anterior approach with interlocking screws. Slight L1-L2 retrolisthesis is present. Of note, upright imaging not able to be obtained. Patient had limited mobility on the table limiting overall myelographic evaluation. No high-grade central stenosis is seen. No hardware failure is seen. Lucency is associated with the right L2 pedicle screw exuberant paraspinal calcification/osteophyte is present at the L1-L2 junctional level. Please refer to the post-myelogram CT findings below for further details. POSTMYELOGRAM LUMBAR SPINE CT Post surgical changes from L2-S1 spinal fusion are again with bipedicular screws and interlocking fusion rods. Additional interbody graft fusion is present at each of the 4 disc levels with anterior approach for the L5-S1 interbody graft. Accompanying laminectomy changes are seen. Slight L1-L2 retrolisthesis is present. There is abnormal hypodensity associated with the superior lateral aspect of the L2 vertebral body with a somewhat oblique configuration. Slight depression of the superior endplate is present.  This hypodensity represents a fracture line and extends to the superior margin of the pedicle screw and further posteriorly through the right pedicle through the osseous graft fusion material. Adjacent hypodensity is present partially surrounding this right L2 pedicle screw. There may be an additional incomplete fracture line through the inferior aspect of the L1 spinous process. There is prominent curvilinear calcification representing callus formation and/or developing osteophyte along the lateral margins of this L1-2 disc space. No solid bridging bone is present. Other vertebral body heights are preserved. The conus medullaris is located at the L1-L2 level and has a normal appearance. No distal cord or conus impingement is seen. T12-L1 level: There is no central or foraminal stenosis. L1/2 level: Disc bulge is present slightly uncovered by the retrolisthesis. No central stenosis is present. Mild foraminal stenosis is present. L2/3 level: This level is a prior surgical fusion level. Thickened calcification is present along the posterior margin of the disc, perhaps representing ossification of posterior longitudinal ligament or osseous graft fusion material, unchanged. Minimal foraminal narrowing is present. No central stenosis is present. L3/4 level: This level is a prior surgical fusion level. There is no central or foraminal stenosis. L4/5 level: This level is a prior surgical fusion level. There is no central or foraminal stenosis. L5/S1 level: This level is a prior surgical fusion level. Hypertrophic changes produce moderate bilateral foraminal stenosis. No central stenosis is present. Partially visualized prostate is enlarged and impresses upon the base of the urinary bladder. There is abnormal bladder wall thickening present. Small hypodense outpouchings are seen laterally suggesting bladder diverticula. Atherosclerotic calcifications are present. ____________________     IMPRESSION: 1.  Postsurgical changes from prior L2-S1 instrument fusion with bipedicular screws and interlocking fusion rods as well as interbody graft fusion placement at each of the fusion levels including anterior approach L5-S1 grafts with interlocking screws. Accompanying laminectomies are seen. -No central stenosis at the postsurgical levels. No impingement upon the distal cord/conus. 2. Fracture involving the superior lateral aspect of the L2 vertebral body extending along the right L2 screw through the right pedicle. Fracture age is strictly indeterminate but suspect subacute. Prominent paraspinal callus formation and developing osteophyte. 3. Slight L1-L2 retrolisthesis without short-term interval change. Patient unable to tolerate upright or flexion/extension positioning for further evaluation. 4. No additional fracture is seen. 5. No high-grade central or foraminal stenosis. Additional mild multilevel degenerative changes throughout the lumbar spine , as described in detail in Findings section. 6. Abnormal bladder wall thickening which may reflect bladder outlet obstruction and/or cystitis. Associated small bilateral bladder diverticula. Xr Chest Port    Result Date: 1/7/2021  EXAM: XR CHEST PORT CLINICAL INDICATION/HISTORY: sepsis   > Additional: None. COMPARISON: June 17, 2014 TECHNIQUE: Portable chest _______________ FINDINGS: SUPPORT DEVICES: Left subclavian approach cardiac pacer device is now seen with leads projecting over the right atrium, right ventricle, and coronary sinus. HEART AND MEDIASTINUM: Normal size and contour. Normal pulmonary vasculature. LUNGS AND PLEURAL SPACES: The lungs are well expanded and clear. No focal consolidation, effusion, or pneumothorax. BONY THORAX AND SOFT TISSUES: Left shoulder arthroplasty hardware is noted with degenerative changes present in both acromioclavicular joints. _______________     IMPRESSION: No active cardiopulmonary disease.

## 2021-01-09 NOTE — PROGRESS NOTES
Progress Note        Patient: Chelly Tenorio MRN: [id number]  SSN: [id number]    YOB: 1940  Age: 68 y.o. Sex: male      * No surgery found * status post     Admit Date: 2021  Admit Diagnosis: UTI (urinary tract infection) [N39.0]    Subjective:      Patient being followed by ortho after admission for B/L LE weakness. Doing well. No complaints. No SOB. No Chest Pain. No Nausea or Vomiting. No problems eating or voiding. Objective:        Temp (24hrs), Av.8 °F (37.1 °C), Min:98.1 °F (36.7 °C), Max:99.7 °F (37.6 °C)    Body mass index is 29.84 kg/m². Patient Vitals for the past 12 hrs:   BP Temp Pulse Resp SpO2   21 0742 (!) 145/78 98.1 °F (36.7 °C) 90 17 100 %   21 0428 126/64 98.2 °F (36.8 °C) 81 17 100 %   21 0039 134/62 99.7 °F (37.6 °C) 87 17 94 %     Recent Labs     21  0205 21  1509 21  1509   HGB 10.9*   < > 13.8   HCT 33.5*   < > 41.9   INR  --   --  1.3*      < > 138   K 4.2   < > 4.3      < > 100   CO2 22   < > 25   BUN 35*   < > 35*   CREA 1.28   < > 1.69*   *   < > 149*    < > = values in this interval not displayed. Physical Exam:  Vital Signs are Stable. No Acute Distress. Alert and Oriented. Negative Homans sign. Toes AROM Full. Neurovascular exam is normal with the exception of ongoing decreased sensation to feet. Assessment/Plan:     1. Continue valium for spasms   2.  Neuro consult recommended per Dr. Luis Langley remains available as needed    Discharge Plan: per primary team    Signed By: DIGNA Shirley     2021

## 2021-01-09 NOTE — PROGRESS NOTES
Bedside and Verbal shift change report given to Cristobal Porras  RN  (oncoming nurse) by Marcus BUNN, RN (offgoing nurse). Report included the following information SBAR, Kardex and MAR. SHIFT UPDATES:  Patient presented with STAT Orthopedic consult since 1/7/2021 at 1800 pm with Dr. Syed Alvarado but no consult or note indicating plan of care presented on file for patient. Attending Hospitalist Dr. Krys Damon was paged mid-day who instituted Cardiac Regular diet. Several attempts were made to reach on call Orthopedic Surgeon Dr. Syed Alvarado in accordance to consult for possible surgical intervention of bilateral leg weakness but all attempts were unsuccessful. At 1704 pm, on call Orthopedic Surgeon Dr Silva Patel was contacted and was informed of Myelogram result of L2 fracture as requested. Dr. Silva Patel stated that he would come and assess patient. Patient had head CT performed today due to periods of memory loss. Results are pending. ABNORMAL LAB:   Results for Patricia Valdes (MRN [id number]) as of 1/8/2021 15:46   Ref. Range 1/8/2021 01:20   WBC Latest Ref Range: 4.6 - 13.2 K/uL 13.7 (H)   RBC Latest Ref Range: 4.70 - 5.50 M/uL 3.94 (L)   HGB Latest Ref Range: 13.0 - 16.0 g/dL 11.7 (L)   HCT Latest Ref Range: 36.0 - 48.0 % 36.0   MCV Latest Ref Range: 74.0 - 97.0 FL 91.4   MCH Latest Ref Range: 24.0 - 34.0 PG 29.7   MCHC Latest Ref Range: 31.0 - 37.0 g/dL 32.5   RDW Latest Ref Range: 11.6 - 14.5 % 16.3 (H)   PLATELET Latest Ref Range: 135 - 420 K/uL 242   MPV Latest Ref Range: 9.2 - 11.8 FL 9.2   NEUTROPHILS Latest Ref Range: 40 - 73 % 84 (H)   LYMPHOCYTES Latest Ref Range: 21 - 52 % 8 (L)   MONOCYTES Latest Ref Range: 3 - 10 % 8   EOSINOPHILS Latest Ref Range: 0 - 5 % 0   BASOPHILS Latest Ref Range: 0 - 2 % 0   DF Latest Units:   AUTOMATED   ABS. NEUTROPHILS Latest Ref Range: 1.8 - 8.0 K/UL 11.5 (H)   ABS. LYMPHOCYTES Latest Ref Range: 0.9 - 3.6 K/UL 1.1   ABS.  MONOCYTES Latest Ref Range: 0.05 - 1.2 K/UL 1.1   ABS. EOSINOPHILS Latest Ref Range: 0.0 - 0.4 K/UL 0.0   ABS. BASOPHILS Latest Ref Range: 0.0 - 0.1 K/UL 0.0     Wounds? None. Central Lines? None. Last BM:  1/7/2021      Pending Labs for AM Draw: At 4 am on 1/9/2021 CBC with diff, Magnesium level and BMP. Discharge plan:  As of 01/8/2021 at 954 am case management note states will follow up in accordance to patient's discharge transitions needs as directed. Nightshift RN staff will be notified to inform dayshift RN Staff on 01/7/2021 to follow up with case management staff in accordance to discharge location and transition needs.      Jamie Dalton  1/8/2021  7:40 PM

## 2021-01-09 NOTE — PROGRESS NOTES
Problem: Mobility Impaired (Adult and Pediatric)  Goal: *Acute Goals and Plan of Care (Insert Text)  Description: Physical Therapy Goals  Initiated 1/9/2021 and to be accomplished within 3-7 day(s)  1. Patient will move from supine to sit and sit to supine  and roll side to side in bed with minimal assistance/contact guard assist.    2.  Patient will transfer from bed to chair and chair to bed with moderate assistance  using the least restrictive device. 3.  Patient will perform sit to stand with maximal assistance. Will add amb goals when appropriate     Note:   PHYSICAL THERAPY EVALUATION    Patient: Taz Hammer (22 y.o. male)  Date: 1/9/2021  Primary Diagnosis: UTI (urinary tract infection) [N39.0]  Precautions:  Fall    ASSESSMENT :  Based on the objective data described below, the patient presents with lower extremity weakness, impaired bed mobility, decreased B LE ROM/flexibility, increased B LE pain, and overall limitations in functional mobility. Pt agreeable with encouragement. CNA in to assist with cleaning up pt, MaxA for rolling. Pt performed supine to sit with MaxA. Upon sitting EOB required MaxA for sitting balance. Unsafe to attempt standing/amb at this time. Pt with visible and audible reports of pain with all movement. Pt reports he has a rollator, w/c and hospital bed at home. He reports his wife is available and frequently assists as needed for ADLs. Pt initially declined rehab placement. Wife entered midway through session. PT voiced concerns to wife of pt requiring heavy assist and rehab recommended at this time. Patient would benefit from skilled inpatient physical therapy to address deficits, progress as tolerated to achieve long term goals and allow safe discharge. Patient will benefit from skilled intervention to address the above impairments.   Patients rehabilitation potential is considered to be Fair  Factors which may influence rehabilitation potential include:   [] None noted  []         Mental ability/status  []         Medical condition  [x]         Home/family situation and support systems  [x]         Safety awareness  [x]         Pain tolerance/management  []         Other:      PLAN :  Recommendations and Planned Interventions:  [x]           Bed Mobility Training             []    Neuromuscular Re-Education  [x]           Transfer Training                   []    Orthotic/Prosthetic Training  [x]           Gait Training                          []    Modalities  [x]           Therapeutic Exercises          []    Edema Management/Control  [x]           Therapeutic Activities            [x]    Patient and Family Training/Education  []           Other (comment):    Frequency/Duration: Patient will be followed by physical therapy 1-2 times per day to address goals. 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 [email or web address]    HX SHOULDER REPLACEMENT  2009    HX TONSILLECTOMY      IN CARDIAC SURG PROCEDURE UNLIST  2009    stent placement     Barriers to Learning/Limitations: None  Compensate with: Visual Cues, Verbal Cues, and Tactile Cues  Prior Level of Function/Home Situation: Short distance amb with Rollator  Home Situation  Home Environment: Private residence  Wheelchair Ramp: Yes  One/Two Story Residence: Two story  # of Interior Steps: 14  Lift Chair Available: No  Living Alone: No  Support Systems: Spouse/Significant Other/Partner, Family member(s)  Patient Expects to be Discharged to[de-identified] Private residence  Current DME Used/Available at Home: Ashtyn Aragon, Hima Hogan, Walker, rollator, Hospital bed, Wheelchair  Strength:    Strength: Generally decreased, functional  Tone & Sensation:   Tone: Abnormal  Sensation: Impaired  Range Of Motion:  AROM: Generally decreased, functional  PROM: Generally decreased, functional  Functional Mobility:  Bed Mobility:  Supine to Sit: Maximum assistance; Additional time;Bed Modified  Sit to Supine: Maximum assistance; Additional time  Balance:   Sitting: Impaired; With support  Sitting - Static: Poor (constant support)  Ambulation/Gait Training:  Right Side Weight Bearing: As tolerated  Left Side Weight Bearing: As tolerated  Pain:  Pain Scale 1: Numeric (0 - 10)  Pain Intensity 1: 0  Pain Location 1: Foot  Pain Orientation 1: Right;Left  Pain Description 1: Other (comment)(spasms)  Pain Intervention(s) 1: Medication (see MAR)  Activity Tolerance:   Fair  Please refer to the flowsheet for vital signs taken during this treatment. After treatment:   []         Patient left in no apparent distress sitting up in chair  [x]         Patient left in no apparent distress in bed  [x]         Call bell left within reach  [x]         Nursing notified  []         Caregiver present  [x]         Bed alarm activated    COMMUNICATION/EDUCATION:   [x]         Fall prevention education was provided and the patient/caregiver indicated understanding. [x]         Patient/family have participated as able in goal setting and plan of care. [x]         Patient/family agree to work toward stated goals and plan of care. []         Patient understands intent and goals of therapy, but is neutral about his/her participation.   []         Patient is unable to participate in goal setting and plan of care.     Thank you for this referral.  Daljit Running [email or web address]   Time Calculation: 45 mins   Eval Complexity: History: MEDIUM  Complexity : 1-2 comorbidities / personal factors will impact the outcome/ POC Exam:MEDIUM Complexity : 3 Standardized tests and measures addressing body structure, function, activity limitation and / or participation in recreation  Presentation: MEDIUM Complexity : Evolving with changing characteristics  Clinical Decision Making:Medium Complexity    Overall Complexity:MEDIUM

## 2021-01-09 NOTE — ROUTINE PROCESS
TRANSFER - OUT REPORT: 
 
Verbal report given to Jairo Archuleta (name) on [facility]  being transferred to [address] [place] [address](unit) for routine progression of care Report consisted of patients Situation, Background, Assessment and  
Recommendations(SBAR). Information from the following report(s) SBAR and Procedure Summary was reviewed with the receiving nurse. Opportunity for questions and clarification was provided. Patient transported with: 
 [employer]

## 2021-01-09 NOTE — ROUTINE PROCESS
0700 received SBAR from night shift RN. Patient resting quietly in bed. Alert to self, and stated he fell at home. Didn't know he was in the hospital and stated date was Dec 2020. Patient with weakness in all extremities. Dr Ken Pedroza in to see patient. Neurologist in to see patient. 1400 patient taken down for lumbar puncture. 1530 patient back on floor. Patient to lay flat for 24-48 hrs per radiology. Patient aware. Patient requested Morphine for back and foot pain. 1 mg given. Reassessed and patient stated relief. Patient Vitals for the past 12 hrs: 
 Temp Pulse Resp BP SpO2  
01/09/21 1542 98.1 °F (36.7 °C) 95 17 120/65 97 % 01/09/21 1131 98.7 °F (37.1 °C) 91 17 124/63 98 % 01/09/21 0742 98.1 °F (36.7 °C) 90 17 (!) 145/78 100 % Bedside and Verbal shift change report given to Rene Downs RN (oncoming nurse) by Keke Lombardi RN (offgoing nurse). Report included the following information SBAR, Kardex, Intake/Output, MAR and Recent Results.

## 2021-01-10 ENCOUNTER — APPOINTMENT (OUTPATIENT)
Dept: GENERAL RADIOLOGY | Age: 81
DRG: 565 | End: 2021-01-10
Attending: INTERNAL MEDICINE
Payer: MEDICARE

## 2021-01-10 LAB
ANION GAP SERPL CALC-SCNC: 9 MMOL/L (ref 3–18)
BACTERIA SPEC CULT: ABNORMAL
BASOPHILS # BLD: 0 K/UL (ref 0–0.1)
BASOPHILS NFR BLD: 0 % (ref 0–2)
BUN SERPL-MCNC: 30 MG/DL (ref 7–18)
BUN/CREAT SERPL: 23 (ref 12–20)
CALCIUM SERPL-MCNC: 8.6 MG/DL (ref 8.5–10.1)
CC UR VC: ABNORMAL
CHLORIDE SERPL-SCNC: 106 MMOL/L (ref 100–111)
CK SERPL-CCNC: 1682 U/L (ref 39–308)
CO2 SERPL-SCNC: 23 MMOL/L (ref 21–32)
CREAT SERPL-MCNC: 1.32 MG/DL (ref 0.6–1.3)
DIFFERENTIAL METHOD BLD: ABNORMAL
EOSINOPHIL # BLD: 0.1 K/UL (ref 0–0.4)
EOSINOPHIL NFR BLD: 1 % (ref 0–5)
ERYTHROCYTE [DISTWIDTH] IN BLOOD BY AUTOMATED COUNT: 16.2 % (ref 11.6–14.5)
GLUCOSE SERPL-MCNC: 115 MG/DL (ref 74–99)
HCT VFR BLD AUTO: 35.7 % (ref 36–48)
HGB BLD-MCNC: 11.4 G/DL (ref 13–16)
LYMPHOCYTES # BLD: 1.1 K/UL (ref 0.9–3.6)
LYMPHOCYTES NFR BLD: 9 % (ref 21–52)
MAGNESIUM SERPL-MCNC: 2.7 MG/DL (ref 1.6–2.6)
MCH RBC QN AUTO: 29.1 PG (ref 24–34)
MCHC RBC AUTO-ENTMCNC: 31.9 G/DL (ref 31–37)
MCV RBC AUTO: 91.1 FL (ref 74–97)
MONOCYTES # BLD: 1.2 K/UL (ref 0.05–1.2)
MONOCYTES NFR BLD: 10 % (ref 3–10)
NEUTS SEG # BLD: 10.1 K/UL (ref 1.8–8)
NEUTS SEG NFR BLD: 80 % (ref 40–73)
PLATELET # BLD AUTO: 226 K/UL (ref 135–420)
PMV BLD AUTO: 8.8 FL (ref 9.2–11.8)
POTASSIUM SERPL-SCNC: 4.2 MMOL/L (ref 3.5–5.5)
RBC # BLD AUTO: 3.92 M/UL (ref 4.7–5.5)
SERVICE CMNT-IMP: ABNORMAL
SODIUM SERPL-SCNC: 138 MMOL/L (ref 136–145)
WBC # BLD AUTO: 12.5 K/UL (ref 4.6–13.2)

## 2021-01-10 PROCEDURE — 74011250636 HC RX REV CODE- 250/636: Performed by: INTERNAL MEDICINE

## 2021-01-10 PROCEDURE — 36415 COLL VENOUS BLD VENIPUNCTURE: CPT

## 2021-01-10 PROCEDURE — 85025 COMPLETE CBC W/AUTO DIFF WBC: CPT

## 2021-01-10 PROCEDURE — 74011000250 HC RX REV CODE- 250: Performed by: HOSPITALIST

## 2021-01-10 PROCEDURE — 74011250636 HC RX REV CODE- 250/636: Performed by: HOSPITALIST

## 2021-01-10 PROCEDURE — 83735 ASSAY OF MAGNESIUM: CPT

## 2021-01-10 PROCEDURE — 74011250637 HC RX REV CODE- 250/637: Performed by: PSYCHIATRY & NEUROLOGY

## 2021-01-10 PROCEDURE — 74011250637 HC RX REV CODE- 250/637: Performed by: HOSPITALIST

## 2021-01-10 PROCEDURE — 74011250637 HC RX REV CODE- 250/637: Performed by: ORTHOPAEDIC SURGERY

## 2021-01-10 PROCEDURE — 73620 X-RAY EXAM OF FOOT: CPT

## 2021-01-10 PROCEDURE — 74011000258 HC RX REV CODE- 258: Performed by: INTERNAL MEDICINE

## 2021-01-10 PROCEDURE — 82550 ASSAY OF CK (CPK): CPT

## 2021-01-10 PROCEDURE — 80048 BASIC METABOLIC PNL TOTAL CA: CPT

## 2021-01-10 PROCEDURE — 65270000029 HC RM PRIVATE

## 2021-01-10 PROCEDURE — 73610 X-RAY EXAM OF ANKLE: CPT

## 2021-01-10 RX ADMIN — BACLOFEN 5 MG: 10 TABLET ORAL at 16:34

## 2021-01-10 RX ADMIN — BACLOFEN 5 MG: 10 TABLET ORAL at 21:42

## 2021-01-10 RX ADMIN — BACLOFEN 5 MG: 10 TABLET ORAL at 09:34

## 2021-01-10 RX ADMIN — SODIUM CHLORIDE 75 ML/HR: 900 INJECTION, SOLUTION INTRAVENOUS at 23:44

## 2021-01-10 RX ADMIN — SODIUM CHLORIDE 50 ML/HR: 900 INJECTION, SOLUTION INTRAVENOUS at 11:27

## 2021-01-10 RX ADMIN — CEFTRIAXONE 1 G: 1 INJECTION, POWDER, FOR SOLUTION INTRAMUSCULAR; INTRAVENOUS at 16:35

## 2021-01-10 RX ADMIN — Medication 5 ML: at 14:03

## 2021-01-10 RX ADMIN — PIPERACILLIN AND TAZOBACTAM 3.38 G: 3; .375 INJECTION, POWDER, LYOPHILIZED, FOR SOLUTION INTRAVENOUS at 23:44

## 2021-01-10 RX ADMIN — DOCUSATE SODIUM 100 MG: 100 CAPSULE, LIQUID FILLED ORAL at 09:34

## 2021-01-10 RX ADMIN — Medication 10 ML: at 21:42

## 2021-01-10 RX ADMIN — MORPHINE SULFATE 2 MG: 2 INJECTION, SOLUTION INTRAMUSCULAR; INTRAVENOUS at 03:47

## 2021-01-10 RX ADMIN — PIPERACILLIN AND TAZOBACTAM 3.38 G: 3; .375 INJECTION, POWDER, LYOPHILIZED, FOR SOLUTION INTRAVENOUS at 18:33

## 2021-01-10 RX ADMIN — DIAZEPAM 2.5 MG: 5 TABLET ORAL at 14:01

## 2021-01-10 NOTE — PROGRESS NOTES
NEUROLOGY PROGRESS NOTE        Patient: Berenice Parks        Sex: male          DOA: 1/7/2021  YOB: 1940      Age:  68 y.o.         LOS: 3 days     Identification:  [id number] male presents with bilateral foot pain after a fall. SUBJECTIVE:   Patient had a lumbar puncture yesterday. CSF study was unremarkable except elevated RBC of 200s and protein 91. Patient feels better today in terms of pain. He can move legs more. His wife was at bedside. She related that patient had lumbar spine surgery in June 2020. His weakness is better with physical therapy and to last Sunday when he fell down from 13 stairs. 2 days after that his weakness is worse secondary to severe pain in bilateral lower limbs. Patient could not get out of the bed. OBJECTIVE:      Visit Vitals  /66 (BP 1 Location: Left arm, BP Patient Position: At rest)   Pulse 94   Temp 98 °F (36.7 °C)   Resp 16   Ht 5' 10\" (1.778 m)   Wt 94.3 kg (208 lb)   SpO2 99%   BMI 29.84 kg/m²     Physical Exam:  GEN: Alert, NAD  EYES: conjunctiva normal, lids with out lesions  HEENT: MMM. HEART: RRR +S1 +S2  LUNGS: CTA B/L no rales or rhonchi. ABDOMEN: Soft, non-tender. EXTREMITIES: No edema cyanosis  SKIN: no rashes or skin breakdown, no nodules  NEURO: Alert, oriented x3. Speech is fluent. Cranials: Face is symmetric. Smiles symmetrically. EOMI, VFF. Tongue is midline. Motor:5/5 BUE. 4-/5 BLE. DTR 1+ BUE Sensory: Intact to crude touch on all four. Coordination: Intact with no dysmetria during FNF.      Current Facility-Administered Medications   Medication Dose Route Frequency    baclofen (LIORESAL) tablet 5 mg  5 mg Oral TID    diazePAM (VALIUM) tablet 2.5 mg  2.5 mg Oral Q6H PRN    0.9% sodium chloride infusion  50 mL/hr IntraVENous CONTINUOUS    sodium chloride (NS) flush 5-40 mL  5-40 mL IntraVENous Q8H    sodium chloride (NS) flush 5-40 mL  5-40 mL IntraVENous PRN    acetaminophen (TYLENOL) tablet 650 mg  650 mg Oral Q6H PRN    Or   • acetaminophen (TYLENOL) suppository 650 mg  650 mg Rectal Q6H PRN   • bisacodyL (DULCOLAX) suppository 10 mg  10 mg Rectal DAILY PRN   • promethazine (PHENERGAN) tablet 12.5 mg  12.5 mg Oral Q6H PRN    Or   • ondansetron (ZOFRAN) injection 4 mg  4 mg IntraVENous Q6H PRN   • cefTRIAXone (ROCEPHIN) 1 g in sterile water (preservative free) 10 mL IV syringe  1 g IntraVENous Q24H   • [Held by provider] heparin (porcine) injection 5,000 Units  5,000 Units SubCUTAneous Q8H   • morphine injection 1-2 mg  1-2 mg IntraVENous Q4H PRN   • docusate sodium (COLACE) capsule 100 mg  100 mg Oral DAILY       Laboratory  Recent Results (from the past 24 hour(s))   CELL COUNT, CSF    Collection Time: 01/09/21  2:56 PM   Result Value Ref Range    CSF TUBE NO. 3      CSF COLOR COLORLESS      CSF APPEARANCE CLEAR      CSF RBCs 268 (H) 0 /cu mm    CSF WBCs 2 <5 /cu mm   GLUCOSE, CSF    Collection Time: 01/09/21  2:56 PM   Result Value Ref Range    Tube No. 3      Glucose,CSF 58 40 - 70 MG/DL   PROTEIN, CSF    Collection Time: 01/09/21  2:56 PM   Result Value Ref Range    Tube No. 2      Protein,CSF 91 (H) 15 - 45 MG/DL   CULTURE, CSF W GRAM STAIN    Collection Time: 01/09/21  2:56 PM    Specimen: Cerebrospinal Fluid   Result Value Ref Range    Special Requests: NO SPECIAL REQUESTS      GRAM STAIN NO ORGANISMS SEEN      Culture result: PENDING    METABOLIC PANEL, BASIC    Collection Time: 01/10/21  2:37 AM   Result Value Ref Range    Sodium 138 136 - 145 mmol/L    Potassium 4.2 3.5 - 5.5 mmol/L    Chloride 106 100 - 111 mmol/L    CO2 23 21 - 32 mmol/L    Anion gap 9 3.0 - 18 mmol/L    Glucose 115 (H) 74 - 99 mg/dL    BUN 30 (H) 7.0 - 18 MG/DL    Creatinine 1.32 (H) 0.6 - 1.3 MG/DL    BUN/Creatinine ratio 23 (H) 12 - 20      GFR est AA >60 >60 ml/min/1.73m2    GFR est non-AA 52 (L) >60 ml/min/1.73m2    Calcium 8.6 8.5 - 10.1 MG/DL   CBC WITH AUTOMATED DIFF    Collection Time: 01/10/21  2:37 AM   Result Value Ref Range     WBC 12.5 4.6 - 13.2 K/uL    RBC 3.92 (L) 4.70 - 5.50 M/uL    HGB 11.4 (L) 13.0 - 16.0 g/dL    HCT 35.7 (L) 36.0 - 48.0 %    MCV 91.1 74.0 - 97.0 FL    MCH 29.1 24.0 - 34.0 PG    MCHC 31.9 31.0 - 37.0 g/dL    RDW 16.2 (H) 11.6 - 14.5 %    PLATELET 136 501 - 153 K/uL    MPV 8.8 (L) 9.2 - 11.8 FL    NEUTROPHILS 80 (H) 40 - 73 %    LYMPHOCYTES 9 (L) 21 - 52 %    MONOCYTES 10 3 - 10 %    EOSINOPHILS 1 0 - 5 %    BASOPHILS 0 0 - 2 %    ABS. NEUTROPHILS 10.1 (H) 1.8 - 8.0 K/UL    ABS. LYMPHOCYTES 1.1 0.9 - 3.6 K/UL    ABS. MONOCYTES 1.2 0.05 - 1.2 K/UL    ABS. EOSINOPHILS 0.1 0.0 - 0.4 K/UL    ABS. BASOPHILS 0.0 0.0 - 0.1 K/UL    DF AUTOMATED     MAGNESIUM    Collection Time: 01/10/21  2:37 AM   Result Value Ref Range    Magnesium 2.7 (H) 1.6 - 2.6 mg/dL   CK    Collection Time: 01/10/21  2:37 AM   Result Value Ref Range    CK 1,682 (H) 39 - 308 U/L       Radiology:  Xr Spinal Punc Lumb Dx    Result Date: 1/9/2021  PROCEDURE:  FLUOROSCOPIC GUIDED LUMBAR PUNCTURE INDICATION:  Bilateral leg weakness, possible given for a syndrome. Obtained CSF for analysis. _______________________________ TECHNIQUE/FINDINGS:  After the procedure as well as its risks, benefits and alternatives was explained to the patient, and all questions answered, verbal informed consent was obtained directly by the patient at the bedside. Examination performed with standard aseptic technique. Using fluoroscopy for guidance a lumbar puncture was performed at the L3 level through a laminectomy defect with a 22 gauge spinal needle after local anesthesia. CSF was clear and colorless. CSF was withdrawn for the requested laboratory evaluation. Total of 12 mL of CSF was obtained. Standard post procedure pause. Patient tolerated the procedure well and there were no immediate complications. Preprocedure timeout:  1446 hours. Radiation dose (reference air kerma):  20.4 mGy.  GUIDANCE: Fluoroscopy guidance was used to position (and confirm the position of) the needle. Image(s) saved in PACS: Fluoroscopy _________________________________     IMPRESSION: 1. Successful fluoroscopic guided diagnostic lumbar puncture. 12 cc of fluid in 4 separate vials was sent to the lab for analysis. Xr Myelo Lumbar    Result Date: 1/7/2021  EXAM: LUMBAR SPINE MYELOGRAM AND POSTMYELOGRAM LUMBAR SPINE CT INDICATION: Bilateral lower extremity weakness, severe low back pain, prior lumbar fusion, possible cauda equina syndrome, cardiac pacing device unable to undergo MRI COMPARISON: Noncontrast CT lumbar spine earlier the same day TECHNIQUE: Written informed consent was obtained from the patient after thorough explanation of the procedure, risks, and benefits. The patient was placed prone upon the fluoroscopy table. The patient's back was prepped and draped in the normal sterile fashion. 1% lidocaine buffered with sodium bicarbonate was used for local anesthetic. Next using fluoroscopic guidance, a 22 gauge three and half-inch spinal needle was advanced into the thecal sac at the L3 level through laminectomy defect. Immediate CSF return was seen. Then approximately 15 cc of Isovue 200-M contrast was slowly instilled into the thecal sac under intermittent fluoroscopic observation. The stylet was replaced. The needle was removed. Hemostasis was achieved. There were no immediate complications. Multiple routine digital fluoroscopic images were then obtained. Patient was unable to tolerate upright imaging due to leg weakness. The patient was sent to the CT suite for a postmyelogram lumbar spine CT. Multiple axial CT images of the lumbar spine were performed. Sagittal and coronal reconstructions were also performed. One or more dose reduction techniques were used on this CT: automated exposure control, adjustment of the mAs and/or kVp according to patient's size, and iterative reconstruction techniques.  The specific techniques utilized on this CT exam have been documented in the patient's electronic medical record. Digital Imaging and Communications in Medicine (DICOM) format image data are available to nonaffiliated external healthcare facilities or entities on a secure, media free, reciprocally searchable basis with patient authorization for at least a 12-month period after this study. Fluoroscopy radiation dose (reference air kerma), in mGy: 56.50 GUIDANCE: Fluoroscopic guidance was used to position (and confirm the position of) the spinal needle. Image(s) saved in PACS: Fluoroscopy ____________________ FINDINGS: LUMBAR MYELOGRAM The patient has previously undergone spinal fusion of the L2-S1 levels with bipedicular screws and fusion rods. Accompanying laminectomy changes are present at L2-L5 levels. Additional interbody graft fusion has been performed of each of the disc levels. The L5-S1 interbody graft is from an anterior approach with interlocking screws. Slight L1-L2 retrolisthesis is present. Of note, upright imaging not able to be obtained. Patient had limited mobility on the table limiting overall myelographic evaluation. No high-grade central stenosis is seen. No hardware failure is seen. Lucency is associated with the right L2 pedicle screw exuberant paraspinal calcification/osteophyte is present at the L1-L2 junctional level. Please refer to the post-myelogram CT findings below for further details. POSTMYELOGRAM LUMBAR SPINE CT Post surgical changes from L2-S1 spinal fusion are again with bipedicular screws and interlocking fusion rods. Additional interbody graft fusion is present at each of the 4 disc levels with anterior approach for the L5-S1 interbody graft. Accompanying laminectomy changes are seen. Slight L1-L2 retrolisthesis is present. There is abnormal hypodensity associated with the superior lateral aspect of the L2 vertebral body with a somewhat oblique configuration. Slight depression of the superior endplate is present.  This hypodensity represents a fracture line and extends to the superior margin of the pedicle screw and further posteriorly through the right pedicle through the osseous graft fusion material. Adjacent hypodensity is present partially surrounding this right L2 pedicle screw. There may be an additional incomplete fracture line through the inferior aspect of the L1 spinous process. There is prominent curvilinear calcification representing callus formation and/or developing osteophyte along the lateral margins of this L1-2 disc space. No solid bridging bone is present. Other vertebral body heights are preserved. The conus medullaris is located at the L1-L2 level and has a normal appearance. No distal cord or conus impingement is seen. T12-L1 level: There is no central or foraminal stenosis. L1/2 level: Disc bulge is present slightly uncovered by the retrolisthesis. No central stenosis is present. Mild foraminal stenosis is present. L2/3 level: This level is a prior surgical fusion level. Thickened calcification is present along the posterior margin of the disc, perhaps representing ossification of posterior longitudinal ligament or osseous graft fusion material, unchanged. Minimal foraminal narrowing is present. No central stenosis is present. L3/4 level: This level is a prior surgical fusion level. There is no central or foraminal stenosis. L4/5 level: This level is a prior surgical fusion level. There is no central or foraminal stenosis. L5/S1 level: This level is a prior surgical fusion level. Hypertrophic changes produce moderate bilateral foraminal stenosis. No central stenosis is present. Partially visualized prostate is enlarged and impresses upon the base of the urinary bladder. There is abnormal bladder wall thickening present. Small hypodense outpouchings are seen laterally suggesting bladder diverticula. Atherosclerotic calcifications are present. ____________________     IMPRESSION: 1.  Postsurgical changes from prior L2-S1 instrument fusion with bipedicular screws and interlocking fusion rods as well as interbody graft fusion placement at each of the fusion levels including anterior approach L5-S1 grafts with interlocking screws. Accompanying laminectomies are seen. -No central stenosis at the postsurgical levels. No impingement upon the distal cord/conus. 2. Fracture involving the superior lateral aspect of the L2 vertebral body extending along the right L2 screw through the right pedicle. Fracture age is strictly indeterminate but suspect subacute. Prominent paraspinal callus formation and developing osteophyte. 3. Slight L1-L2 retrolisthesis without short-term interval change. Patient unable to tolerate upright or flexion/extension positioning for further evaluation. 4. No additional fracture is seen. 5. No high-grade central or foraminal stenosis. Additional mild multilevel degenerative changes throughout the lumbar spine , as described in detail in Findings section. 6. Abnormal bladder wall thickening which may reflect bladder outlet obstruction and/or cystitis. Associated small bilateral bladder diverticula. Ct Head Wo Cont    Result Date: 1/9/2021  EXAM: CT head INDICATION: Lower extremity weakness. COMPARISON: 10/12/2018. TECHNIQUE: Axial CT imaging of the head was performed without intravenous contrast. One or more dose reduction techniques were used on this CT: automated exposure control, adjustment of the mAs and/or kVp according to patient size, and iterative reconstruction techniques. The specific techniques used on this CT exam have been documented in the patient's electronic medical record. Digital Imaging and Communications in Medicine (DICOM) format image data are available to nonaffiliated external healthcare facilities or entities on a secure, media free, reciprocally searchable basis with patient authorization for at least a 12-month period after this study.  _______________ FINDINGS: BRAIN:   There is some prominence of sulci consistent with atrophy most prominent in the perisylvian region. The atrophy has probably progressed in the interval. Ventricular system is mildly prominent also larger in the interval. A single sulcus is prominent in the high right parietal region which could represent an arachnoid cyst in part, unchanged. The brainstem particularly the midbrain is substantially atrophic with a small kate and midbrain. Cerebellum is not particularly atrophic. There is no intracranial hemorrhage, mass effect, or midline shift. No definite new focal brain lesion. No hypodensity in cortex would be consistent with an acute cortical infarction. EXTRA-AXIAL SPACES AND MENINGES: There are no abnormal extra-axial fluid collections or mass. CALVARIUM: Intact. OTHER: Bilateral cataract surgery. _______________     IMPRESSION: 1No acute intracranial abnormalities. No hemorrhage, mass effect or CT evident acute cortical infarction. 2. Supratentorial atrophy as described. Progression of supratentorial loss of brain volume. 3. Substantial brainstem atrophy, as noted previously. No step definite CT correlate to the bilateral middle cerebellar peduncle abnormality noted on previous FLAIR sequence. Cerebellum is not as atrophic as the kate. Ct Spine Lumb Wo Cont    Result Date: 1/7/2021  Examination: CT lumbar spine without contrast. HISTORY: Patient with increased weakness in bilateral lower extremities, progressive over several days. TECHNIQUE: CT imaging of the lumbar spine was performed in the axial plane utilizing both bone and soft tissue reconstruction algorithms. Standard coronal and sagittal reformatted images were performed by the technologist and are included in interpretation. One or more dose reduction techniques were used on this CT: automated exposure control, adjustment of the mAs and/or kVp according to patient size, and iterative reconstruction techniques.   The specific techniques used on this CT exam have been documented in the patient's electronic medical record. Digital Imaging and Communications in Medicine (DICOM) format image data are available to nonaffiliated external healthcare facilities or entities on a secure, media free, reciprocally searchable basis with patient authorization for at least a 12-month period after this study. COMPARISON: No relevant prior imaging is available for review or comparison. FINDINGS: There is mild dextrorotatory curvature of the thoracolumbar junction demonstrated. Sagittal reformatted imaging demonstrates mild anterolisthesis of L5 on S1. There are postoperative changes from posterior instrumented spinal fusion extending from L2-S1. Interlaminar fixation cages are noted at L2-L3, L3-L4, L4-L5, and L5-S1. Anterior plate and screw construct across the L5-S1 level is present. There are paired pedicular screws present at the posteriorly decompressed/fused levels. Slight medial position of the right L2 pedicle screw noted. L3-L5 pedicle screws appear centered within the respective pedicles. Minor anterior cortical penetration of the right S1 body screw noted. Vertebral body heights appear preserved. No compression fracture demonstrated. Sacrum is intact. Mild bilateral SI joint osteoarthritis. No suspicious appearing lytic or blastic osseous lesion. Secondary to beam hardening artifact incurred from the indwelling spinal instrumentation, contents of the spinal canal are not well assessed by this examination. Prominent disc osteophyte at the L2-L3 level noted with likely ventral impression of thecal sac; however detail is limited. No high-grade neuroforaminal stenosis demonstrated. Review retroperitoneal soft tissue structures demonstrates relatively diffuse aortobiiliac atherosclerotic vascular calcification without evidence of aneurysmal dilatation. No adenopathy or free pelvic fluid. IMPRESSION: 1.  Postoperative changes from posterior instrumented spinal fusion, decompression, and interlaminar fixation L2-S1 without acute osseous abnormality demonstrated. 2. Limited assessment of the contents of the spinal canal secondary to extensive artifact from indwelling spinal instrumentation. No definite findings on this examination to suggest a high-grade osseous canal stenosis with the majority of the lumbar spine posteriorly decompressed. 3. No high-grade neural foraminal stenosis. Ct Spine Lumb W Cont    Result Date: 1/7/2021  EXAM: LUMBAR SPINE MYELOGRAM AND POSTMYELOGRAM LUMBAR SPINE CT INDICATION: Bilateral lower extremity weakness, severe low back pain, prior lumbar fusion, possible cauda equina syndrome, cardiac pacing device unable to undergo MRI COMPARISON: Noncontrast CT lumbar spine earlier the same day TECHNIQUE: Written informed consent was obtained from the patient after thorough explanation of the procedure, risks, and benefits. The patient was placed prone upon the fluoroscopy table. The patient's back was prepped and draped in the normal sterile fashion. 1% lidocaine buffered with sodium bicarbonate was used for local anesthetic. Next using fluoroscopic guidance, a 22 gauge three and half-inch spinal needle was advanced into the thecal sac at the L3 level through laminectomy defect. Immediate CSF return was seen. Then approximately 15 cc of Isovue 200-M contrast was slowly instilled into the thecal sac under intermittent fluoroscopic observation. The stylet was replaced. The needle was removed. Hemostasis was achieved. There were no immediate complications. Multiple routine digital fluoroscopic images were then obtained. Patient was unable to tolerate upright imaging due to leg weakness. The patient was sent to the CT suite for a postmyelogram lumbar spine CT. Multiple axial CT images of the lumbar spine were performed. Sagittal and coronal reconstructions were also performed.   One or more dose reduction techniques were used on this CT: automated exposure control, adjustment of the mAs and/or kVp according to patient's size, and iterative reconstruction techniques. The specific techniques utilized on this CT exam have been documented in the patient's electronic medical record. Digital Imaging and Communications in Medicine (DICOM) format image data are available to nonaffiliated external healthcare facilities or entities on a secure, media free, reciprocally searchable basis with patient authorization for at least a 12-month period after this study. Fluoroscopy radiation dose (reference air kerma), in mGy: 56.50 GUIDANCE: Fluoroscopic guidance was used to position (and confirm the position of) the spinal needle. Image(s) saved in PACS: Fluoroscopy ____________________ FINDINGS: LUMBAR MYELOGRAM The patient has previously undergone spinal fusion of the L2-S1 levels with bipedicular screws and fusion rods. Accompanying laminectomy changes are present at L2-L5 levels. Additional interbody graft fusion has been performed of each of the disc levels. The L5-S1 interbody graft is from an anterior approach with interlocking screws. Slight L1-L2 retrolisthesis is present. Of note, upright imaging not able to be obtained. Patient had limited mobility on the table limiting overall myelographic evaluation. No high-grade central stenosis is seen. No hardware failure is seen. Lucency is associated with the right L2 pedicle screw exuberant paraspinal calcification/osteophyte is present at the L1-L2 junctional level. Please refer to the post-myelogram CT findings below for further details. POSTMYELOGRAM LUMBAR SPINE CT Post surgical changes from L2-S1 spinal fusion are again with bipedicular screws and interlocking fusion rods. Additional interbody graft fusion is present at each of the 4 disc levels with anterior approach for the L5-S1 interbody graft. Accompanying laminectomy changes are seen. Slight L1-L2 retrolisthesis is present.  There is abnormal hypodensity associated with the superior lateral aspect of the L2 vertebral body with a somewhat oblique configuration. Slight depression of the superior endplate is present. This hypodensity represents a fracture line and extends to the superior margin of the pedicle screw and further posteriorly through the right pedicle through the osseous graft fusion material. Adjacent hypodensity is present partially surrounding this right L2 pedicle screw. There may be an additional incomplete fracture line through the inferior aspect of the L1 spinous process. There is prominent curvilinear calcification representing callus formation and/or developing osteophyte along the lateral margins of this L1-2 disc space. No solid bridging bone is present. Other vertebral body heights are preserved. The conus medullaris is located at the L1-L2 level and has a normal appearance. No distal cord or conus impingement is seen. T12-L1 level: There is no central or foraminal stenosis. L1/2 level: Disc bulge is present slightly uncovered by the retrolisthesis. No central stenosis is present. Mild foraminal stenosis is present. L2/3 level: This level is a prior surgical fusion level. Thickened calcification is present along the posterior margin of the disc, perhaps representing ossification of posterior longitudinal ligament or osseous graft fusion material, unchanged. Minimal foraminal narrowing is present. No central stenosis is present. L3/4 level: This level is a prior surgical fusion level. There is no central or foraminal stenosis. L4/5 level: This level is a prior surgical fusion level. There is no central or foraminal stenosis. L5/S1 level: This level is a prior surgical fusion level. Hypertrophic changes produce moderate bilateral foraminal stenosis. No central stenosis is present. Partially visualized prostate is enlarged and impresses upon the base of the urinary bladder. There is abnormal bladder wall thickening present.  Small hypodense outpouchings are seen laterally suggesting bladder diverticula. Atherosclerotic calcifications are present. ____________________     IMPRESSION: 1. Postsurgical changes from prior L2-S1 instrument fusion with bipedicular screws and interlocking fusion rods as well as interbody graft fusion placement at each of the fusion levels including anterior approach L5-S1 grafts with interlocking screws. Accompanying laminectomies are seen. -No central stenosis at the postsurgical levels. No impingement upon the distal cord/conus. 2. Fracture involving the superior lateral aspect of the L2 vertebral body extending along the right L2 screw through the right pedicle. Fracture age is strictly indeterminate but suspect subacute. Prominent paraspinal callus formation and developing osteophyte. 3. Slight L1-L2 retrolisthesis without short-term interval change. Patient unable to tolerate upright or flexion/extension positioning for further evaluation. 4. No additional fracture is seen. 5. No high-grade central or foraminal stenosis. Additional mild multilevel degenerative changes throughout the lumbar spine , as described in detail in Findings section. 6. Abnormal bladder wall thickening which may reflect bladder outlet obstruction and/or cystitis. Associated small bilateral bladder diverticula. Xr Chest Port    Result Date: 1/7/2021  EXAM: XR CHEST PORT CLINICAL INDICATION/HISTORY: sepsis   > Additional: None. COMPARISON: June 17, 2014 TECHNIQUE: Portable chest _______________ FINDINGS: SUPPORT DEVICES: Left subclavian approach cardiac pacer device is now seen with leads projecting over the right atrium, right ventricle, and coronary sinus. HEART AND MEDIASTINUM: Normal size and contour. Normal pulmonary vasculature. LUNGS AND PLEURAL SPACES: The lungs are well expanded and clear. No focal consolidation, effusion, or pneumothorax. BONY THORAX AND SOFT TISSUES: Left shoulder arthroplasty hardware is noted with degenerative changes present in both acromioclavicular joints. _______________     IMPRESSION: No active cardiopulmonary disease. ASSESSMENT/IMPRESSION:   62-year-old male with past medical history of chronic spinal stenosis status post surgery presents with 2-day history of bilateral foot pain and weakness after falling from 13 stairs 2 days prior. Lumbar spine myelogram was finished and ruled out spinal stenosis except but vertebral body fracture. 1. Bilateral lower limb weakness:  Weakness is limited by pain from recent vertebral body fracture. CSF study was unremarkable, with elevated RBC and protein, which is also related to recent vertebral fracture. His weakness improves this morning with improving of pain.     RECOMMENDATIONS  1. I discussed with patient and his wife at bedside of my impression. We will increase baclofen to 5 mg 2-3 times daily if tolerable. If patient has worsening weakness despite of current symptomatic treatment, she should bring him back to hospital for further evaluation.     Neurology sign off, please do not hesitate to return with questions. Signed:   Virginia Aquino MD  1/10/2021  12:04 PM

## 2021-01-10 NOTE — PROGRESS NOTES
Hospitalist Progress Note-critical care note     Patient: Tayler Valle MRN: [id number]  CSN: [id number]    YOB: 1940  Age: 68 y.o. Sex: male    DOA: 1/7/2021 LOS:  LOS: 3 days            Chief complaint: rhabdo , uti , elevated cr, bilateral weakness     Assessment/Plan         Hospital Problems  Date Reviewed: 7/7/2014          Codes Class Noted POA    Traumatic rhabdomyolysis ([facility] [address] ICD-10-CM: T79. 6XXA  ICD-9-CM: 958.6  1/8/2021 Unknown        UTI (urinary tract infection) ICD-10-CM: N39.0  ICD-9-CM: 599.0  1/7/2021 Unknown        * (Principal) Weakness of both legs ICD-10-CM: R29.898  ICD-9-CM: 729.89  1/7/2021 Unknown        Status cardiac pacemaker ICD-10-CM: Z95.0  ICD-9-CM: V45.01  1/7/2021 Unknown        Elevated serum creatinine ICD-10-CM: R79.89  ICD-9-CM: 790.99  1/7/2021 Unknown        Fall at home ICD-10-CM: [address] [place], [place]  ICD-9-CM: E888.9, E849.0  1/7/2021 Unknown        HTN (hypertension) ICD-10-CM: I10  ICD-9-CM: 401.9  4/16/2013 Yes              Weakness    myelo gram per IR done, no high stenosis , L2 fracture -not sure age    appreciate ortho input not surgicall   neuro consult appreciated post LP  Likely from Trauma no GBS  Will also check ankle xray and gout     Ankle pain and welling   Check xray   Mild erytema noted change to zosyn    UTI  Culture positive for psedomonas  Change to zosyn    Rhabd   Due to recent fall   Continue iv hydration   Ck monitoring improved by half     Elevated cr -mild improving   Ns infusion jannette increase today concern for chf restart lasix once improved   Continue monitoring cr     Cad so far stable      Pacemaker   Will remain electrolytes balance   No MRI due to this veriifed with MRI        Fall at home   Fall precaution   PT eval       HTN, accelerated  Hold meds for now due to border line bp   Restart as needed     Subjective: feel fine , pain is controlled , I had L2 fracture before   Rn: try to reach ortho      Disposition :tbd, pending PT and Neuro consult  Review of systems:    General: No fevers or chills. Cardiovascular: No chest pain or pressure. No palpitations. Pulmonary: No shortness of breath. Gastrointestinal: No nausea, vomiting. Msk: leg weakness     Vital signs/Intake and Output:  Visit Vitals  /63 (BP 1 Location: Left arm, BP Patient Position: At rest)   Pulse 90   Temp 99.3 °F (37.4 °C)   Resp 16   Ht 5' 10\" (1.778 m)   Wt 94.3 kg (208 lb)   SpO2 98%   BMI 29.84 kg/m²     Current Shift:  No intake/output data recorded. Last three shifts:  01/08 1901 - 01/10 0700  In: 1233.8 [P.O.:480; I.V.:753.8]  Out: 0     Physical Exam:  General: WD, WN. Alert, cooperative, no acute distress    HEENT: NC, Atraumatic. PERRLA, anicteric sclerae. Lungs: CTA Bilaterally. No Wheezing/Rhonchi/Rales. Heart:  Regular  rhythm,  No murmur, No Rubs, No Gallops  Abdomen: Soft, Non distended, Non tender. +Bowel sounds,   Extremities: No c/c/e  Psych:   Not anxious or agitated. Neurologic:  No acute neurological deficit except bilateral LE weakness ,sensation same           Labs: Results:       Chemistry Recent Labs     01/10/21  0237 01/09/21  0205 01/08/21  0120   * 125* 125*    138 139   K 4.2 4.2 4.3    105 103   CO2 23 22 24   BUN 30* 35* 38*   CREA 1.32* 1.28 1.55*   CA 8.6 8.3* 8.8   AGAP 9 11 12   BUCR 23* 27* 25*      CBC w/Diff Recent Labs     01/10/21  0237 01/09/21  0205 01/08/21  0120   WBC 12.5 12.2 13.7*   RBC 3.92* 3.68* 3.94*   HGB 11.4* 10.9* 11.7*   HCT 35.7* 33.5* 36.0    218 242   GRANS 80* 80* 84*   LYMPH 9* 10* 8*   EOS 1 0 0      Cardiac Enzymes Recent Labs     01/10/21  0237 01/09/21  0205   CPK 1,682* 2,326*      Coagulation No results for input(s): PTP, INR, APTT, INREXT, INREXT in the last 72 hours.     Lipid Panel No results found for: CHOL, CHOLPOCT, CHOLX, CHLST, CHOLV, 141606, HDL, HDLP, LDL, LDLC, DLDLP, 427788, VLDLC, VLDL, TGLX, TRIGL, TRIGP, TGLPOCT, CHHD, CHHDX   BNP No results for input(s): BNPP in the last 72 hours. Liver Enzymes No results for input(s): TP, ALB, TBIL, AP in the last 72 hours. No lab exists for component: SGOT, GPT, DBIL   Thyroid Studies No results found for: T4, T3U, TSH, TSHEXT, TSHEXT     Procedures/imaging: see electronic medical records for all procedures/Xrays and details which were not copied into this note but were reviewed prior to creation of Plan    Xr Myelo Lumbar    Result Date: 1/7/2021  EXAM: LUMBAR SPINE MYELOGRAM AND POSTMYELOGRAM LUMBAR SPINE CT INDICATION: Bilateral lower extremity weakness, severe low back pain, prior lumbar fusion, possible cauda equina syndrome, cardiac pacing device unable to undergo MRI COMPARISON: Noncontrast CT lumbar spine earlier the same day TECHNIQUE: Written informed consent was obtained from the patient after thorough explanation of the procedure, risks, and benefits. The patient was placed prone upon the fluoroscopy table. The patient's back was prepped and draped in the normal sterile fashion. 1% lidocaine buffered with sodium bicarbonate was used for local anesthetic. Next using fluoroscopic guidance, a 22 gauge three and half-inch spinal needle was advanced into the thecal sac at the L3 level through laminectomy defect. Immediate CSF return was seen. Then approximately 15 cc of Isovue 200-M contrast was slowly instilled into the thecal sac under intermittent fluoroscopic observation. The stylet was replaced. The needle was removed. Hemostasis was achieved. There were no immediate complications. Multiple routine digital fluoroscopic images were then obtained. Patient was unable to tolerate upright imaging due to leg weakness. The patient was sent to the CT suite for a postmyelogram lumbar spine CT. Multiple axial CT images of the lumbar spine were performed. Sagittal and coronal reconstructions were also performed.   One or more dose reduction techniques were used on this CT: automated exposure control, adjustment of the mAs and/or kVp according to patient's size, and iterative reconstruction techniques. The specific techniques utilized on this CT exam have been documented in the patient's electronic medical record. Digital Imaging and Communications in Medicine (DICOM) format image data are available to nonaffiliated external healthcare facilities or entities on a secure, media free, reciprocally searchable basis with patient authorization for at least a 12-month period after this study. Fluoroscopy radiation dose (reference air kerma), in mGy: 56.50 GUIDANCE: Fluoroscopic guidance was used to position (and confirm the position of) the spinal needle. Image(s) saved in PACS: Fluoroscopy ____________________ FINDINGS: LUMBAR MYELOGRAM The patient has previously undergone spinal fusion of the L2-S1 levels with bipedicular screws and fusion rods. Accompanying laminectomy changes are present at L2-L5 levels. Additional interbody graft fusion has been performed of each of the disc levels. The L5-S1 interbody graft is from an anterior approach with interlocking screws. Slight L1-L2 retrolisthesis is present. Of note, upright imaging not able to be obtained. Patient had limited mobility on the table limiting overall myelographic evaluation. No high-grade central stenosis is seen. No hardware failure is seen. Lucency is associated with the right L2 pedicle screw exuberant paraspinal calcification/osteophyte is present at the L1-L2 junctional level. Please refer to the post-myelogram CT findings below for further details. POSTMYELOGRAM LUMBAR SPINE CT Post surgical changes from L2-S1 spinal fusion are again with bipedicular screws and interlocking fusion rods. Additional interbody graft fusion is present at each of the 4 disc levels with anterior approach for the L5-S1 interbody graft. Accompanying laminectomy changes are seen. Slight L1-L2 retrolisthesis is present.  There is abnormal hypodensity associated with the superior lateral aspect of the L2 vertebral body with a somewhat oblique configuration. Slight depression of the superior endplate is present. This hypodensity represents a fracture line and extends to the superior margin of the pedicle screw and further posteriorly through the right pedicle through the osseous graft fusion material. Adjacent hypodensity is present partially surrounding this right L2 pedicle screw. There may be an additional incomplete fracture line through the inferior aspect of the L1 spinous process. There is prominent curvilinear calcification representing callus formation and/or developing osteophyte along the lateral margins of this L1-2 disc space. No solid bridging bone is present. Other vertebral body heights are preserved. The conus medullaris is located at the L1-L2 level and has a normal appearance. No distal cord or conus impingement is seen. T12-L1 level: There is no central or foraminal stenosis. L1/2 level: Disc bulge is present slightly uncovered by the retrolisthesis. No central stenosis is present. Mild foraminal stenosis is present. L2/3 level: This level is a prior surgical fusion level. Thickened calcification is present along the posterior margin of the disc, perhaps representing ossification of posterior longitudinal ligament or osseous graft fusion material, unchanged. Minimal foraminal narrowing is present. No central stenosis is present. L3/4 level: This level is a prior surgical fusion level. There is no central or foraminal stenosis. L4/5 level: This level is a prior surgical fusion level. There is no central or foraminal stenosis. L5/S1 level: This level is a prior surgical fusion level. Hypertrophic changes produce moderate bilateral foraminal stenosis. No central stenosis is present. Partially visualized prostate is enlarged and impresses upon the base of the urinary bladder. There is abnormal bladder wall thickening present.  Small hypodense outpouchings are seen laterally suggesting bladder diverticula. Atherosclerotic calcifications are present. ____________________     IMPRESSION: 1. Postsurgical changes from prior L2-S1 instrument fusion with bipedicular screws and interlocking fusion rods as well as interbody graft fusion placement at each of the fusion levels including anterior approach L5-S1 grafts with interlocking screws. Accompanying laminectomies are seen. -No central stenosis at the postsurgical levels. No impingement upon the distal cord/conus. 2. Fracture involving the superior lateral aspect of the L2 vertebral body extending along the right L2 screw through the right pedicle. Fracture age is strictly indeterminate but suspect subacute. Prominent paraspinal callus formation and developing osteophyte. 3. Slight L1-L2 retrolisthesis without short-term interval change. Patient unable to tolerate upright or flexion/extension positioning for further evaluation. 4. No additional fracture is seen. 5. No high-grade central or foraminal stenosis. Additional mild multilevel degenerative changes throughout the lumbar spine , as described in detail in Findings section. 6. Abnormal bladder wall thickening which may reflect bladder outlet obstruction and/or cystitis. Associated small bilateral bladder diverticula. Ct Spine Lumb Wo Cont    Result Date: 1/7/2021  Examination: CT lumbar spine without contrast. HISTORY: Patient with increased weakness in bilateral lower extremities, progressive over several days. TECHNIQUE: CT imaging of the lumbar spine was performed in the axial plane utilizing both bone and soft tissue reconstruction algorithms. Standard coronal and sagittal reformatted images were performed by the technologist and are included in interpretation. One or more dose reduction techniques were used on this CT: automated exposure control, adjustment of the mAs and/or kVp according to patient size, and iterative reconstruction techniques.   The specific techniques used on this CT exam have been documented in the patient's electronic medical record. Digital Imaging and Communications in Medicine (DICOM) format image data are available to nonaffiliated external healthcare facilities or entities on a secure, media free, reciprocally searchable basis with patient authorization for at least a 12-month period after this study. COMPARISON: No relevant prior imaging is available for review or comparison. FINDINGS: There is mild dextrorotatory curvature of the thoracolumbar junction demonstrated. Sagittal reformatted imaging demonstrates mild anterolisthesis of L5 on S1. There are postoperative changes from posterior instrumented spinal fusion extending from L2-S1. Interlaminar fixation cages are noted at L2-L3, L3-L4, L4-L5, and L5-S1. Anterior plate and screw construct across the L5-S1 level is present. There are paired pedicular screws present at the posteriorly decompressed/fused levels. Slight medial position of the right L2 pedicle screw noted. L3-L5 pedicle screws appear centered within the respective pedicles. Minor anterior cortical penetration of the right S1 body screw noted. Vertebral body heights appear preserved. No compression fracture demonstrated. Sacrum is intact. Mild bilateral SI joint osteoarthritis. No suspicious appearing lytic or blastic osseous lesion. Secondary to beam hardening artifact incurred from the indwelling spinal instrumentation, contents of the spinal canal are not well assessed by this examination. Prominent disc osteophyte at the L2-L3 level noted with likely ventral impression of thecal sac; however detail is limited. No high-grade neuroforaminal stenosis demonstrated. Review retroperitoneal soft tissue structures demonstrates relatively diffuse aortobiiliac atherosclerotic vascular calcification without evidence of aneurysmal dilatation. No adenopathy or free pelvic fluid. IMPRESSION: 1.  Postoperative changes from posterior instrumented spinal fusion, decompression, and interlaminar fixation L2-S1 without acute osseous abnormality demonstrated. 2. Limited assessment of the contents of the spinal canal secondary to extensive artifact from indwelling spinal instrumentation. No definite findings on this examination to suggest a high-grade osseous canal stenosis with the majority of the lumbar spine posteriorly decompressed. 3. No high-grade neural foraminal stenosis. Ct Spine Lumb W Cont    Result Date: 1/7/2021  EXAM: LUMBAR SPINE MYELOGRAM AND POSTMYELOGRAM LUMBAR SPINE CT INDICATION: Bilateral lower extremity weakness, severe low back pain, prior lumbar fusion, possible cauda equina syndrome, cardiac pacing device unable to undergo MRI COMPARISON: Noncontrast CT lumbar spine earlier the same day TECHNIQUE: Written informed consent was obtained from the patient after thorough explanation of the procedure, risks, and benefits. The patient was placed prone upon the fluoroscopy table. The patient's back was prepped and draped in the normal sterile fashion. 1% lidocaine buffered with sodium bicarbonate was used for local anesthetic. Next using fluoroscopic guidance, a 22 gauge three and half-inch spinal needle was advanced into the thecal sac at the L3 level through laminectomy defect. Immediate CSF return was seen. Then approximately 15 cc of Isovue 200-M contrast was slowly instilled into the thecal sac under intermittent fluoroscopic observation. The stylet was replaced. The needle was removed. Hemostasis was achieved. There were no immediate complications. Multiple routine digital fluoroscopic images were then obtained. Patient was unable to tolerate upright imaging due to leg weakness. The patient was sent to the CT suite for a postmyelogram lumbar spine CT. Multiple axial CT images of the lumbar spine were performed. Sagittal and coronal reconstructions were also performed.   One or more dose reduction techniques were used on this CT: automated exposure control, adjustment of the mAs and/or kVp according to patient's size, and iterative reconstruction techniques. The specific techniques utilized on this CT exam have been documented in the patient's electronic medical record. Digital Imaging and Communications in Medicine (DICOM) format image data are available to nonaffiliated external healthcare facilities or entities on a secure, media free, reciprocally searchable basis with patient authorization for at least a 12-month period after this study. Fluoroscopy radiation dose (reference air kerma), in mGy: 56.50 GUIDANCE: Fluoroscopic guidance was used to position (and confirm the position of) the spinal needle. Image(s) saved in PACS: Fluoroscopy ____________________ FINDINGS: LUMBAR MYELOGRAM The patient has previously undergone spinal fusion of the L2-S1 levels with bipedicular screws and fusion rods. Accompanying laminectomy changes are present at L2-L5 levels. Additional interbody graft fusion has been performed of each of the disc levels. The L5-S1 interbody graft is from an anterior approach with interlocking screws. Slight L1-L2 retrolisthesis is present. Of note, upright imaging not able to be obtained. Patient had limited mobility on the table limiting overall myelographic evaluation. No high-grade central stenosis is seen. No hardware failure is seen. Lucency is associated with the right L2 pedicle screw exuberant paraspinal calcification/osteophyte is present at the L1-L2 junctional level. Please refer to the post-myelogram CT findings below for further details. POSTMYELOGRAM LUMBAR SPINE CT Post surgical changes from L2-S1 spinal fusion are again with bipedicular screws and interlocking fusion rods. Additional interbody graft fusion is present at each of the 4 disc levels with anterior approach for the L5-S1 interbody graft. Accompanying laminectomy changes are seen. Slight L1-L2 retrolisthesis is present.  There is abnormal hypodensity associated with the superior lateral aspect of the L2 vertebral body with a somewhat oblique configuration. Slight depression of the superior endplate is present. This hypodensity represents a fracture line and extends to the superior margin of the pedicle screw and further posteriorly through the right pedicle through the osseous graft fusion material. Adjacent hypodensity is present partially surrounding this right L2 pedicle screw. There may be an additional incomplete fracture line through the inferior aspect of the L1 spinous process. There is prominent curvilinear calcification representing callus formation and/or developing osteophyte along the lateral margins of this L1-2 disc space. No solid bridging bone is present. Other vertebral body heights are preserved. The conus medullaris is located at the L1-L2 level and has a normal appearance. No distal cord or conus impingement is seen. T12-L1 level: There is no central or foraminal stenosis. L1/2 level: Disc bulge is present slightly uncovered by the retrolisthesis. No central stenosis is present. Mild foraminal stenosis is present. L2/3 level: This level is a prior surgical fusion level. Thickened calcification is present along the posterior margin of the disc, perhaps representing ossification of posterior longitudinal ligament or osseous graft fusion material, unchanged. Minimal foraminal narrowing is present. No central stenosis is present. L3/4 level: This level is a prior surgical fusion level. There is no central or foraminal stenosis. L4/5 level: This level is a prior surgical fusion level. There is no central or foraminal stenosis. L5/S1 level: This level is a prior surgical fusion level. Hypertrophic changes produce moderate bilateral foraminal stenosis. No central stenosis is present. Partially visualized prostate is enlarged and impresses upon the base of the urinary bladder. There is abnormal bladder wall thickening present. Small hypodense outpouchings are seen laterally suggesting bladder diverticula. Atherosclerotic calcifications are present. ____________________     IMPRESSION: 1. Postsurgical changes from prior L2-S1 instrument fusion with bipedicular screws and interlocking fusion rods as well as interbody graft fusion placement at each of the fusion levels including anterior approach L5-S1 grafts with interlocking screws. Accompanying laminectomies are seen. -No central stenosis at the postsurgical levels. No impingement upon the distal cord/conus. 2. Fracture involving the superior lateral aspect of the L2 vertebral body extending along the right L2 screw through the right pedicle. Fracture age is strictly indeterminate but suspect subacute. Prominent paraspinal callus formation and developing osteophyte. 3. Slight L1-L2 retrolisthesis without short-term interval change. Patient unable to tolerate upright or flexion/extension positioning for further evaluation. 4. No additional fracture is seen. 5. No high-grade central or foraminal stenosis. Additional mild multilevel degenerative changes throughout the lumbar spine , as described in detail in Findings section. 6. Abnormal bladder wall thickening which may reflect bladder outlet obstruction and/or cystitis. Associated small bilateral bladder diverticula. Xr Chest Port    Result Date: 1/7/2021  EXAM: XR CHEST PORT CLINICAL INDICATION/HISTORY: sepsis   > Additional: None. COMPARISON: June 17, 2014 TECHNIQUE: Portable chest _______________ FINDINGS: SUPPORT DEVICES: Left subclavian approach cardiac pacer device is now seen with leads projecting over the right atrium, right ventricle, and coronary sinus. HEART AND MEDIASTINUM: Normal size and contour. Normal pulmonary vasculature. LUNGS AND PLEURAL SPACES: The lungs are well expanded and clear. No focal consolidation, effusion, or pneumothorax.  BONY THORAX AND SOFT TISSUES: Left shoulder arthroplasty hardware is noted with degenerative changes present in both acromioclavicular joints. _______________     IMPRESSION: No active cardiopulmonary disease.

## 2021-01-10 NOTE — PROGRESS NOTES
Pt underwent lumbar puncture yesterday afternoon. Will hold PT today due to recommended bedrest. Will follow up tomorrow if medically appropriate and as pt schedule allows.

## 2021-01-10 NOTE — PROGRESS NOTES
0730- Bedside and Verbal shift change report given to Shorty Bernard RN (oncoming nurse) by STEPHANIE Downey, CAMELIA (offgoing nurse). Report included the following information SBAR, Kardex, Intake/Output and MAR.    1120- Spoke to Dr. Akila Garcia to confirm if she still wanted to keep the patient on IV fluids and swelling of bilat lower extremities. Dr. Akila Garcia verbalized understanding and stated to keep the patient on fluids. 1830- Patient in a deep sleep. I rubbed his arm to wake him up. He woke up and conversed with me normally. 1930- Bedside and Verbal shift change report given to LATRELL Donnelly, CAMELIA (oncoming nurse) by Shorty Bernard RN (offgoing nurse). Report included the following information SBAR, Kardex, Intake/Output and MAR.

## 2021-01-10 NOTE — PROGRESS NOTES
1910: Bedside report received from Colletta Hawks, RN. Assumed care of pt at this time. Pt in bed with no signs of distress. Pt left with call light within reach and encouraged to call for assistance. 2010: Talked to Dr. Naye Jackson regarding patient's home medication and edema in bilateral feet. Patient normally takes Lasix however has not had dose since being admitted. Physician will review chart -- no new orders at this time. 2124: Patient in room -- head of the bed flat after spinal puncture procedure. 0466: Patient can be heard screaming in the hallway. Morphine 2mg given PRN. 1282: Bedside shift change report given to Carole Kimbrough RN (oncoming nurse) by Chris SHERIDAN RN (offgoing nurse). Report included the following information SBAR, Kardex and MAR. Shift Summary: Patient has periodic confusion. Voiding without issue. Edema in bilateral feet -- elevated with pillow.

## 2021-01-11 ENCOUNTER — APPOINTMENT (OUTPATIENT)
Dept: VASCULAR SURGERY | Age: 81
DRG: 565 | End: 2021-01-11
Attending: INTERNAL MEDICINE
Payer: MEDICARE

## 2021-01-11 LAB
ALBUMIN SERPL-MCNC: 2.1 G/DL (ref 3.4–5)
ALBUMIN/GLOB SERPL: 0.5 {RATIO} (ref 0.8–1.7)
ALP SERPL-CCNC: 103 U/L (ref 45–117)
ALT SERPL-CCNC: 75 U/L (ref 16–61)
ANION GAP SERPL CALC-SCNC: 12 MMOL/L (ref 3–18)
AST SERPL-CCNC: 86 U/L (ref 10–38)
BASOPHILS # BLD: 0 K/UL (ref 0–0.1)
BASOPHILS NFR BLD: 0 % (ref 0–2)
BILIRUB SERPL-MCNC: 0.8 MG/DL (ref 0.2–1)
BUN SERPL-MCNC: 26 MG/DL (ref 7–18)
BUN/CREAT SERPL: 21 (ref 12–20)
CALCIUM SERPL-MCNC: 8.3 MG/DL (ref 8.5–10.1)
CHLORIDE SERPL-SCNC: 109 MMOL/L (ref 100–111)
CK SERPL-CCNC: 626 U/L (ref 39–308)
CO2 SERPL-SCNC: 21 MMOL/L (ref 21–32)
CREAT SERPL-MCNC: 1.24 MG/DL (ref 0.6–1.3)
DIFFERENTIAL METHOD BLD: ABNORMAL
EOSINOPHIL # BLD: 0.2 K/UL (ref 0–0.4)
EOSINOPHIL NFR BLD: 2 % (ref 0–5)
ERYTHROCYTE [DISTWIDTH] IN BLOOD BY AUTOMATED COUNT: 16.4 % (ref 11.6–14.5)
GLOBULIN SER CALC-MCNC: 4.1 G/DL (ref 2–4)
GLUCOSE SERPL-MCNC: 104 MG/DL (ref 74–99)
HCT VFR BLD AUTO: 35.6 % (ref 36–48)
HGB BLD-MCNC: 11.6 G/DL (ref 13–16)
LYMPHOCYTES # BLD: 1.1 K/UL (ref 0.9–3.6)
LYMPHOCYTES NFR BLD: 8 % (ref 21–52)
MAGNESIUM SERPL-MCNC: 2.6 MG/DL (ref 1.6–2.6)
MCH RBC QN AUTO: 30.1 PG (ref 24–34)
MCHC RBC AUTO-ENTMCNC: 32.6 G/DL (ref 31–37)
MCV RBC AUTO: 92.2 FL (ref 74–97)
MONOCYTES # BLD: 1 K/UL (ref 0.05–1.2)
MONOCYTES NFR BLD: 8 % (ref 3–10)
NEUTS SEG # BLD: 11.4 K/UL (ref 1.8–8)
NEUTS SEG NFR BLD: 82 % (ref 40–73)
PLATELET # BLD AUTO: 224 K/UL (ref 135–420)
PMV BLD AUTO: 9.5 FL (ref 9.2–11.8)
POTASSIUM SERPL-SCNC: 4.2 MMOL/L (ref 3.5–5.5)
PROT SERPL-MCNC: 6.2 G/DL (ref 6.4–8.2)
RBC # BLD AUTO: 3.86 M/UL (ref 4.7–5.5)
SODIUM SERPL-SCNC: 142 MMOL/L (ref 136–145)
URATE SERPL-MCNC: 5.8 MG/DL (ref 2.6–7.2)
WBC # BLD AUTO: 13.8 K/UL (ref 4.6–13.2)

## 2021-01-11 PROCEDURE — 97535 SELF CARE MNGMENT TRAINING: CPT

## 2021-01-11 PROCEDURE — 65270000029 HC RM PRIVATE

## 2021-01-11 PROCEDURE — 74011250637 HC RX REV CODE- 250/637: Performed by: HOSPITALIST

## 2021-01-11 PROCEDURE — 82550 ASSAY OF CK (CPK): CPT

## 2021-01-11 PROCEDURE — 74011250637 HC RX REV CODE- 250/637: Performed by: ORTHOPAEDIC SURGERY

## 2021-01-11 PROCEDURE — 85025 COMPLETE CBC W/AUTO DIFF WBC: CPT

## 2021-01-11 PROCEDURE — 97530 THERAPEUTIC ACTIVITIES: CPT

## 2021-01-11 PROCEDURE — 97110 THERAPEUTIC EXERCISES: CPT

## 2021-01-11 PROCEDURE — 84550 ASSAY OF BLOOD/URIC ACID: CPT

## 2021-01-11 PROCEDURE — 36415 COLL VENOUS BLD VENIPUNCTURE: CPT

## 2021-01-11 PROCEDURE — 74011250636 HC RX REV CODE- 250/636: Performed by: INTERNAL MEDICINE

## 2021-01-11 PROCEDURE — 74011000258 HC RX REV CODE- 258: Performed by: INTERNAL MEDICINE

## 2021-01-11 PROCEDURE — 83735 ASSAY OF MAGNESIUM: CPT

## 2021-01-11 PROCEDURE — 97167 OT EVAL HIGH COMPLEX 60 MIN: CPT

## 2021-01-11 PROCEDURE — 77030040392 HC DRSG OPTIFOAM MDII -A

## 2021-01-11 PROCEDURE — 93970 EXTREMITY STUDY: CPT

## 2021-01-11 PROCEDURE — 74011250637 HC RX REV CODE- 250/637: Performed by: PSYCHIATRY & NEUROLOGY

## 2021-01-11 PROCEDURE — 80053 COMPREHEN METABOLIC PANEL: CPT

## 2021-01-11 RX ADMIN — METHYLPREDNISOLONE SODIUM SUCCINATE 40 MG: 40 INJECTION, POWDER, FOR SOLUTION INTRAMUSCULAR; INTRAVENOUS at 13:16

## 2021-01-11 RX ADMIN — PIPERACILLIN AND TAZOBACTAM 3.38 G: 3; .375 INJECTION, POWDER, LYOPHILIZED, FOR SOLUTION INTRAVENOUS at 17:47

## 2021-01-11 RX ADMIN — METHYLPREDNISOLONE SODIUM SUCCINATE 40 MG: 40 INJECTION, POWDER, FOR SOLUTION INTRAMUSCULAR; INTRAVENOUS at 17:47

## 2021-01-11 RX ADMIN — DIAZEPAM 2.5 MG: 5 TABLET ORAL at 13:15

## 2021-01-11 RX ADMIN — Medication 10 ML: at 06:12

## 2021-01-11 RX ADMIN — PIPERACILLIN AND TAZOBACTAM 3.38 G: 3; .375 INJECTION, POWDER, LYOPHILIZED, FOR SOLUTION INTRAVENOUS at 06:12

## 2021-01-11 RX ADMIN — Medication 10 ML: at 17:48

## 2021-01-11 RX ADMIN — DOCUSATE SODIUM 100 MG: 100 CAPSULE, LIQUID FILLED ORAL at 09:08

## 2021-01-11 RX ADMIN — PIPERACILLIN AND TAZOBACTAM 3.38 G: 3; .375 INJECTION, POWDER, LYOPHILIZED, FOR SOLUTION INTRAVENOUS at 13:16

## 2021-01-11 RX ADMIN — BACLOFEN 5 MG: 10 TABLET ORAL at 16:48

## 2021-01-11 RX ADMIN — BACLOFEN 5 MG: 10 TABLET ORAL at 09:08

## 2021-01-11 NOTE — PROGRESS NOTES
1927- Verbal shift change report given to DEANDRE Butt RN (oncoming nurse) by Darleen Machado RN (offgoing nurse). Report included the following information SBAR, Kardex, Intake/Output, MAR and Recent Results. 2025- Assessment complete. VSS. Alert and oriented to person, place, and situation. Disoriented to time. Periodic episodes of confusion in conversation. Linens changed and incontinence care completed. Pt instructed to call nursing for assistance before getting out of bed, verbalizes understanding. All needs addressed at this time. Bed in lowest position. Call bell within reach. 2130- Pt resting comfortable supine in bed, awake and alert. No needs expressed at this time. Bed in lowest position. Call bell within reach. 2344- Reassessment complete, no change to previous assessment. VSS. No needs expressed at this time. Bed in lowest position. Call bell within reach. 0400- Reassessment complete, no change to previous assessment. VSS. No needs expressed at this time. Bed in lowest position. Call bell within reach. 0730- Verbal shift change report given to Michelle Dong RN (oncoming nurse) by Randi Asher RN (offgoing nurse). Report included the following information SBAR, Kardex, Intake/Output, MAR and Recent Results. Opportunities for questions was provided.      Patient Vitals for the past 12 hrs:   Temp Pulse Resp BP SpO2   01/11/21 0400 98.5 °F (36.9 °C)  22 134/69 97 %   01/10/21 2344 98.6 °F (37 °C)  21 129/72 100 %   01/10/21 2028 98.9 °F (37.2 °C) 89 19 121/69 100 %

## 2021-01-11 NOTE — PROGRESS NOTES
Occupational Therapy Screening:  Services are indicated. Evaluate and Treat Order is requested. [facility] therapy based on results obtained during the nursing admission assessment. The patients chart was reviewed and the patient is appropriate for a skilled therapy evaluation. Patient was admitted with BLE weakness s/p fall down 13 stairs. Please order a consult for occupational therapy if you are in agreement and would like an evaluation to be completed. Thank you.   Ciarra Benitez, OTR/L, CSRS, CDCS, [place]

## 2021-01-11 NOTE — PROGRESS NOTES
Problem: Pain  Goal: *Control of Pain  Outcome: Progressing Towards Goal     Problem: Patient Education: Go to Patient Education Activity  Goal: Patient/Family Education  Outcome: Progressing Towards Goal     Problem: Urinary Tract Infection - Adult  Goal: *Absence of infection signs and symptoms  Outcome: Progressing Towards Goal     Problem: Falls - Risk of  Goal: *Absence of Falls  Description: Document Benja Fall Risk and appropriate interventions in the flowsheet. Outcome: Progressing Towards Goal     Problem: Pressure Injury - Risk of  Goal: *Prevention of pressure injury  Description: Document Romero Scale and appropriate interventions in the flowsheet.   Outcome: Progressing Towards Goal

## 2021-01-11 NOTE — PROGRESS NOTES
Problem: Mobility Impaired (Adult and Pediatric)  Goal: *Acute Goals and Plan of Care (Insert Text)  Description: Physical Therapy Goals  Initiated 1/9/2021 and to be accomplished within 3-7 day(s)  1. Patient will move from supine to sit and sit to supine  and roll side to side in bed with minimal assistance/contact guard assist.    2.  Patient will transfer from bed to chair and chair to bed with moderate assistance  using the least restrictive device. 3.  Patient will perform sit to stand with maximal assistance. Will add amb goals when appropriate   Outcome: Progressing Towards Goal  PHYSICAL THERAPY TREATMENT    Patient: Jeff Blanca (33 y.o. male)  Date: 1/11/2021  Diagnosis: UTI (urinary tract infection) [N39.0] Weakness of both legs  Precautions: Fall   Chart, physical therapy assessment, plan of care and goals were reviewed. ASSESSMENT:  Pt found supine in bed. Doppler results for LE's negative. Pt willing to participate with therapy session; participation with functional mobility activity limited by low back and R foot pain (foot edematous). ROM/strengthening ex completed as noted below. Fair/fair - tolerance for bed mobility activity. Requires max A of 1 to roll toward R and Ax2 for rolling Left due to c/o pain. Pt also seen by wound care nurse Augustine Kolb during PT session. Pt completed rolling activity again for skin assessment with assist as noted above. At this time, highly recommend rehab prior to pt return home at discharge. Note pt reports h/o sliding down steps face forward during fall from top of steps to bottom of steps when he lost his balance. Pt with h/o back surgery in June 2020, followed by rehab x~5wks per spouse report and ~8wks of HHPT.   Progression toward goals:  []      Improving appropriately and progressing toward goals  [x]      Improving slowly and progressing toward goals  []      Not making progress toward goals and plan of care will be adjusted     PLAN:  Patient continues to benefit from skilled intervention to address the above impairments. Continue treatment per established plan of care. Discharge Recommendations:  Rehab  Further Equipment Recommendations for Discharge:  N/A     SUBJECTIVE:   Patient stated I just got back from downstairs.     OBJECTIVE DATA SUMMARY:   Critical Behavior:  Neurologic State: Alert, Confused  Orientation Level: Disoriented to time, Oriented to person, Oriented to place, Oriented to situation  Cognition: Follows commands  Functional Mobility Training:  Bed Mobility:  Rolling: Maximum assistance;Assist x1;Assist x2  Therapeutic Exercises:       EXERCISE   Sets   Reps   Active Active Assist   Passive Self ROM   Comments   Ankle Pumps 1 10  [x] [x] [] [] AA R   Quad Sets/Glut Sets 1 10 [x] [] [] []    Heel Slides 1 10 [] [x] [] [] AA Bilat   Hip Abd/Add 1 10 [x] [x] [] [] AA R   BUE Shd elev. 2 10 [x] [x] [] [] AA R   Seated Marching   [] [] [] []    Standing Marching   [] [] [] []       [] [] [] []      Pain:  Pain Scale 1: Numeric (0 - 10)  Pain Intensity 1: 6  Pain Location 1: Leg  Pain Orientation 1: Lower;Right;Left  Pain Description 1: Aching;Burning;Constant  Activity Tolerance:   Fair/fair-   Please refer to the flowsheet for vital signs taken during this treatment.   After treatment:   [] Patient left in no apparent distress sitting up in chair  [x] Patient left in no apparent distress in bed  [x] Call bell left within reach  [] Nursing notified  [x] Caregiver present  [] Bed alarm activated      Bradley Lynch PT   Time Calculation: 50 mins

## 2021-01-11 NOTE — PROGRESS NOTES
Hospitalist Progress Note-critical care note     Patient: Shaun Beckman MRN: [id number]  CSN: [id number]    YOB: 1940  Age: 68 y.o. Sex: male    DOA: 1/7/2021 LOS:  LOS: 4 days            Chief complaint: rhabdo , uti , elevated cr, bilateral weakness     Assessment/Plan         Hospital Problems  Date Reviewed: 7/7/2014          Codes Class Noted POA    Traumatic rhabdomyolysis ([facility] [address] ICD-10-CM: T79. 6XXA  ICD-9-CM: 958.6  1/8/2021 Unknown        UTI (urinary tract infection) ICD-10-CM: N39.0  ICD-9-CM: 599.0  1/7/2021 Unknown        * (Principal) Weakness of both legs ICD-10-CM: R29.898  ICD-9-CM: 729.89  1/7/2021 Unknown        Status cardiac pacemaker ICD-10-CM: Z95.0  ICD-9-CM: V45.01  1/7/2021 Unknown        Elevated serum creatinine ICD-10-CM: R79.89  ICD-9-CM: 790.99  1/7/2021 Unknown        Fall at home ICD-10-CM: [address] [place], [place]  ICD-9-CM: E888.9, E849.0  1/7/2021 Unknown        HTN (hypertension) ICD-10-CM: I10  ICD-9-CM: 401.9  4/16/2013 Yes              Weakness    myelo gram per IR done, no high stenosis , L2 fracture -not sure age    appreciate ortho input not surgicall   neuro consult appreciated post LP  Likely from Trauma no GBS  Ankle foot xrays no fracture just soft tissue swellling  Start low dose steroids for inflamation     Ankle pain and welling   Check xray   Mild erytema noted change to zosyn  Start low dose steroids for gout     UTI  Culture positive for psedomonas  Change to zosyn    Rhabd   Due to recent fall   Continue iv hydration but will drop and start lasix today   Ck monitoring improved by half     Elevated cr -mild improving   Ns infusion jannette increase today concern for chf restart lasix once improved   Continue monitoring cr     Cad so far stable      Pacemaker   Will remain electrolytes balance   No MRI due to this veriifed with MRI        Fall at home   Fall precaution   PT eval       HTN, accelerated  Hold meds for now due to border line bp   Restart as needed     Subjective: feel fine , pain is controlled , I had L2 fracture before   My ankle hurts I think my gout has flared up        Disposition :tbd, pending PT  Review of systems:    General: No fevers or chills. Cardiovascular: No chest pain or pressure. No palpitations. Pulmonary: No shortness of breath. Gastrointestinal: No nausea, vomiting. Msk: leg weakness     Vital signs/Intake and Output:  Visit Vitals  /69   Pulse 89   Temp 98.5 °F (36.9 °C)   Resp 22   Ht 5' 10\" (1.778 m)   Wt 94.3 kg (208 lb)   SpO2 97%   BMI 29.84 kg/m²     Current Shift:  No intake/output data recorded. Last three shifts:  01/09 1901 - 01/11 0700  In: 2525.8 [I.V.:2525.8]  Out: -     Physical Exam:  General: WD, WN. Alert, cooperative, no acute distress    HEENT: NC, Atraumatic. PERRLA, anicteric sclerae. Lungs: CTA Bilaterally. No Wheezing/Rhonchi/Rales. Heart:  Regular  rhythm,  No murmur, No Rubs, No Gallops  Abdomen: Soft, Non distended, Non tender. +Bowel sounds,   Extremities: No c/c edema noted in ankle with erythema   Also mild swelling of knees likely gout flare   Psych:   Not anxious or agitated. Neurologic:  No acute neurological deficit except bilateral LE weakness ,sensation same             Labs: Results:       Chemistry Recent Labs     01/10/21  0237 01/09/21  0205   * 125*    138   K 4.2 4.2    105   CO2 23 22   BUN 30* 35*   CREA 1.32* 1.28   CA 8.6 8.3*   AGAP 9 11   BUCR 23* 27*      CBC w/Diff Recent Labs     01/10/21  0237 01/09/21  0205   WBC 12.5 12.2   RBC 3.92* 3.68*   HGB 11.4* 10.9*   HCT 35.7* 33.5*    218   GRANS 80* 80*   LYMPH 9* 10*   EOS 1 0      Cardiac Enzymes Recent Labs     01/11/21  0135 01/10/21  0237   * 1,682*      Coagulation No results for input(s): PTP, INR, APTT, INREXT, INREXT in the last 72 hours.     Lipid Panel No results found for: CHOL, CHOLPOCT, CHOLX, CHLST, CHOLV, 288935, HDL, HDLP, LDL, LDLC, DLDLP, 765248, VLDLC, VLDL, TGLX, TRIGL, TRIGP, TGLPOCT, CHHD, CHHDX   BNP No results for input(s): BNPP in the last 72 hours. Liver Enzymes No results for input(s): TP, ALB, TBIL, AP in the last 72 hours. No lab exists for component: SGOT, GPT, DBIL   Thyroid Studies No results found for: T4, T3U, TSH, TSHEXT, TSHEXT     Procedures/imaging: see electronic medical records for all procedures/Xrays and details which were not copied into this note but were reviewed prior to creation of Plan    Xr Myelo Lumbar    Result Date: 1/7/2021  EXAM: LUMBAR SPINE MYELOGRAM AND POSTMYELOGRAM LUMBAR SPINE CT INDICATION: Bilateral lower extremity weakness, severe low back pain, prior lumbar fusion, possible cauda equina syndrome, cardiac pacing device unable to undergo MRI COMPARISON: Noncontrast CT lumbar spine earlier the same day TECHNIQUE: Written informed consent was obtained from the patient after thorough explanation of the procedure, risks, and benefits. The patient was placed prone upon the fluoroscopy table. The patient's back was prepped and draped in the normal sterile fashion. 1% lidocaine buffered with sodium bicarbonate was used for local anesthetic. Next using fluoroscopic guidance, a 22 gauge three and half-inch spinal needle was advanced into the thecal sac at the L3 level through laminectomy defect. Immediate CSF return was seen. Then approximately 15 cc of Isovue 200-M contrast was slowly instilled into the thecal sac under intermittent fluoroscopic observation. The stylet was replaced. The needle was removed. Hemostasis was achieved. There were no immediate complications. Multiple routine digital fluoroscopic images were then obtained. Patient was unable to tolerate upright imaging due to leg weakness. The patient was sent to the CT suite for a postmyelogram lumbar spine CT. Multiple axial CT images of the lumbar spine were performed. Sagittal and coronal reconstructions were also performed.   One or more dose reduction techniques were used on this CT: automated exposure control, adjustment of the mAs and/or kVp according to patient's size, and iterative reconstruction techniques. The specific techniques utilized on this CT exam have been documented in the patient's electronic medical record. Digital Imaging and Communications in Medicine (DICOM) format image data are available to nonaffiliated external healthcare facilities or entities on a secure, media free, reciprocally searchable basis with patient authorization for at least a 12-month period after this study. Fluoroscopy radiation dose (reference air kerma), in mGy: 56.50 GUIDANCE: Fluoroscopic guidance was used to position (and confirm the position of) the spinal needle. Image(s) saved in PACS: Fluoroscopy ____________________ FINDINGS: LUMBAR MYELOGRAM The patient has previously undergone spinal fusion of the L2-S1 levels with bipedicular screws and fusion rods. Accompanying laminectomy changes are present at L2-L5 levels. Additional interbody graft fusion has been performed of each of the disc levels. The L5-S1 interbody graft is from an anterior approach with interlocking screws. Slight L1-L2 retrolisthesis is present. Of note, upright imaging not able to be obtained. Patient had limited mobility on the table limiting overall myelographic evaluation. No high-grade central stenosis is seen. No hardware failure is seen. Lucency is associated with the right L2 pedicle screw exuberant paraspinal calcification/osteophyte is present at the L1-L2 junctional level. Please refer to the post-myelogram CT findings below for further details. POSTMYELOGRAM LUMBAR SPINE CT Post surgical changes from L2-S1 spinal fusion are again with bipedicular screws and interlocking fusion rods. Additional interbody graft fusion is present at each of the 4 disc levels with anterior approach for the L5-S1 interbody graft. Accompanying laminectomy changes are seen.  Slight L1-L2 retrolisthesis is present. There is abnormal hypodensity associated with the superior lateral aspect of the L2 vertebral body with a somewhat oblique configuration. Slight depression of the superior endplate is present. This hypodensity represents a fracture line and extends to the superior margin of the pedicle screw and further posteriorly through the right pedicle through the osseous graft fusion material. Adjacent hypodensity is present partially surrounding this right L2 pedicle screw. There may be an additional incomplete fracture line through the inferior aspect of the L1 spinous process. There is prominent curvilinear calcification representing callus formation and/or developing osteophyte along the lateral margins of this L1-2 disc space. No solid bridging bone is present. Other vertebral body heights are preserved. The conus medullaris is located at the L1-L2 level and has a normal appearance. No distal cord or conus impingement is seen. T12-L1 level: There is no central or foraminal stenosis. L1/2 level: Disc bulge is present slightly uncovered by the retrolisthesis. No central stenosis is present. Mild foraminal stenosis is present. L2/3 level: This level is a prior surgical fusion level. Thickened calcification is present along the posterior margin of the disc, perhaps representing ossification of posterior longitudinal ligament or osseous graft fusion material, unchanged. Minimal foraminal narrowing is present. No central stenosis is present. L3/4 level: This level is a prior surgical fusion level. There is no central or foraminal stenosis. L4/5 level: This level is a prior surgical fusion level. There is no central or foraminal stenosis. L5/S1 level: This level is a prior surgical fusion level. Hypertrophic changes produce moderate bilateral foraminal stenosis. No central stenosis is present. Partially visualized prostate is enlarged and impresses upon the base of the urinary bladder.  There is abnormal bladder wall thickening present. Small hypodense outpouchings are seen laterally suggesting bladder diverticula. Atherosclerotic calcifications are present. ____________________     IMPRESSION: 1. Postsurgical changes from prior L2-S1 instrument fusion with bipedicular screws and interlocking fusion rods as well as interbody graft fusion placement at each of the fusion levels including anterior approach L5-S1 grafts with interlocking screws. Accompanying laminectomies are seen. -No central stenosis at the postsurgical levels. No impingement upon the distal cord/conus. 2. Fracture involving the superior lateral aspect of the L2 vertebral body extending along the right L2 screw through the right pedicle. Fracture age is strictly indeterminate but suspect subacute. Prominent paraspinal callus formation and developing osteophyte. 3. Slight L1-L2 retrolisthesis without short-term interval change. Patient unable to tolerate upright or flexion/extension positioning for further evaluation. 4. No additional fracture is seen. 5. No high-grade central or foraminal stenosis. Additional mild multilevel degenerative changes throughout the lumbar spine , as described in detail in Findings section. 6. Abnormal bladder wall thickening which may reflect bladder outlet obstruction and/or cystitis. Associated small bilateral bladder diverticula.    Ct Spine Lumb Wo Cont    Result Date: 1/7/2021  Examination: CT lumbar spine without contrast. HISTORY: Patient with increased weakness in bilateral lower extremities, progressive over several days. TECHNIQUE: CT imaging of the lumbar spine was performed in the axial plane utilizing both bone and soft tissue reconstruction algorithms. Standard coronal and sagittal reformatted images were performed by the technologist and are included in interpretation. One or more dose reduction techniques were used on this CT: automated exposure control, adjustment of the mAs and/or kVp according to patient size, and  iterative reconstruction techniques. The specific techniques used on this CT exam have been documented in the patient's electronic medical record. Digital Imaging and Communications in Medicine (DICOM) format image data are available to nonaffiliated external healthcare facilities or entities on a secure, media free, reciprocally searchable basis with patient authorization for at least a 12-month period after this study. COMPARISON: No relevant prior imaging is available for review or comparison. FINDINGS: There is mild dextrorotatory curvature of the thoracolumbar junction demonstrated. Sagittal reformatted imaging demonstrates mild anterolisthesis of L5 on S1. There are postoperative changes from posterior instrumented spinal fusion extending from L2-S1. Interlaminar fixation cages are noted at L2-L3, L3-L4, L4-L5, and L5-S1. Anterior plate and screw construct across the L5-S1 level is present. There are paired pedicular screws present at the posteriorly decompressed/fused levels. Slight medial position of the right L2 pedicle screw noted. L3-L5 pedicle screws appear centered within the respective pedicles. Minor anterior cortical penetration of the right S1 body screw noted. Vertebral body heights appear preserved. No compression fracture demonstrated. Sacrum is intact. Mild bilateral SI joint osteoarthritis. No suspicious appearing lytic or blastic osseous lesion. Secondary to beam hardening artifact incurred from the indwelling spinal instrumentation, contents of the spinal canal are not well assessed by this examination. Prominent disc osteophyte at the L2-L3 level noted with likely ventral impression of thecal sac; however detail is limited. No high-grade neuroforaminal stenosis demonstrated. Review retroperitoneal soft tissue structures demonstrates relatively diffuse aortobiiliac atherosclerotic vascular calcification without evidence of aneurysmal dilatation. No adenopathy or free pelvic fluid. IMPRESSION: 1. Postoperative changes from posterior instrumented spinal fusion, decompression, and interlaminar fixation L2-S1 without acute osseous abnormality demonstrated. 2. Limited assessment of the contents of the spinal canal secondary to extensive artifact from indwelling spinal instrumentation. No definite findings on this examination to suggest a high-grade osseous canal stenosis with the majority of the lumbar spine posteriorly decompressed. 3. No high-grade neural foraminal stenosis. Ct Spine Lumb W Cont    Result Date: 1/7/2021  EXAM: LUMBAR SPINE MYELOGRAM AND POSTMYELOGRAM LUMBAR SPINE CT INDICATION: Bilateral lower extremity weakness, severe low back pain, prior lumbar fusion, possible cauda equina syndrome, cardiac pacing device unable to undergo MRI COMPARISON: Noncontrast CT lumbar spine earlier the same day TECHNIQUE: Written informed consent was obtained from the patient after thorough explanation of the procedure, risks, and benefits. The patient was placed prone upon the fluoroscopy table. The patient's back was prepped and draped in the normal sterile fashion. 1% lidocaine buffered with sodium bicarbonate was used for local anesthetic. Next using fluoroscopic guidance, a 22 gauge three and half-inch spinal needle was advanced into the thecal sac at the L3 level through laminectomy defect. Immediate CSF return was seen. Then approximately 15 cc of Isovue 200-M contrast was slowly instilled into the thecal sac under intermittent fluoroscopic observation. The stylet was replaced. The needle was removed. Hemostasis was achieved. There were no immediate complications. Multiple routine digital fluoroscopic images were then obtained. Patient was unable to tolerate upright imaging due to leg weakness. The patient was sent to the CT suite for a postmyelogram lumbar spine CT. Multiple axial CT images of the lumbar spine were performed.   Sagittal and coronal reconstructions were also performed. One or more dose reduction techniques were used on this CT: automated exposure control, adjustment of the mAs and/or kVp according to patient's size, and iterative reconstruction techniques. The specific techniques utilized on this CT exam have been documented in the patient's electronic medical record. Digital Imaging and Communications in Medicine (DICOM) format image data are available to nonaffiliated external healthcare facilities or entities on a secure, media free, reciprocally searchable basis with patient authorization for at least a 12-month period after this study. Fluoroscopy radiation dose (reference air kerma), in mGy: 56.50 GUIDANCE: Fluoroscopic guidance was used to position (and confirm the position of) the spinal needle. Image(s) saved in PACS: Fluoroscopy ____________________ FINDINGS: LUMBAR MYELOGRAM The patient has previously undergone spinal fusion of the L2-S1 levels with bipedicular screws and fusion rods. Accompanying laminectomy changes are present at L2-L5 levels. Additional interbody graft fusion has been performed of each of the disc levels. The L5-S1 interbody graft is from an anterior approach with interlocking screws. Slight L1-L2 retrolisthesis is present. Of note, upright imaging not able to be obtained. Patient had limited mobility on the table limiting overall myelographic evaluation. No high-grade central stenosis is seen. No hardware failure is seen. Lucency is associated with the right L2 pedicle screw exuberant paraspinal calcification/osteophyte is present at the L1-L2 junctional level. Please refer to the post-myelogram CT findings below for further details. POSTMYELOGRAM LUMBAR SPINE CT Post surgical changes from L2-S1 spinal fusion are again with bipedicular screws and interlocking fusion rods. Additional interbody graft fusion is present at each of the 4 disc levels with anterior approach for the L5-S1 interbody graft.  Accompanying laminectomy changes are seen. Slight L1-L2 retrolisthesis is present. There is abnormal hypodensity associated with the superior lateral aspect of the L2 vertebral body with a somewhat oblique configuration. Slight depression of the superior endplate is present. This hypodensity represents a fracture line and extends to the superior margin of the pedicle screw and further posteriorly through the right pedicle through the osseous graft fusion material. Adjacent hypodensity is present partially surrounding this right L2 pedicle screw. There may be an additional incomplete fracture line through the inferior aspect of the L1 spinous process. There is prominent curvilinear calcification representing callus formation and/or developing osteophyte along the lateral margins of this L1-2 disc space. No solid bridging bone is present. Other vertebral body heights are preserved. The conus medullaris is located at the L1-L2 level and has a normal appearance. No distal cord or conus impingement is seen. T12-L1 level: There is no central or foraminal stenosis. L1/2 level: Disc bulge is present slightly uncovered by the retrolisthesis. No central stenosis is present. Mild foraminal stenosis is present. L2/3 level: This level is a prior surgical fusion level. Thickened calcification is present along the posterior margin of the disc, perhaps representing ossification of posterior longitudinal ligament or osseous graft fusion material, unchanged. Minimal foraminal narrowing is present. No central stenosis is present. L3/4 level: This level is a prior surgical fusion level. There is no central or foraminal stenosis. L4/5 level: This level is a prior surgical fusion level. There is no central or foraminal stenosis. L5/S1 level: This level is a prior surgical fusion level. Hypertrophic changes produce moderate bilateral foraminal stenosis. No central stenosis is present.  Partially visualized prostate is enlarged and impresses upon the base of the urinary bladder. There is abnormal bladder wall thickening present. Small hypodense outpouchings are seen laterally suggesting bladder diverticula. Atherosclerotic calcifications are present. ____________________     IMPRESSION: 1. Postsurgical changes from prior L2-S1 instrument fusion with bipedicular screws and interlocking fusion rods as well as interbody graft fusion placement at each of the fusion levels including anterior approach L5-S1 grafts with interlocking screws. Accompanying laminectomies are seen. -No central stenosis at the postsurgical levels. No impingement upon the distal cord/conus. 2. Fracture involving the superior lateral aspect of the L2 vertebral body extending along the right L2 screw through the right pedicle. Fracture age is strictly indeterminate but suspect subacute. Prominent paraspinal callus formation and developing osteophyte. 3. Slight L1-L2 retrolisthesis without short-term interval change. Patient unable to tolerate upright or flexion/extension positioning for further evaluation. 4. No additional fracture is seen. 5. No high-grade central or foraminal stenosis. Additional mild multilevel degenerative changes throughout the lumbar spine , as described in detail in Findings section. 6. Abnormal bladder wall thickening which may reflect bladder outlet obstruction and/or cystitis. Associated small bilateral bladder diverticula. Xr Chest Port    Result Date: 1/7/2021  EXAM: XR CHEST PORT CLINICAL INDICATION/HISTORY: sepsis   > Additional: None. COMPARISON: June 17, 2014 TECHNIQUE: Portable chest _______________ FINDINGS: SUPPORT DEVICES: Left subclavian approach cardiac pacer device is now seen with leads projecting over the right atrium, right ventricle, and coronary sinus. HEART AND MEDIASTINUM: Normal size and contour. Normal pulmonary vasculature. LUNGS AND PLEURAL SPACES: The lungs are well expanded and clear. No focal consolidation, effusion, or pneumothorax.  BONY THORAX AND SOFT TISSUES: Left shoulder arthroplasty hardware is noted with degenerative changes present in both acromioclavicular joints. _______________     IMPRESSION: No active cardiopulmonary disease.

## 2021-01-11 NOTE — PROGRESS NOTES
Discharge Planning:  SNF vs Rehab    CM spoke with the patient and his wife. Patient aware that therapy is recommending rehab upon discharge and he is in agreement with this plan. CMS referral placed to reach out to area SNFs and rehabs. Care Management Interventions  PCP Verified by CM:  Yes  Transition of Care Consult (CM Consult): SNF, Discharge Planning  Physical Therapy Consult: Yes  Occupational Therapy Consult: Yes  Current Support Network: Lives with Spouse  Confirm Follow Up Transport: Family  The Plan for Transition of Care is Related to the Following Treatment Goals : Weakness of both legs  The Patient and/or Patient Representative was Provided with a Choice of Provider and Agrees with the Discharge Plan?: Yes  Name of the Patient Representative Who was Provided with a Choice of Provider and Agrees with the Discharge Plan: Enmanuel Moore  Freedom of Choice List was Provided with Basic Dialogue that Supports the Patient's Individualized Plan of Care/Goals, Treatment Preferences and Shares the Quality Data Associated with the Providers?: Yes

## 2021-01-11 NOTE — PROGRESS NOTES
Problem: Self Care Deficits Care Plan (Adult)  Goal: *Acute Goals and Plan of Care (Insert Text)  Description: Initial Occupational Therapy Goals (1/11/2021) Within 7 day(s):    1. Patient will perform grooming seated EOB with supervision x 7 minutes for increased independence with ADLs.  2. Patient will perform UB dressing with min A for increased independence with ADLs.  3. Patient will perform bed <> BSC transfer with mod A for increased independence with ADLs.  4. Patient will independently apply energy conservation techniques with 1 verbal cue(s) for increased independence with ADLs.  5. Patient will utilize good body mechanics during ADLs with 1 verbal cue(s).  6. Patient will perform supine>sit transfer with mod A in preparation for seated ADLs.    Outcome: Progressing Towards Goal   OCCUPATIONAL THERAPY EVALUATION    Patient: Clifton Rodriguez (80 y.o. male)  Date: 1/11/2021  Primary Diagnosis: UTI (urinary tract infection) [N39.0]        Precautions: Fall  PLOF: pt was ambulating short distances with rollator PTA, spouse would assist with ADLs PRN    ASSESSMENT :  Based on the objective data described below, the patient presents with decreased activity tolerance, coordination, strength, ROM limiting independence with ADLs. Pt found supine in bed, spouse at bedside, pt talkative throughout session. Pt demonstrating BLE spasms/tremors and pt reporting 10/10 pain. Pt reports having an injured R shoulder due to a muscle sprain, AROM of shoulder limited to ~45* due to pain. Pt required min A to complete hand and face washing long supported sitting on bed. Pt's functional mobility greatly limited by BLE pain and abnormal tone. Discussed recommendation of rehab placement upon hospital d/c with pt and spouse in agreement. Pt left supine in bed, call bell/phone within reach.     Education: role of OT in acute care, independent pressure relief    Patient will benefit from skilled intervention to address the above  impairments. Patient's rehabilitation potential is considered to be Good  Factors which may influence rehabilitation potential include:   []             None noted  []             Mental ability/status  [x]             Medical condition  []             Home/family situation and support systems  []             Safety awareness  [x]             Pain tolerance/management  []             Other:      PLAN :  Recommendations and Planned Interventions:   [x]               Self Care Training                  [x]      Therapeutic Activities  [x]               Functional Mobility Training   []      Cognitive Retraining  [x]               Therapeutic Exercises           [x]      Endurance Activities  [x]               Balance Training                    [x]      Neuromuscular Re-Education  []               Visual/Perceptual Training     [x]      Home Safety Training  [x]               Patient Education                   [x]      Family Training/Education  []               Other (comment):    Frequency/Duration: Patient will be followed by occupational therapy 1-2 times per day/4-7 days per week to address goals. Discharge Recommendations: Rehab  Further Equipment Recommendations for Discharge: To Be Determined (TBD) at next level of care     SUBJECTIVE:   Patient stated I am always hurting, both of my legs hurt bad. 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 [employer]    HX SHOULDER REPLACEMENT  2009    HX TONSILLECTOMY      ME CARDIAC SURG PROCEDURE UNLIST  2009    stent placement     Barriers to Learning/Limitations: yes;  physical  Compensate with: visual, verbal, tactile, kinesthetic cues/model    Home Situation: pt mod I for ADLs/functional mobility  Home Situation  Home Environment: Private residence  Wheelchair Ramp: Yes  One/Two Story Residence: Two story  # of Interior Steps: 14  Lift Chair Available: No  Living Alone: No  Support Systems: Spouse/Significant Other/Partner  Patient Expects to be Discharged to[de-identified] Private residence  Current DME Used/Available at Home: Tashi Nicole, [place], Tashi Nicole, [address] [facility] bed, Wheelchair  []  Right hand dominant   []  Left hand dominant    Cognitive/Behavioral Status:  Neurologic State: Alert  Orientation Level: Oriented to person;Oriented to place;Oriented to situation;Disoriented to time  Cognition: Follows commands  Safety/Judgement: Awareness of environment    Coordination: BUE  Coordination: Generally decreased, functional  Fine Motor Skills-Upper: Left Intact; Right Intact    Gross Motor Skills-Upper: Right Impaired;Left Intact    Strength: BUE  Strength: Generally decreased, functional    Tone & Sensation: BUE  Tone: Abnormal  Sensation: Impaired    Range of Motion: BUE  AROM: Generally decreased, functional  PROM: Generally decreased, functional    ADL Assessment:   Feeding: Setup;Minimum assistance    Oral Facial Hygiene/Grooming: Minimum assistance    Bathing: Total assistance    Upper Body Dressing: Maximum assistance    Lower Body Dressing: Total assistance    Toileting:  Total assistance    ADL Intervention:  Grooming  Grooming Assistance: Minimum assistance  Position Performed: (long supported sitting in bed)  Washing Face: Minimum assistance  Washing Hands: Minimum assistance    Cognitive Retraining  Safety/Judgement: Awareness of environment    Pain:  Pain level pre-treatment: 10/10   Pain level post-treatment: 10/10   Pain Intervention(s): Medication provided by Nursing (see MAR); Rest, Ice, Repositioning   Response to intervention: Nurse notified, See doc flow sheet    Activity Tolerance:   Poor+. Patient limited by pain, coordination, strength. Patient unsteady. Please refer to the flowsheet for vital signs taken during this treatment. After treatment:   [] Patient left in no apparent distress sitting up in chair  [x] Patient left in no apparent distress in bed  [x] Call bell left within reach  [x] Nursing notified  [x] Caregiver present  [] Bed alarm activated    COMMUNICATION/EDUCATION:   [x] Role of Occupational Therapy in the acute care setting  [x] Home safety education was provided and the patient/caregiver indicated understanding. [x] Patient/family have participated as able in goal setting and plan of care. [x] Patient/family agree to work toward stated goals and plan of care. [] Patient understands intent and goals of therapy, but is neutral about his/her participation. [] Patient is unable to participate in goal setting and plan of care. Thank you for this referral.  Alayna Lawrence, OTR/L  Time Calculation: 24 mins    Eval Complexity: History: HIGH Complexity : Extensive review of history including physical, cognitive and psychosocial history ; Examination: HIGH Complexity : 5 or more performance deficits relating to physical, cognitive , or psychosocial skils that result in activity limitations and / or participation restrictions; Decision Making:HIGH Complexity : Patient presents with comorbidities that affect occupational performance.  Signifigant modification of tasks or assistance (eg, physical or verbal) with assessment (s) is necessary to enable patient to complete evaluation

## 2021-01-12 LAB
BASOPHILS # BLD: 0 K/UL (ref 0–0.1)
BASOPHILS NFR BLD: 0 % (ref 0–2)
CK SERPL-CCNC: 321 U/L (ref 39–308)
DIFFERENTIAL METHOD BLD: ABNORMAL
EOSINOPHIL # BLD: 0 K/UL (ref 0–0.4)
EOSINOPHIL NFR BLD: 0 % (ref 0–5)
ERYTHROCYTE [DISTWIDTH] IN BLOOD BY AUTOMATED COUNT: 16.7 % (ref 11.6–14.5)
HCT VFR BLD AUTO: 38 % (ref 36–48)
HGB BLD-MCNC: 12 G/DL (ref 13–16)
LYMPHOCYTES # BLD: 0.7 K/UL (ref 0.9–3.6)
LYMPHOCYTES NFR BLD: 5 % (ref 21–52)
MAGNESIUM SERPL-MCNC: 2.8 MG/DL (ref 1.6–2.6)
MCH RBC QN AUTO: 29.6 PG (ref 24–34)
MCHC RBC AUTO-ENTMCNC: 31.6 G/DL (ref 31–37)
MCV RBC AUTO: 93.8 FL (ref 74–97)
MONOCYTES # BLD: 0.4 K/UL (ref 0.05–1.2)
MONOCYTES NFR BLD: 3 % (ref 3–10)
NEUTS SEG # BLD: 14.2 K/UL (ref 1.8–8)
NEUTS SEG NFR BLD: 92 % (ref 40–73)
PLATELET # BLD AUTO: 239 K/UL (ref 135–420)
PMV BLD AUTO: 9.7 FL (ref 9.2–11.8)
RBC # BLD AUTO: 4.05 M/UL (ref 4.7–5.5)
WBC # BLD AUTO: 15.4 K/UL (ref 4.6–13.2)

## 2021-01-12 PROCEDURE — 97530 THERAPEUTIC ACTIVITIES: CPT

## 2021-01-12 PROCEDURE — 74011250637 HC RX REV CODE- 250/637: Performed by: HOSPITALIST

## 2021-01-12 PROCEDURE — 65270000029 HC RM PRIVATE

## 2021-01-12 PROCEDURE — 85025 COMPLETE CBC W/AUTO DIFF WBC: CPT

## 2021-01-12 PROCEDURE — 74011250636 HC RX REV CODE- 250/636: Performed by: INTERNAL MEDICINE

## 2021-01-12 PROCEDURE — 36415 COLL VENOUS BLD VENIPUNCTURE: CPT

## 2021-01-12 PROCEDURE — 77030040361 HC SLV COMPR DVT MDII -B

## 2021-01-12 PROCEDURE — 82550 ASSAY OF CK (CPK): CPT

## 2021-01-12 PROCEDURE — 83735 ASSAY OF MAGNESIUM: CPT

## 2021-01-12 PROCEDURE — 74011250637 HC RX REV CODE- 250/637: Performed by: PSYCHIATRY & NEUROLOGY

## 2021-01-12 PROCEDURE — 74011000258 HC RX REV CODE- 258: Performed by: INTERNAL MEDICINE

## 2021-01-12 PROCEDURE — 74011250636 HC RX REV CODE- 250/636: Performed by: HOSPITALIST

## 2021-01-12 PROCEDURE — 74011250637 HC RX REV CODE- 250/637: Performed by: ORTHOPAEDIC SURGERY

## 2021-01-12 RX ORDER — OXYCODONE AND ACETAMINOPHEN 5; 325 MG/1; MG/1
1-2 TABLET ORAL
Status: DISCONTINUED | OUTPATIENT
Start: 2021-01-12 | End: 2021-01-14 | Stop reason: HOSPADM

## 2021-01-12 RX ORDER — PANTOPRAZOLE SODIUM 40 MG/1
40 TABLET, DELAYED RELEASE ORAL
Status: DISCONTINUED | OUTPATIENT
Start: 2021-01-13 | End: 2021-01-14 | Stop reason: HOSPADM

## 2021-01-12 RX ORDER — PREDNISONE 20 MG/1
40 TABLET ORAL
Status: DISCONTINUED | OUTPATIENT
Start: 2021-01-13 | End: 2021-01-14 | Stop reason: HOSPADM

## 2021-01-12 RX ORDER — MORPHINE SULFATE 2 MG/ML
1 INJECTION, SOLUTION INTRAMUSCULAR; INTRAVENOUS
Status: DISCONTINUED | OUTPATIENT
Start: 2021-01-12 | End: 2021-01-13

## 2021-01-12 RX ADMIN — HEPARIN SODIUM 5000 UNITS: 5000 INJECTION INTRAVENOUS; SUBCUTANEOUS at 18:11

## 2021-01-12 RX ADMIN — BACLOFEN 5 MG: 10 TABLET ORAL at 23:58

## 2021-01-12 RX ADMIN — SODIUM CHLORIDE 25 ML/HR: 900 INJECTION, SOLUTION INTRAVENOUS at 23:58

## 2021-01-12 RX ADMIN — DIAZEPAM 2.5 MG: 5 TABLET ORAL at 04:54

## 2021-01-12 RX ADMIN — BACLOFEN 5 MG: 10 TABLET ORAL at 10:37

## 2021-01-12 RX ADMIN — Medication 10 ML: at 16:43

## 2021-01-12 RX ADMIN — Medication 10 ML: at 00:59

## 2021-01-12 RX ADMIN — PIPERACILLIN AND TAZOBACTAM 3.38 G: 3; .375 INJECTION, POWDER, LYOPHILIZED, FOR SOLUTION INTRAVENOUS at 23:58

## 2021-01-12 RX ADMIN — PIPERACILLIN AND TAZOBACTAM 3.38 G: 3; .375 INJECTION, POWDER, LYOPHILIZED, FOR SOLUTION INTRAVENOUS at 12:49

## 2021-01-12 RX ADMIN — OXYCODONE AND ACETAMINOPHEN 1 TABLET: 5; 325 TABLET ORAL at 23:58

## 2021-01-12 RX ADMIN — METHYLPREDNISOLONE SODIUM SUCCINATE 40 MG: 40 INJECTION, POWDER, FOR SOLUTION INTRAMUSCULAR; INTRAVENOUS at 00:59

## 2021-01-12 RX ADMIN — MORPHINE SULFATE 2 MG: 2 INJECTION, SOLUTION INTRAMUSCULAR; INTRAVENOUS at 07:30

## 2021-01-12 RX ADMIN — BACLOFEN 5 MG: 10 TABLET ORAL at 18:11

## 2021-01-12 RX ADMIN — Medication 10 ML: at 23:58

## 2021-01-12 RX ADMIN — MORPHINE SULFATE 1 MG: 2 INJECTION, SOLUTION INTRAMUSCULAR; INTRAVENOUS at 12:49

## 2021-01-12 RX ADMIN — DOCUSATE SODIUM 100 MG: 100 CAPSULE, LIQUID FILLED ORAL at 10:37

## 2021-01-12 RX ADMIN — METHYLPREDNISOLONE SODIUM SUCCINATE 40 MG: 40 INJECTION, POWDER, FOR SOLUTION INTRAMUSCULAR; INTRAVENOUS at 07:26

## 2021-01-12 RX ADMIN — PIPERACILLIN AND TAZOBACTAM 3.38 G: 3; .375 INJECTION, POWDER, LYOPHILIZED, FOR SOLUTION INTRAVENOUS at 18:10

## 2021-01-12 RX ADMIN — Medication 10 ML: at 07:26

## 2021-01-12 RX ADMIN — SODIUM CHLORIDE 75 ML/HR: 900 INJECTION, SOLUTION INTRAVENOUS at 04:54

## 2021-01-12 RX ADMIN — BACLOFEN 5 MG: 10 TABLET ORAL at 00:58

## 2021-01-12 RX ADMIN — METHYLPREDNISOLONE SODIUM SUCCINATE 40 MG: 40 INJECTION, POWDER, FOR SOLUTION INTRAMUSCULAR; INTRAVENOUS at 12:49

## 2021-01-12 RX ADMIN — PIPERACILLIN AND TAZOBACTAM 3.38 G: 3; .375 INJECTION, POWDER, LYOPHILIZED, FOR SOLUTION INTRAVENOUS at 07:26

## 2021-01-12 RX ADMIN — PIPERACILLIN AND TAZOBACTAM 3.38 G: 3; .375 INJECTION, POWDER, LYOPHILIZED, FOR SOLUTION INTRAVENOUS at 00:59

## 2021-01-12 NOTE — PROGRESS NOTES
Problem: Mobility Impaired (Adult and Pediatric)  Goal: *Acute Goals and Plan of Care (Insert Text)  Description: Physical Therapy Goals  Initiated 1/9/2021 and to be accomplished within 3-7 day(s)  1. Patient will move from supine to sit and sit to supine  and roll side to side in bed with minimal assistance/contact guard assist.    2.  Patient will transfer from bed to chair and chair to bed with moderate assistance  using the least restrictive device. 3.  Patient will perform sit to stand with maximal assistance. Will add amb goals when appropriate     Outcome: Progressing Towards Goal  PHYSICAL THERAPY TREATMENT    Patient: Ky Lau (56 y.o. male)  Date: 1/12/2021  Diagnosis: UTI (urinary tract infection) [N39.0] Weakness of both legs  Precautions: Fall   Chart, physical therapy assessment, plan of care and goals were reviewed. ASSESSMENT:  Pt found supine in bed and willing to participate with PT session. Spouse present in room. Pt noted to bed soiled during rolling activity. CNA Sneha in to perform hygiene care with PT assist. Pt rolling L/R more easily today mod assist and better able to utilize bedrails for assist (LUE>RUE). After short rest, pt able to transition supine to sit EOB with mod/max assist with vc for log rolling and HOB elevated to facilitate transition forom side lying to sit. Poor static sitting balance initially with pt requiring mod assist to prevent posterior LOB. Improved with time and once able to establish UE support at bed rail and L UE and bedrail RUE. Tolerated sitting EOB maintaining midline ~3-4 min thereafter. Pt returned to supine with mod/max A/vc, and shifted up in bed with total A x2. Pt left in NAD with all needs in reach and spouse returned to room (left during hygiene care at start of session). Discussed need for rehab and pt and spouse agree to same. Will continue per POC.   Progression toward goals:  []      Improving appropriately and progressing toward goals  [x]      Improving slowly and progressing toward goals  []      Not making progress toward goals and plan of care will be adjusted     PLAN:  Patient continues to benefit from skilled intervention to address the above impairments. Continue treatment per established plan of care. Discharge Recommendations:  Rehab  Further Equipment Recommendations for Discharge:  to be determined at next level of care     SUBJECTIVE:   Patient stated I'll do anything you ask if I can.     OBJECTIVE DATA SUMMARY:   Critical Behavior:  Neurologic State: Alert, Appropriate for age  Orientation Level: Oriented to person, Oriented to place, Oriented to situation  Cognition: Follows commands  Safety/Judgement: Awareness of environment  Functional Mobility Training:  Bed Mobility:  Rolling: Moderate assistance  Supine to Sit: Moderate assistance;Maximum assistance;Bed Modified  Sit to Supine: Moderate assistance;Maximum assistance;Bed Modified  Scooting: Total assistance;Assist x2(to shift to [facility])  Balance:  Sitting: Impaired; With support  Sitting - Static: Poor (constant support)(Fair-)  Sitting - Dynamic: Not tested  Pain:  Pain Scale 1: Numeric (0 - 10)  Pain Intensity 1: 2  Pain Location 1: Leg;Foot; Ankle  Pain Orientation 1: (Bilat LE's and R foot/ankle)  Pain Description 1: Burning  Pain Intervention(s) 1: Medication (see MAR)  Activity Tolerance:   Fair+/good  Please refer to the flowsheet for vital signs taken during this treatment.   After treatment:   [] Patient left in no apparent distress sitting up in chair  [x] Patient left in no apparent distress in bed  [x] Call bell left within reach  [] Nursing notified  [x] Caregiver present  [] Bed alarm activated      Daniela Garcia PT   Time Calculation: 32 mins

## 2021-01-12 NOTE — ROUTINE PROCESS
1520 received SBAR from [address]. Resuming patient care at this time. Patient alert and oriented x 3. Bilateral feet swollen and tender to touch. Patient unable to provide list of home medications. Patient ate all of dinner. No request for pain medication during rest of day shift. SCD's placed on patient. Patient Vitals for the past 12 hrs: 
 Temp Pulse Resp BP SpO2  
01/12/21 1646 97.4 °F (36.3 °C) 82 18 130/61 98 % 01/12/21 1230 97.3 °F (36.3 °C) 81 18 117/66 96 % 01/12/21 0715 97.4 °F (36.3 °C) 78 17 114/68 97 % Bedside and Verbal shift change report given to Janey Dale RN (oncoming nurse) by Cezar Álvarez RN (offgoing nurse). Report included the following information SBAR, Kardex, Intake/Output, MAR and Recent Results.

## 2021-01-12 NOTE — PROGRESS NOTES
1948 - Bedside and Verbal shift change report given to Vadim Diallo RN  (oncoming nurse) by Maira Agustin RN (offgoing nurse). Report included the following information SBAR, Kardex, Intake/Output, MAR and Recent Results. Shift summary -- PRN medications given x 2 for RLE muscle spasms. Incontinent care provided. Bowel movement x 1, brown soft. 0750 - Bedside and Verbal shift change report given to ERIC Funes RN (oncoming nurse) by Talita Taylor RN (offgoing nurse). Report included the following information SBAR, Kardex, Intake/Output, MAR and Recent Results.

## 2021-01-12 NOTE — PROGRESS NOTES
Hospitalist Progress Note-critical care note     Patient: Sasha Cartagena MRN: [id number]  CSN: [id number]    YOB: 1940  Age: 68 y.o. Sex: male    DOA: 1/7/2021 LOS:  LOS: 5 days            Chief complaint: rhabdo , uti , elevated cr, bilateral weakness     Assessment/Plan         Hospital Problems  Date Reviewed: 7/7/2014          Codes Class Noted POA    Traumatic rhabdomyolysis ([facility] [address] ICD-10-CM: T79. 6XXA  ICD-9-CM: 958.6  1/8/2021 Unknown        UTI (urinary tract infection) ICD-10-CM: N39.0  ICD-9-CM: 599.0  1/7/2021 Unknown        * (Principal) Weakness of both legs ICD-10-CM: R29.898  ICD-9-CM: 729.89  1/7/2021 Unknown        Status cardiac pacemaker ICD-10-CM: Z95.0  ICD-9-CM: V45.01  1/7/2021 Unknown        Elevated serum creatinine ICD-10-CM: R79.89  ICD-9-CM: 790.99  1/7/2021 Unknown        Fall at home ICD-10-CM: [address] [place], [place]  ICD-9-CM: E888.9, E849.0  1/7/2021 Unknown        HTN (hypertension) ICD-10-CM: I10  ICD-9-CM: 401.9  4/16/2013 Yes              Weakness    myelo gram per IR done, no high stenosis , L2 fracture -not sure age   No a candidate for surgery   neuro on board-LP done no GBS indicated   Continue PT/OT   Weakness better      Rhabd   Resolved   Due to recent fall   Ck monitoring     Elevated cr -mild improving   Resolved also      Cad so far stable      Pacemaker   Continue remain electrolytes balance         Fall at home   Fall precaution       HTN, accelerated  Hold meds for now due to border line bp     Ankle pain-improving   x-ray no fracture  Continue steroid for gout flares , tophi before     Subjective:weakness better , do not tell my wife about my gout      cm is working for rehab   LP done on 9th, 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 Mild elevated wbc due to steroid.      Discussed with pt about weaning off iv pain meds-he agrees    Disposition :1-2 days   Review of systems:    General: No fevers or chills. Cardiovascular: No chest pain or pressure. No palpitations. Pulmonary: No shortness of breath. Gastrointestinal: No nausea, vomiting. Msk: leg weakness better     Vital signs/Intake and Output:  Visit Vitals  /66 (BP 1 Location: Left arm, BP Patient Position: At rest)   Pulse 81   Temp 97.3 °F (36.3 °C)   Resp 18   Ht 5' 10\" (1.778 m)   Wt 95.4 kg (210 lb 4.8 oz)   SpO2 96%   BMI 30.17 kg/m²     Current Shift:  No intake/output data recorded. Last three shifts:  01/10 1901 - 01/12 0700  In: 3200 [I.V.:3200]  Out: 50 [Urine:50]    Physical Exam:  General: WD, WN. Alert, cooperative, no acute distress    HEENT: NC, Atraumatic. PERRLA, anicteric sclerae. Lungs: CTA Bilaterally. No Wheezing/Rhonchi/Rales. Heart:  Regular  rhythm,  No murmur, No Rubs, No Gallops  Abdomen: Soft, Non distended, Non tender. +Bowel sounds,   Extremities: No c/c, rt ankle mild swelling and tenderness-but no infection indicated,   Psych:   Not anxious or agitated. Neurologic:  No acute neurological deficit except bilateral LE weakness-3/5 now  ,sensation same           Labs: Results:       Chemistry Recent Labs     01/11/21  0135 01/10/21  0237   * 115*    138   K 4.2 4.2    106   CO2 21 23   BUN 26* 30*   CREA 1.24 1.32*   CA 8.3* 8.6   AGAP 12 9   BUCR 21* 23*     --    TP 6.2*  --    ALB 2.1*  --    GLOB 4.1*  --    AGRAT 0.5*  --       CBC w/Diff Recent Labs     01/11/21  0135 01/10/21  0237   WBC 13.8* 12.5   RBC 3.86* 3.92*   HGB 11.6* 11.4*   HCT 35.6* 35.7*    226   GRANS 82* 80*   LYMPH 8* 9*   EOS 2 1      Cardiac Enzymes Recent Labs     01/12/21  0320 01/11/21 0135   * 626*      Coagulation No results for input(s): PTP, INR, APTT, INREXT, INREXT in the last 72 hours.     Lipid Panel No results found for: CHOL, CHOLPOCT, CHOLX, CHLST, CHOLV, 367339, HDL, HDLP, LDL, LDLC, DLDLP, 273549, VLDLC, VLDL, TGLX, TRIGL, TRIGP, TGLPOCT, CHHD, CHHDX   BNP No results for input(s): BNPP in the last 72 hours. Liver Enzymes Recent Labs     01/11/21  0135   TP 6.2*   ALB 2.1*         Thyroid Studies No results found for: T4, T3U, TSH, TSHEXT, TSHEXT     Procedures/imaging: see electronic medical records for all procedures/Xrays and details which were not copied into this note but were reviewed prior to creation of Plan    Xr Myelo Lumbar    Result Date: 1/7/2021  EXAM: LUMBAR SPINE MYELOGRAM AND POSTMYELOGRAM LUMBAR SPINE CT INDICATION: Bilateral lower extremity weakness, severe low back pain, prior lumbar fusion, possible cauda equina syndrome, cardiac pacing device unable to undergo MRI COMPARISON: Noncontrast CT lumbar spine earlier the same day TECHNIQUE: Written informed consent was obtained from the patient after thorough explanation of the procedure, risks, and benefits. The patient was placed prone upon the fluoroscopy table. The patient's back was prepped and draped in the normal sterile fashion. 1% lidocaine buffered with sodium bicarbonate was used for local anesthetic. Next using fluoroscopic guidance, a 22 gauge three and half-inch spinal needle was advanced into the thecal sac at the L3 level through laminectomy defect. Immediate CSF return was seen. Then approximately 15 cc of Isovue 200-M contrast was slowly instilled into the thecal sac under intermittent fluoroscopic observation. The stylet was replaced. The needle was removed. Hemostasis was achieved. There were no immediate complications. Multiple routine digital fluoroscopic images were then obtained. Patient was unable to tolerate upright imaging due to leg weakness. The patient was sent to the CT suite for a postmyelogram lumbar spine CT. Multiple axial CT images of the lumbar spine were performed. Sagittal and coronal reconstructions were also performed.   One or more dose reduction techniques were used on this CT: automated exposure control, adjustment of the mAs and/or kVp according to patient's size, and iterative reconstruction techniques. The specific techniques utilized on this CT exam have been documented in the patient's electronic medical record. Digital Imaging and Communications in Medicine (DICOM) format image data are available to nonaffiliated external healthcare facilities or entities on a secure, media free, reciprocally searchable basis with patient authorization for at least a 12-month period after this study. Fluoroscopy radiation dose (reference air kerma), in mGy: 56.50 GUIDANCE: Fluoroscopic guidance was used to position (and confirm the position of) the spinal needle. Image(s) saved in PACS: Fluoroscopy ____________________ FINDINGS: LUMBAR MYELOGRAM The patient has previously undergone spinal fusion of the L2-S1 levels with bipedicular screws and fusion rods. Accompanying laminectomy changes are present at L2-L5 levels. Additional interbody graft fusion has been performed of each of the disc levels. The L5-S1 interbody graft is from an anterior approach with interlocking screws. Slight L1-L2 retrolisthesis is present. Of note, upright imaging not able to be obtained. Patient had limited mobility on the table limiting overall myelographic evaluation. No high-grade central stenosis is seen. No hardware failure is seen. Lucency is associated with the right L2 pedicle screw exuberant paraspinal calcification/osteophyte is present at the L1-L2 junctional level. Please refer to the post-myelogram CT findings below for further details. POSTMYELOGRAM LUMBAR SPINE CT Post surgical changes from L2-S1 spinal fusion are again with bipedicular screws and interlocking fusion rods. Additional interbody graft fusion is present at each of the 4 disc levels with anterior approach for the L5-S1 interbody graft. Accompanying laminectomy changes are seen. Slight L1-L2 retrolisthesis is present.  There is abnormal hypodensity associated with the superior lateral aspect of the L2 vertebral body with a somewhat oblique configuration. Slight depression of the superior endplate is present. This hypodensity represents a fracture line and extends to the superior margin of the pedicle screw and further posteriorly through the right pedicle through the osseous graft fusion material. Adjacent hypodensity is present partially surrounding this right L2 pedicle screw. There may be an additional incomplete fracture line through the inferior aspect of the L1 spinous process. There is prominent curvilinear calcification representing callus formation and/or developing osteophyte along the lateral margins of this L1-2 disc space. No solid bridging bone is present. Other vertebral body heights are preserved. The conus medullaris is located at the L1-L2 level and has a normal appearance. No distal cord or conus impingement is seen. T12-L1 level: There is no central or foraminal stenosis. L1/2 level: Disc bulge is present slightly uncovered by the retrolisthesis. No central stenosis is present. Mild foraminal stenosis is present. L2/3 level: This level is a prior surgical fusion level. Thickened calcification is present along the posterior margin of the disc, perhaps representing ossification of posterior longitudinal ligament or osseous graft fusion material, unchanged. Minimal foraminal narrowing is present. No central stenosis is present. L3/4 level: This level is a prior surgical fusion level. There is no central or foraminal stenosis. L4/5 level: This level is a prior surgical fusion level. There is no central or foraminal stenosis. L5/S1 level: This level is a prior surgical fusion level. Hypertrophic changes produce moderate bilateral foraminal stenosis. No central stenosis is present. Partially visualized prostate is enlarged and impresses upon the base of the urinary bladder. There is abnormal bladder wall thickening present. Small hypodense outpouchings are seen laterally suggesting bladder diverticula.  Atherosclerotic calcifications are present. ____________________     IMPRESSION: 1. Postsurgical changes from prior L2-S1 instrument fusion with bipedicular screws and interlocking fusion rods as well as interbody graft fusion placement at each of the fusion levels including anterior approach L5-S1 grafts with interlocking screws. Accompanying laminectomies are seen. -No central stenosis at the postsurgical levels. No impingement upon the distal cord/conus. 2. Fracture involving the superior lateral aspect of the L2 vertebral body extending along the right L2 screw through the right pedicle. Fracture age is strictly indeterminate but suspect subacute. Prominent paraspinal callus formation and developing osteophyte. 3. Slight L1-L2 retrolisthesis without short-term interval change. Patient unable to tolerate upright or flexion/extension positioning for further evaluation. 4. No additional fracture is seen. 5. No high-grade central or foraminal stenosis. Additional mild multilevel degenerative changes throughout the lumbar spine , as described in detail in Findings section. 6. Abnormal bladder wall thickening which may reflect bladder outlet obstruction and/or cystitis. Associated small bilateral bladder diverticula. Ct Spine Lumb Wo Cont    Result Date: 1/7/2021  Examination: CT lumbar spine without contrast. HISTORY: Patient with increased weakness in bilateral lower extremities, progressive over several days. TECHNIQUE: CT imaging of the lumbar spine was performed in the axial plane utilizing both bone and soft tissue reconstruction algorithms. Standard coronal and sagittal reformatted images were performed by the technologist and are included in interpretation. One or more dose reduction techniques were used on this CT: automated exposure control, adjustment of the mAs and/or kVp according to patient size, and iterative reconstruction techniques.   The specific techniques used on this CT exam have been documented in the patient's electronic medical record. Digital Imaging and Communications in Medicine (DICOM) format image data are available to nonaffiliated external healthcare facilities or entities on a secure, media free, reciprocally searchable basis with patient authorization for at least a 12-month period after this study. COMPARISON: No relevant prior imaging is available for review or comparison. FINDINGS: There is mild dextrorotatory curvature of the thoracolumbar junction demonstrated. Sagittal reformatted imaging demonstrates mild anterolisthesis of L5 on S1. There are postoperative changes from posterior instrumented spinal fusion extending from L2-S1. Interlaminar fixation cages are noted at L2-L3, L3-L4, L4-L5, and L5-S1. Anterior plate and screw construct across the L5-S1 level is present. There are paired pedicular screws present at the posteriorly decompressed/fused levels. Slight medial position of the right L2 pedicle screw noted. L3-L5 pedicle screws appear centered within the respective pedicles. Minor anterior cortical penetration of the right S1 body screw noted. Vertebral body heights appear preserved. No compression fracture demonstrated. Sacrum is intact. Mild bilateral SI joint osteoarthritis. No suspicious appearing lytic or blastic osseous lesion. Secondary to beam hardening artifact incurred from the indwelling spinal instrumentation, contents of the spinal canal are not well assessed by this examination. Prominent disc osteophyte at the L2-L3 level noted with likely ventral impression of thecal sac; however detail is limited. No high-grade neuroforaminal stenosis demonstrated. Review retroperitoneal soft tissue structures demonstrates relatively diffuse aortobiiliac atherosclerotic vascular calcification without evidence of aneurysmal dilatation. No adenopathy or free pelvic fluid. IMPRESSION: 1.  Postoperative changes from posterior instrumented spinal fusion, decompression, and interlaminar fixation L2-S1 without acute osseous abnormality demonstrated. 2. Limited assessment of the contents of the spinal canal secondary to extensive artifact from indwelling spinal instrumentation. No definite findings on this examination to suggest a high-grade osseous canal stenosis with the majority of the lumbar spine posteriorly decompressed. 3. No high-grade neural foraminal stenosis. Ct Spine Lumb W Cont    Result Date: 1/7/2021  EXAM: LUMBAR SPINE MYELOGRAM AND POSTMYELOGRAM LUMBAR SPINE CT INDICATION: Bilateral lower extremity weakness, severe low back pain, prior lumbar fusion, possible cauda equina syndrome, cardiac pacing device unable to undergo MRI COMPARISON: Noncontrast CT lumbar spine earlier the same day TECHNIQUE: Written informed consent was obtained from the patient after thorough explanation of the procedure, risks, and benefits. The patient was placed prone upon the fluoroscopy table. The patient's back was prepped and draped in the normal sterile fashion. 1% lidocaine buffered with sodium bicarbonate was used for local anesthetic. Next using fluoroscopic guidance, a 22 gauge three and half-inch spinal needle was advanced into the thecal sac at the L3 level through laminectomy defect. Immediate CSF return was seen. Then approximately 15 cc of Isovue 200-M contrast was slowly instilled into the thecal sac under intermittent fluoroscopic observation. The stylet was replaced. The needle was removed. Hemostasis was achieved. There were no immediate complications. Multiple routine digital fluoroscopic images were then obtained. Patient was unable to tolerate upright imaging due to leg weakness. The patient was sent to the CT suite for a postmyelogram lumbar spine CT. Multiple axial CT images of the lumbar spine were performed. Sagittal and coronal reconstructions were also performed.   One or more dose reduction techniques were used on this CT: automated exposure control, adjustment of the mAs and/or kVp according to patient's size, and iterative reconstruction techniques. The specific techniques utilized on this CT exam have been documented in the patient's electronic medical record. Digital Imaging and Communications in Medicine (DICOM) format image data are available to nonaffiliated external healthcare facilities or entities on a secure, media free, reciprocally searchable basis with patient authorization for at least a 12-month period after this study. Fluoroscopy radiation dose (reference air kerma), in mGy: 56.50 GUIDANCE: Fluoroscopic guidance was used to position (and confirm the position of) the spinal needle. Image(s) saved in PACS: Fluoroscopy ____________________ FINDINGS: LUMBAR MYELOGRAM The patient has previously undergone spinal fusion of the L2-S1 levels with bipedicular screws and fusion rods. Accompanying laminectomy changes are present at L2-L5 levels. Additional interbody graft fusion has been performed of each of the disc levels. The L5-S1 interbody graft is from an anterior approach with interlocking screws. Slight L1-L2 retrolisthesis is present. Of note, upright imaging not able to be obtained. Patient had limited mobility on the table limiting overall myelographic evaluation. No high-grade central stenosis is seen. No hardware failure is seen. Lucency is associated with the right L2 pedicle screw exuberant paraspinal calcification/osteophyte is present at the L1-L2 junctional level. Please refer to the post-myelogram CT findings below for further details. POSTMYELOGRAM LUMBAR SPINE CT Post surgical changes from L2-S1 spinal fusion are again with bipedicular screws and interlocking fusion rods. Additional interbody graft fusion is present at each of the 4 disc levels with anterior approach for the L5-S1 interbody graft. Accompanying laminectomy changes are seen. Slight L1-L2 retrolisthesis is present.  There is abnormal hypodensity associated with the superior lateral aspect of the L2 vertebral body with a somewhat oblique configuration. Slight depression of the superior endplate is present. This hypodensity represents a fracture line and extends to the superior margin of the pedicle screw and further posteriorly through the right pedicle through the osseous graft fusion material. Adjacent hypodensity is present partially surrounding this right L2 pedicle screw. There may be an additional incomplete fracture line through the inferior aspect of the L1 spinous process. There is prominent curvilinear calcification representing callus formation and/or developing osteophyte along the lateral margins of this L1-2 disc space. No solid bridging bone is present. Other vertebral body heights are preserved. The conus medullaris is located at the L1-L2 level and has a normal appearance. No distal cord or conus impingement is seen. T12-L1 level: There is no central or foraminal stenosis. L1/2 level: Disc bulge is present slightly uncovered by the retrolisthesis. No central stenosis is present. Mild foraminal stenosis is present. L2/3 level: This level is a prior surgical fusion level. Thickened calcification is present along the posterior margin of the disc, perhaps representing ossification of posterior longitudinal ligament or osseous graft fusion material, unchanged. Minimal foraminal narrowing is present. No central stenosis is present. L3/4 level: This level is a prior surgical fusion level. There is no central or foraminal stenosis. L4/5 level: This level is a prior surgical fusion level. There is no central or foraminal stenosis. L5/S1 level: This level is a prior surgical fusion level. Hypertrophic changes produce moderate bilateral foraminal stenosis. No central stenosis is present. Partially visualized prostate is enlarged and impresses upon the base of the urinary bladder. There is abnormal bladder wall thickening present.  Small hypodense outpouchings are seen laterally suggesting bladder diverticula. Atherosclerotic calcifications are present. ____________________     IMPRESSION: 1. Postsurgical changes from prior L2-S1 instrument fusion with bipedicular screws and interlocking fusion rods as well as interbody graft fusion placement at each of the fusion levels including anterior approach L5-S1 grafts with interlocking screws. Accompanying laminectomies are seen. -No central stenosis at the postsurgical levels. No impingement upon the distal cord/conus. 2. Fracture involving the superior lateral aspect of the L2 vertebral body extending along the right L2 screw through the right pedicle. Fracture age is strictly indeterminate but suspect subacute. Prominent paraspinal callus formation and developing osteophyte. 3. Slight L1-L2 retrolisthesis without short-term interval change. Patient unable to tolerate upright or flexion/extension positioning for further evaluation. 4. No additional fracture is seen. 5. No high-grade central or foraminal stenosis. Additional mild multilevel degenerative changes throughout the lumbar spine , as described in detail in Findings section. 6. Abnormal bladder wall thickening which may reflect bladder outlet obstruction and/or cystitis. Associated small bilateral bladder diverticula. Xr Chest Port    Result Date: 1/7/2021  EXAM: XR CHEST PORT CLINICAL INDICATION/HISTORY: sepsis   > Additional: None. COMPARISON: June 17, 2014 TECHNIQUE: Portable chest _______________ FINDINGS: SUPPORT DEVICES: Left subclavian approach cardiac pacer device is now seen with leads projecting over the right atrium, right ventricle, and coronary sinus. HEART AND MEDIASTINUM: Normal size and contour. Normal pulmonary vasculature. LUNGS AND PLEURAL SPACES: The lungs are well expanded and clear. No focal consolidation, effusion, or pneumothorax. BONY THORAX AND SOFT TISSUES: Left shoulder arthroplasty hardware is noted with degenerative changes present in both acromioclavicular joints. _______________     IMPRESSION: No active cardiopulmonary disease.       Dandy Lerma MD

## 2021-01-12 NOTE — PROGRESS NOTES
Tentative transfer tomorrow as long as patient has refrained from IV pain medicine and IV steroids for 24 hours - no covid test required per the facility. CM spoke with Imani Dietrich RN and informed her of the aforementioned requirements, MD aware as well. Transition of care to Acute Rehab: [facility]    Communication to Patient/Family:  Met with patient and family, and they are agreeable to the transition plan. The Plan for Transition of Care is related to the following treatment goals: Weakness of both legs    The Patient and his wife were provided with a choice of provider and agrees   with the discharge plan. Yes [x] No []    Freedom of choice list was provided with basic dialogue that supports the patient's individualized plan of care/goals and shares the quality data associated with the providers. Yes [x] No []    Acute Rehab Transition:  Patient has been accepted to [facility] for acute rehab at [address] and meets criteria for admission. Patient will transported by TBD and expected to leave at [facility]. Communication to Rehab:  Bedside RN, has been notified to update the transition plan to the facility and call report [phone]     Discharge information has been updated on the AVS and sent via [facility] and/or CC link. Discharge instructions to be fax'd to facility at [phone]. Please include all hard scripts for controlled substances, med rec and dc summary, and AVS in packet. Please medicate for pain prior to dc if possible and needed to help offset delay when patient first arrives to facility. Reviewed and confirmed with facility, [facility], who can manage the patient care needs for the following:     Myriam Samples with (X) only those applicable:  Medication:  [x]Medications are available at the facility  []IV Antibiotics    []Controlled Substance  hard copies available sent.   []Weekly Labs    Equipment:  []CPAP/BiPAP  []Wound Vacuum  []Posadas or Urinary Device  []PICC/Central Line  []Nebulizer  []Ventilator    Treatment:  []Isolation (for MRSA, VRE, etc.)  []Surgical Drain Management  []Tracheostomy Care  []Dressing Changes  []Dialysis with transportation  []PEG Care  []Oxygen  []Daily Weights for Heart Failure    Dietary:  []Any diet limitations  []Tube Feedings   []Total Parenteral Management (TPN)    Financial Resources:  [x]UAI Not required for Acute Rehab Transfer  []Medicaid Application Completed  []UAI Completed  []Level II Completed  [] Private pay individual who will not become financially eligible for Medicaid within 6 months from admission to a [address] [place] [address] facility. [] Individual refused to have screening conducted. Advanced Care Plan:  []Surrogate Decision Maker of Care  []POA  []Communicated Code Status and copy sent. Other:         Care Management Interventions  PCP Verified by CM: Yes  Transition of Care Consult (CM Consult):  Other(Acute rehab)  Physical Therapy Consult: Yes  Occupational Therapy Consult: Yes  Current Support Network: Lives with Spouse  Confirm Follow Up Transport: Family  The Plan for Transition of Care is Related to the Following Treatment Goals : Weakness of both legs  The Patient and/or Patient Representative was Provided with a Choice of Provider and Agrees with the Discharge Plan?: Yes  Name of the Patient Representative Who was Provided with a Choice of Provider and Agrees with the Discharge Plan: Heather Hudson  Freedom of Choice List was Provided with Basic Dialogue that Supports the Patient's Individualized Plan of Care/Goals, Treatment Preferences and Shares the Quality Data Associated with the Providers?: Yes  Discharge Location  Discharge Placement: Rehab hospital/unit acute

## 2021-01-13 PROBLEM — M10.9 ACUTE GOUT OF RIGHT ANKLE: Status: ACTIVE | Noted: 2021-01-13

## 2021-01-13 LAB
BACTERIA SPEC CULT: NORMAL
BACTERIA SPEC CULT: NORMAL
BASOPHILS # BLD: 0 K/UL (ref 0–0.1)
BASOPHILS NFR BLD: 0 % (ref 0–2)
CK SERPL-CCNC: 149 U/L (ref 39–308)
DIFFERENTIAL METHOD BLD: ABNORMAL
EOSINOPHIL # BLD: 0 K/UL (ref 0–0.4)
EOSINOPHIL NFR BLD: 0 % (ref 0–5)
ERYTHROCYTE [DISTWIDTH] IN BLOOD BY AUTOMATED COUNT: 16 % (ref 11.6–14.5)
HCT VFR BLD AUTO: 33.8 % (ref 36–48)
HGB BLD-MCNC: 10.7 G/DL (ref 13–16)
LYMPHOCYTES # BLD: 0.8 K/UL (ref 0.9–3.6)
LYMPHOCYTES NFR BLD: 5 % (ref 21–52)
MAGNESIUM SERPL-MCNC: 2.7 MG/DL (ref 1.6–2.6)
MCH RBC QN AUTO: 28.8 PG (ref 24–34)
MCHC RBC AUTO-ENTMCNC: 31.7 G/DL (ref 31–37)
MCV RBC AUTO: 91.1 FL (ref 74–97)
MONOCYTES # BLD: 0.8 K/UL (ref 0.05–1.2)
MONOCYTES NFR BLD: 5 % (ref 3–10)
NEUTS SEG # BLD: 15.3 K/UL (ref 1.8–8)
NEUTS SEG NFR BLD: 90 % (ref 40–73)
PLATELET # BLD AUTO: 221 K/UL (ref 135–420)
PMV BLD AUTO: 9.9 FL (ref 9.2–11.8)
RBC # BLD AUTO: 3.71 M/UL (ref 4.7–5.5)
SERVICE CMNT-IMP: NORMAL
SERVICE CMNT-IMP: NORMAL
WBC # BLD AUTO: 16.9 K/UL (ref 4.6–13.2)

## 2021-01-13 PROCEDURE — 74011250637 HC RX REV CODE- 250/637: Performed by: PSYCHIATRY & NEUROLOGY

## 2021-01-13 PROCEDURE — 74011000258 HC RX REV CODE- 258: Performed by: INTERNAL MEDICINE

## 2021-01-13 PROCEDURE — 65270000029 HC RM PRIVATE

## 2021-01-13 PROCEDURE — 85025 COMPLETE CBC W/AUTO DIFF WBC: CPT

## 2021-01-13 PROCEDURE — 74011250636 HC RX REV CODE- 250/636: Performed by: INTERNAL MEDICINE

## 2021-01-13 PROCEDURE — 74011250637 HC RX REV CODE- 250/637: Performed by: HOSPITALIST

## 2021-01-13 PROCEDURE — 36415 COLL VENOUS BLD VENIPUNCTURE: CPT

## 2021-01-13 PROCEDURE — 82550 ASSAY OF CK (CPK): CPT

## 2021-01-13 PROCEDURE — 97530 THERAPEUTIC ACTIVITIES: CPT

## 2021-01-13 PROCEDURE — 74011636637 HC RX REV CODE- 636/637: Performed by: HOSPITALIST

## 2021-01-13 PROCEDURE — 83735 ASSAY OF MAGNESIUM: CPT

## 2021-01-13 PROCEDURE — 74011250636 HC RX REV CODE- 250/636: Performed by: HOSPITALIST

## 2021-01-13 RX ORDER — ALLOPURINOL 100 MG/1
TABLET ORAL DAILY
COMMUNITY

## 2021-01-13 RX ORDER — POTASSIUM CHLORIDE 1500 MG/1
20 TABLET, FILM COATED, EXTENDED RELEASE ORAL DAILY
COMMUNITY
End: 2021-01-14

## 2021-01-13 RX ORDER — FUROSEMIDE 40 MG/1
40 TABLET ORAL
Status: ON HOLD | COMMUNITY
End: 2021-01-14 | Stop reason: SDUPTHER

## 2021-01-13 RX ORDER — SACUBITRIL AND VALSARTAN 24; 26 MG/1; MG/1
1 TABLET, FILM COATED ORAL 2 TIMES DAILY
COMMUNITY
End: 2021-01-14

## 2021-01-13 RX ORDER — ALLOPURINOL 100 MG/1
100 TABLET ORAL 2 TIMES DAILY
Status: DISCONTINUED | OUTPATIENT
Start: 2021-01-13 | End: 2021-01-14 | Stop reason: HOSPADM

## 2021-01-13 RX ORDER — OMEPRAZOLE 20 MG/1
20 CAPSULE, DELAYED RELEASE ORAL DAILY
COMMUNITY

## 2021-01-13 RX ADMIN — MORPHINE SULFATE 1 MG: 2 INJECTION, SOLUTION INTRAMUSCULAR; INTRAVENOUS at 02:16

## 2021-01-13 RX ADMIN — OXYCODONE AND ACETAMINOPHEN 1 TABLET: 5; 325 TABLET ORAL at 06:40

## 2021-01-13 RX ADMIN — Medication 10 ML: at 06:42

## 2021-01-13 RX ADMIN — PIPERACILLIN AND TAZOBACTAM 3.38 G: 3; .375 INJECTION, POWDER, LYOPHILIZED, FOR SOLUTION INTRAVENOUS at 12:24

## 2021-01-13 RX ADMIN — DOCUSATE SODIUM 100 MG: 100 CAPSULE, LIQUID FILLED ORAL at 08:19

## 2021-01-13 RX ADMIN — BACLOFEN 5 MG: 10 TABLET ORAL at 22:08

## 2021-01-13 RX ADMIN — BACLOFEN 5 MG: 10 TABLET ORAL at 08:19

## 2021-01-13 RX ADMIN — Medication 10 ML: at 22:08

## 2021-01-13 RX ADMIN — HEPARIN SODIUM 5000 UNITS: 5000 INJECTION INTRAVENOUS; SUBCUTANEOUS at 02:13

## 2021-01-13 RX ADMIN — ALLOPURINOL 100 MG: 100 TABLET ORAL at 22:07

## 2021-01-13 RX ADMIN — BACLOFEN 5 MG: 10 TABLET ORAL at 16:54

## 2021-01-13 RX ADMIN — PIPERACILLIN AND TAZOBACTAM 3.38 G: 3; .375 INJECTION, POWDER, LYOPHILIZED, FOR SOLUTION INTRAVENOUS at 20:03

## 2021-01-13 RX ADMIN — PIPERACILLIN AND TAZOBACTAM 3.38 G: 3; .375 INJECTION, POWDER, LYOPHILIZED, FOR SOLUTION INTRAVENOUS at 06:40

## 2021-01-13 RX ADMIN — PREDNISONE 40 MG: 20 TABLET ORAL at 08:19

## 2021-01-13 RX ADMIN — HEPARIN SODIUM 5000 UNITS: 5000 INJECTION INTRAVENOUS; SUBCUTANEOUS at 08:20

## 2021-01-13 RX ADMIN — HEPARIN SODIUM 5000 UNITS: 5000 INJECTION INTRAVENOUS; SUBCUTANEOUS at 16:54

## 2021-01-13 RX ADMIN — OXYCODONE AND ACETAMINOPHEN 2 TABLET: 5; 325 TABLET ORAL at 12:24

## 2021-01-13 RX ADMIN — PANTOPRAZOLE SODIUM 40 MG: 40 TABLET, DELAYED RELEASE ORAL at 06:40

## 2021-01-13 NOTE — ROUTINE PROCESS
Bedside and Verbal shift change report given to CAMELIA Rangel (oncoming nurse) by Andria Flood (offgoing nurse). Report included the following information SBAR, Kardex and MAR.

## 2021-01-13 NOTE — PROGRESS NOTES
Comprehensive Nutrition Assessment    Type and Reason for Visit: (P) Initial, RD nutrition re-screen/LOS    Nutrition Recommendations/Plan: Other: Continue w/ POC    Nutrition Assessment:  (P) pt admitted w/ weakness, rhabdo, elevated cr, fall at home PTA, HTN, ankle pain improving, gout        Estimated Daily Nutrient Needs:  Energy (kcal): (P) 1980-5054; Weight Used for Energy Requirements: (P) Ideal  Protein (g): (P) 76-91; Weight Used for Protein Requirements: (P) Ideal(1.0-1.2)  Fluid (ml/day): (P) 5128-8042; Method Used for Fluid Requirements: (P) 1 ml/kcal      Nutrition Related Findings:         Wounds:    (P) None(excoriation noted)       Current Nutrition Therapies:  DIET CARDIAC Regular    Anthropometric Measures:  Height:  (P) 5' 10\" (177.8 cm)  Current Body Wt:  (P) 95.3 kg (210 lb)   Admission Body Wt:       Usual Body Wt:  (P) 102.1 kg (225 lb)(10/26/17)     Ideal Body Wt:  (P) 166 lbs:  (P) 126.5 %   Adjusted Body Weight:   ; Weight Adjustment for:     Adjusted BMI:       BMI Category:  (P) Obese class 1 (BMI 30.0-34. 9)       Nutrition Diagnosis:   (P) Other (specify)(At risk for inadequate oral intake) related to (P) other (specify)(LOS) as evidenced by (P) other (specify)(Day [facility] at [facility])    Nutrition Interventions:   Food and/or Nutrient Delivery: (P) Continue current diet  Nutrition Education and Counseling: (P) No recommendations at this time  Coordination of Nutrition Care: (P) Continue to monitor while inpatient, Interdisciplinary rounds    Goals:  (P) Encourage PO intake >50% at most meals in the next 5-7days       Nutrition Monitoring and Evaluation:   Behavioral-Environmental Outcomes:    Food/Nutrient Intake Outcomes: (P) Diet advancement/tolerance, Food and nutrient intake  Physical Signs/Symptoms Outcomes: (P) Biochemical data, Skin, Weight, GI status    Discharge Planning:    (P) Continue current diet     Electronically signed by Bill Bach on 1/13/2021 at 1:17 PM

## 2021-01-13 NOTE — PROGRESS NOTES
Hospitalist Progress Note-critical care note     Patient: Andrzej Agarwal MRN: [id number]  CSN: [id number]    YOB: 1940  Age: 68 y.o. Sex: male    DOA: 1/7/2021 LOS:  LOS: 6 days            Chief complaint: rhabdo , uti , elevated cr, bilateral weakness     Assessment/Plan         Hospital Problems  Date Reviewed: 7/7/2014          Codes Class Noted POA    Traumatic rhabdomyolysis ([address] ICD-10-CM: T79. 6XXA  ICD-9-CM: 958.6  1/8/2021 Unknown        UTI (urinary tract infection) ICD-10-CM: N39.0  ICD-9-CM: 599.0  1/7/2021 Unknown        * (Principal) Weakness of both legs ICD-10-CM: R29.898  ICD-9-CM: 729.89  1/7/2021 Unknown        Status cardiac pacemaker ICD-10-CM: Z95.0  ICD-9-CM: V45.01  1/7/2021 Unknown        Elevated serum creatinine ICD-10-CM: R79.89  ICD-9-CM: 790.99  1/7/2021 Unknown        Fall at home ICD-10-CM: [address] [address]. [place], [place]  ICD-9-CM: E888.9, E849.0  1/7/2021 Unknown        HTN (hypertension) ICD-10-CM: I10  ICD-9-CM: 401.9  4/16/2013 Yes              Weakness   Improving ,will continue OT/PT    myelo gram per IR done, no high stenosis , L2 fracture   No a candidate for surgery   neuro on board-LP done no GBS indicated   Weakness better      Rhabd   Resolved   Due to recent fall     Elevated cr -mild improving   Resolved also      Cad so far stable      Pacemaker   Continue remain electrolytes balance         Fall at home   Fall precaution       HTN, accelerated  Hold meds for now due to border line bp     Ankle pain-improving   x-ray no fracture  Continue steroid for gout flares , tophi before ,will put allopurinol     Subjective: I want to go to rehab, I was on allopurinol before      received iv morphine,   Discussed with pt about weaning off iv pain meds-he agrees    Leukocytosis was due to steroid   Disposition :1-2 days   Review of systems:    General: No fevers or chills. Cardiovascular: No chest pain or pressure. No palpitations. Pulmonary: No shortness of breath. Gastrointestinal: No nausea, vomiting. Msk: leg weakness better     Vital signs/Intake and Output:  Visit Vitals  /64   Pulse 84   Temp 98.2 °F (36.8 °C)   Resp 16   Ht 5' 10\" (1.778 m)   Wt 95.4 kg (210 lb 4.8 oz)   SpO2 99%   BMI 30.17 kg/m²     Current Shift:  01/13 0701 - 01/13 1900  In: -   Out: 300 [Urine:300]  Last three shifts:  01/11 1901 - 01/13 0700  In: 2185.4 [I.V.:2185.4]  Out: 400 [Urine:400]    Physical Exam:  General: WD, WN. Alert, cooperative, no acute distress    HEENT: NC, Atraumatic. PERRLA, anicteric sclerae. Lungs: CTA Bilaterally. No Wheezing/Rhonchi/Rales. Heart:  Regular  rhythm,  No murmur, No Rubs, No Gallops  Abdomen: Soft, Non distended, Non tender. +Bowel sounds,   Extremities: No c/c, rt ankle mild swelling, no tenderness   Psych:   Not anxious or agitated. Neurologic:  No acute neurological deficit except bilateral LE weakness-4/5 now  ,sensation same           Labs: Results:       Chemistry Recent Labs     01/11/21  0135   *      K 4.2      CO2 21   BUN 26*   CREA 1.24   CA 8.3*   AGAP 12   BUCR 21*      TP 6.2*   ALB 2.1*   GLOB 4.1*   AGRAT 0.5*      CBC w/Diff Recent Labs     01/13/21 0225 01/12/21  0320 01/11/21  0135   WBC 16.9* 15.4* 13.8*   RBC 3.71* 4.05* 3.86*   HGB 10.7* 12.0* 11.6*   HCT 33.8* 38.0 35.6*    239 224   GRANS 90* 92* 82*   LYMPH 5* 5* 8*   EOS 0 0 2      Cardiac Enzymes Recent Labs     01/13/21 0225 01/12/21  0320    321*      Coagulation No results for input(s): PTP, INR, APTT, INREXT, INREXT in the last 72 hours. Lipid Panel No results found for: CHOL, CHOLPOCT, CHOLX, CHLST, CHOLV, 783020, HDL, HDLP, LDL, LDLC, DLDLP, 664472, VLDLC, VLDL, TGLX, TRIGL, TRIGP, TGLPOCT, CHHD, CHHDX   BNP No results for input(s): BNPP in the last 72 hours.    Liver Enzymes Recent Labs     01/11/21  0135   TP 6.2*   ALB 2.1*         Thyroid Studies No results found for: T4, T3U, TSH, TSHEXT, TSHEXT Procedures/imaging: see electronic medical records for all procedures/Xrays and details which were not copied into this note but were reviewed prior to creation of Plan    Xr Myelo Lumbar    Result Date: 1/7/2021  EXAM: LUMBAR SPINE MYELOGRAM AND POSTMYELOGRAM LUMBAR SPINE CT INDICATION: Bilateral lower extremity weakness, severe low back pain, prior lumbar fusion, possible cauda equina syndrome, cardiac pacing device unable to undergo MRI COMPARISON: Noncontrast CT lumbar spine earlier the same day TECHNIQUE: Written informed consent was obtained from the patient after thorough explanation of the procedure, risks, and benefits. The patient was placed prone upon the fluoroscopy table. The patient's back was prepped and draped in the normal sterile fashion. 1% lidocaine buffered with sodium bicarbonate was used for local anesthetic. Next using fluoroscopic guidance, a 22 gauge three and half-inch spinal needle was advanced into the thecal sac at the L3 level through laminectomy defect. Immediate CSF return was seen. Then approximately 15 cc of Isovue 200-M contrast was slowly instilled into the thecal sac under intermittent fluoroscopic observation. The stylet was replaced. The needle was removed. Hemostasis was achieved. There were no immediate complications. Multiple routine digital fluoroscopic images were then obtained. Patient was unable to tolerate upright imaging due to leg weakness. The patient was sent to the CT suite for a postmyelogram lumbar spine CT. Multiple axial CT images of the lumbar spine were performed. Sagittal and coronal reconstructions were also performed. One or more dose reduction techniques were used on this CT: automated exposure control, adjustment of the mAs and/or kVp according to patient's size, and iterative reconstruction techniques. The specific techniques utilized on this CT exam have been documented in the patient's electronic medical record.  Digital Imaging and Communications in Medicine (DICOM) format image data are available to nonaffiliated external healthcare facilities or entities on a secure, media free, reciprocally searchable basis with patient authorization for at least a 12-month period after this study. Fluoroscopy radiation dose (reference air kerma), in mGy: 56.50 GUIDANCE: Fluoroscopic guidance was used to position (and confirm the position of) the spinal needle. Image(s) saved in PACS: Fluoroscopy ____________________ FINDINGS: LUMBAR MYELOGRAM The patient has previously undergone spinal fusion of the L2-S1 levels with bipedicular screws and fusion rods. Accompanying laminectomy changes are present at L2-L5 levels. Additional interbody graft fusion has been performed of each of the disc levels. The L5-S1 interbody graft is from an anterior approach with interlocking screws. Slight L1-L2 retrolisthesis is present. Of note, upright imaging not able to be obtained. Patient had limited mobility on the table limiting overall myelographic evaluation. No high-grade central stenosis is seen. No hardware failure is seen. Lucency is associated with the right L2 pedicle screw exuberant paraspinal calcification/osteophyte is present at the L1-L2 junctional level. Please refer to the post-myelogram CT findings below for further details. POSTMYELOGRAM LUMBAR SPINE CT Post surgical changes from L2-S1 spinal fusion are again with bipedicular screws and interlocking fusion rods. Additional interbody graft fusion is present at each of the 4 disc levels with anterior approach for the L5-S1 interbody graft. Accompanying laminectomy changes are seen. Slight L1-L2 retrolisthesis is present. There is abnormal hypodensity associated with the superior lateral aspect of the L2 vertebral body with a somewhat oblique configuration. Slight depression of the superior endplate is present.  This hypodensity represents a fracture line and extends to the superior margin of the pedicle screw and further posteriorly through the right pedicle through the osseous graft fusion material. Adjacent hypodensity is present partially surrounding this right L2 pedicle screw. There may be an additional incomplete fracture line through the inferior aspect of the L1 spinous process. There is prominent curvilinear calcification representing callus formation and/or developing osteophyte along the lateral margins of this L1-2 disc space. No solid bridging bone is present. Other vertebral body heights are preserved. The conus medullaris is located at the L1-L2 level and has a normal appearance. No distal cord or conus impingement is seen. T12-L1 level: There is no central or foraminal stenosis. L1/2 level: Disc bulge is present slightly uncovered by the retrolisthesis. No central stenosis is present. Mild foraminal stenosis is present. L2/3 level: This level is a prior surgical fusion level. Thickened calcification is present along the posterior margin of the disc, perhaps representing ossification of posterior longitudinal ligament or osseous graft fusion material, unchanged. Minimal foraminal narrowing is present. No central stenosis is present. L3/4 level: This level is a prior surgical fusion level. There is no central or foraminal stenosis. L4/5 level: This level is a prior surgical fusion level. There is no central or foraminal stenosis. L5/S1 level: This level is a prior surgical fusion level. Hypertrophic changes produce moderate bilateral foraminal stenosis. No central stenosis is present. Partially visualized prostate is enlarged and impresses upon the base of the urinary bladder. There is abnormal bladder wall thickening present. Small hypodense outpouchings are seen laterally suggesting bladder diverticula. Atherosclerotic calcifications are present. ____________________     IMPRESSION: 1.  Postsurgical changes from prior L2-S1 instrument fusion with bipedicular screws and interlocking fusion rods as well as interbody graft fusion placement at each of the fusion levels including anterior approach L5-S1 grafts with interlocking screws. Accompanying laminectomies are seen. -No central stenosis at the postsurgical levels. No impingement upon the distal cord/conus. 2. Fracture involving the superior lateral aspect of the L2 vertebral body extending along the right L2 screw through the right pedicle. Fracture age is strictly indeterminate but suspect subacute. Prominent paraspinal callus formation and developing osteophyte. 3. Slight L1-L2 retrolisthesis without short-term interval change. Patient unable to tolerate upright or flexion/extension positioning for further evaluation. 4. No additional fracture is seen. 5. No high-grade central or foraminal stenosis. Additional mild multilevel degenerative changes throughout the lumbar spine , as described in detail in Findings section. 6. Abnormal bladder wall thickening which may reflect bladder outlet obstruction and/or cystitis. Associated small bilateral bladder diverticula. Ct Spine Lumb Wo Cont    Result Date: 1/7/2021  Examination: CT lumbar spine without contrast. HISTORY: Patient with increased weakness in bilateral lower extremities, progressive over several days. TECHNIQUE: CT imaging of the lumbar spine was performed in the axial plane utilizing both bone and soft tissue reconstruction algorithms. Standard coronal and sagittal reformatted images were performed by the technologist and are included in interpretation. One or more dose reduction techniques were used on this CT: automated exposure control, adjustment of the mAs and/or kVp according to patient size, and iterative reconstruction techniques. The specific techniques used on this CT exam have been documented in the patient's electronic medical record.   Digital Imaging and Communications in Medicine (DICOM) format image data are available to nonaffiliated external healthcare facilities or entities on a secure, media free, reciprocally searchable basis with patient authorization for at least a 12-month period after this study. COMPARISON: No relevant prior imaging is available for review or comparison. FINDINGS: There is mild dextrorotatory curvature of the thoracolumbar junction demonstrated. Sagittal reformatted imaging demonstrates mild anterolisthesis of L5 on S1. There are postoperative changes from posterior instrumented spinal fusion extending from L2-S1. Interlaminar fixation cages are noted at L2-L3, L3-L4, L4-L5, and L5-S1. Anterior plate and screw construct across the L5-S1 level is present. There are paired pedicular screws present at the posteriorly decompressed/fused levels. Slight medial position of the right L2 pedicle screw noted. L3-L5 pedicle screws appear centered within the respective pedicles. Minor anterior cortical penetration of the right S1 body screw noted. Vertebral body heights appear preserved. No compression fracture demonstrated. Sacrum is intact. Mild bilateral SI joint osteoarthritis. No suspicious appearing lytic or blastic osseous lesion. Secondary to beam hardening artifact incurred from the indwelling spinal instrumentation, contents of the spinal canal are not well assessed by this examination. Prominent disc osteophyte at the L2-L3 level noted with likely ventral impression of thecal sac; however detail is limited. No high-grade neuroforaminal stenosis demonstrated. Review retroperitoneal soft tissue structures demonstrates relatively diffuse aortobiiliac atherosclerotic vascular calcification without evidence of aneurysmal dilatation. No adenopathy or free pelvic fluid. IMPRESSION: 1. Postoperative changes from posterior instrumented spinal fusion, decompression, and interlaminar fixation L2-S1 without acute osseous abnormality demonstrated. 2. Limited assessment of the contents of the spinal canal secondary to extensive artifact from indwelling spinal instrumentation. No definite findings on this examination to suggest a high-grade osseous canal stenosis with the majority of the lumbar spine posteriorly decompressed. 3. No high-grade neural foraminal stenosis. Ct Spine Lumb W Cont    Result Date: 1/7/2021  EXAM: LUMBAR SPINE MYELOGRAM AND POSTMYELOGRAM LUMBAR SPINE CT INDICATION: Bilateral lower extremity weakness, severe low back pain, prior lumbar fusion, possible cauda equina syndrome, cardiac pacing device unable to undergo MRI COMPARISON: Noncontrast CT lumbar spine earlier the same day TECHNIQUE: Written informed consent was obtained from the patient after thorough explanation of the procedure, risks, and benefits. The patient was placed prone upon the fluoroscopy table. The patient's back was prepped and draped in the normal sterile fashion. 1% lidocaine buffered with sodium bicarbonate was used for local anesthetic. Next using fluoroscopic guidance, a 22 gauge three and half-inch spinal needle was advanced into the thecal sac at the L3 level through laminectomy defect. Immediate CSF return was seen. Then approximately 15 cc of Isovue 200-M contrast was slowly instilled into the thecal sac under intermittent fluoroscopic observation. The stylet was replaced. The needle was removed. Hemostasis was achieved. There were no immediate complications. Multiple routine digital fluoroscopic images were then obtained. Patient was unable to tolerate upright imaging due to leg weakness. The patient was sent to the CT suite for a postmyelogram lumbar spine CT. Multiple axial CT images of the lumbar spine were performed. Sagittal and coronal reconstructions were also performed. One or more dose reduction techniques were used on this CT: automated exposure control, adjustment of the mAs and/or kVp according to patient's size, and iterative reconstruction techniques.  The specific techniques utilized on this CT exam have been documented in the patient's electronic medical record. Digital Imaging and Communications in Medicine (DICOM) format image data are available to nonaffiliated external healthcare facilities or entities on a secure, media free, reciprocally searchable basis with patient authorization for at least a 12-month period after this study. Fluoroscopy radiation dose (reference air kerma), in mGy: 56.50 GUIDANCE: Fluoroscopic guidance was used to position (and confirm the position of) the spinal needle. Image(s) saved in PACS: Fluoroscopy ____________________ FINDINGS: LUMBAR MYELOGRAM The patient has previously undergone spinal fusion of the L2-S1 levels with bipedicular screws and fusion rods. Accompanying laminectomy changes are present at L2-L5 levels. Additional interbody graft fusion has been performed of each of the disc levels. The L5-S1 interbody graft is from an anterior approach with interlocking screws. Slight L1-L2 retrolisthesis is present. Of note, upright imaging not able to be obtained. Patient had limited mobility on the table limiting overall myelographic evaluation. No high-grade central stenosis is seen. No hardware failure is seen. Lucency is associated with the right L2 pedicle screw exuberant paraspinal calcification/osteophyte is present at the L1-L2 junctional level. Please refer to the post-myelogram CT findings below for further details. POSTMYELOGRAM LUMBAR SPINE CT Post surgical changes from L2-S1 spinal fusion are again with bipedicular screws and interlocking fusion rods. Additional interbody graft fusion is present at each of the 4 disc levels with anterior approach for the L5-S1 interbody graft. Accompanying laminectomy changes are seen. Slight L1-L2 retrolisthesis is present. There is abnormal hypodensity associated with the superior lateral aspect of the L2 vertebral body with a somewhat oblique configuration. Slight depression of the superior endplate is present.  This hypodensity represents a fracture line and extends to the superior margin of the pedicle screw and further posteriorly through the right pedicle through the osseous graft fusion material. Adjacent hypodensity is present partially surrounding this right L2 pedicle screw. There may be an additional incomplete fracture line through the inferior aspect of the L1 spinous process. There is prominent curvilinear calcification representing callus formation and/or developing osteophyte along the lateral margins of this L1-2 disc space. No solid bridging bone is present. Other vertebral body heights are preserved. The conus medullaris is located at the L1-L2 level and has a normal appearance. No distal cord or conus impingement is seen. T12-L1 level: There is no central or foraminal stenosis. L1/2 level: Disc bulge is present slightly uncovered by the retrolisthesis. No central stenosis is present. Mild foraminal stenosis is present. L2/3 level: This level is a prior surgical fusion level. Thickened calcification is present along the posterior margin of the disc, perhaps representing ossification of posterior longitudinal ligament or osseous graft fusion material, unchanged. Minimal foraminal narrowing is present. No central stenosis is present. L3/4 level: This level is a prior surgical fusion level. There is no central or foraminal stenosis. L4/5 level: This level is a prior surgical fusion level. There is no central or foraminal stenosis. L5/S1 level: This level is a prior surgical fusion level. Hypertrophic changes produce moderate bilateral foraminal stenosis. No central stenosis is present. Partially visualized prostate is enlarged and impresses upon the base of the urinary bladder. There is abnormal bladder wall thickening present. Small hypodense outpouchings are seen laterally suggesting bladder diverticula. Atherosclerotic calcifications are present. ____________________     IMPRESSION: 1.  Postsurgical changes from prior L2-S1 instrument fusion with bipedicular screws and interlocking fusion rods as well as interbody graft fusion placement at each of the fusion levels including anterior approach L5-S1 grafts with interlocking screws. Accompanying laminectomies are seen. -No central stenosis at the postsurgical levels. No impingement upon the distal cord/conus. 2. Fracture involving the superior lateral aspect of the L2 vertebral body extending along the right L2 screw through the right pedicle. Fracture age is strictly indeterminate but suspect subacute. Prominent paraspinal callus formation and developing osteophyte. 3. Slight L1-L2 retrolisthesis without short-term interval change. Patient unable to tolerate upright or flexion/extension positioning for further evaluation. 4. No additional fracture is seen. 5. No high-grade central or foraminal stenosis. Additional mild multilevel degenerative changes throughout the lumbar spine , as described in detail in Findings section. 6. Abnormal bladder wall thickening which may reflect bladder outlet obstruction and/or cystitis. Associated small bilateral bladder diverticula. Xr Chest Port    Result Date: 1/7/2021  EXAM: XR CHEST PORT CLINICAL INDICATION/HISTORY: sepsis   > Additional: None. COMPARISON: June 17, 2014 TECHNIQUE: Portable chest _______________ FINDINGS: SUPPORT DEVICES: Left subclavian approach cardiac pacer device is now seen with leads projecting over the right atrium, right ventricle, and coronary sinus. HEART AND MEDIASTINUM: Normal size and contour. Normal pulmonary vasculature. LUNGS AND PLEURAL SPACES: The lungs are well expanded and clear. No focal consolidation, effusion, or pneumothorax. BONY THORAX AND SOFT TISSUES: Left shoulder arthroplasty hardware is noted with degenerative changes present in both acromioclavicular joints. _______________     IMPRESSION: No active cardiopulmonary disease.       Sylvain Brown MD

## 2021-01-13 NOTE — ADVANCED PRACTICE NURSE
CNS in to assist with pt's medication history, according to the patient's report. Changed per pt report:   Valsartan to sacubitril-valsartan (Entresto) 24 mg - 26 mg TID  Lasix 80 mg 4 times daily - may need review. Removed as pt no longer takes anymore:  Aspirin   Fish oil  Prednisone      When asked about gout medications, allopurinol, he states he was originally prescribed it by a podiatrist, and understood it as a gout-maintenance medication. However, when he went to [facility], he was taken off of it. He does not now. His primary care provider is Dr. Deena Dodson with [facility].. Pt gives permission to call his office to for a medication list; a message was left at the office at [phone]. Addendum 3:26 PM: No reply from Dr. Chloe Carlson office. Pt states that his wife may also help to clarify medications. Called Shahriarclara Shelley at [phone](M). She informs that the pt takes:  Lasix 40 mg tab x2 in the morning, and 40 mg tab x1 at night  Ropinirole 3 mg tab TID PRN  Aldactone once daily, unsure dose. Allopurinol daily, unsure dose. Added these medications to PTA med list.   When asked about colchicine, she believes this is the medication that he had stopped taking for gout. Addendum 3:46 PM: Shahriar Karsten also comes to the unit to show the pt's medication bottles. She also brings the following:   Myrbetriq 25 mg ER tab x1 daily  Omeprazole 20 mg daily  Allopurinol 100 mg daily  Aldactone 25 mg daily    Medications were added to PTA med list and marked as complete.

## 2021-01-13 NOTE — ROUTINE PROCESS
No changes noted overnight,vss . Patient Vitals for the past 12 hrs: 
 Temp Pulse Resp BP SpO2  
01/13/21 0814 98 °F (36.7 °C) 80 16 104/67 95 % 01/13/21 0327 97.8 °F (36.6 °C) 81 15 104/61 95 % 01/12/21 2358 97.7 °F (36.5 °C) 80 18 106/73 93 % Bedside, Verbal and Written shift change report given to STEPHANIE Zhao RN (oncoming nurse) by Keyanna Ivy RN 
 (offgoing nurse). Report included the following information SBAR, Kardex and MAR.

## 2021-01-13 NOTE — PROGRESS NOTES
Problem: Mobility Impaired (Adult and Pediatric)  Goal: *Acute Goals and Plan of Care (Insert Text)  Description: Physical Therapy Goals  Initiated 1/9/2021 and to be accomplished within 3-7 day(s)  1. Patient will move from supine to sit and sit to supine  and roll side to side in bed with minimal assistance/contact guard assist.    2.  Patient will transfer from bed to chair and chair to bed with moderate assistance  using the least restrictive device. 3.  Patient will perform sit to stand with maximal assistance. Will add amb goals when appropriate     Outcome: Progressing Towards Goal   PHYSICAL THERAPY TREATMENT    Patient: Wallace Boykin (43 y.o. male)  Date: 1/13/2021  Diagnosis: UTI (urinary tract infection) [N39.0] Weakness of both legs    Precautions: Fall  PLOF: RW and W/C use at home    ASSESSMENT:  Pt very talkative requiring redirection to task. Log roll performed to the L with us of bed rail, Annemarie and increased time to carry out. ModA to sit up EOB. Pt with a slow posterior lean requiring manual assist and VC to shift center of gravity forward with head and shoulders. Total A to scoot to EOB. Bed elevated with each attempt to stand. Pt unable to stand erect and with posterior thighs against EOB. Pt able to assist with scooting laterally up to [facility] and max/totA. Log roll performed back to supine. Progression toward goals:   []      Improving appropriately and progressing toward goals  [x]      Improving slowly and progressing toward goals  []      Not making progress toward goals and plan of care will be adjusted     PLAN:  Patient continues to benefit from skilled intervention to address the above impairments. Continue treatment per established plan of care. Discharge Recommendations:  Inpatient Rehab  Further Equipment Recommendations for Discharge:  N/A     SUBJECTIVE:   Patient stated I would like to keep working with you.     OBJECTIVE DATA SUMMARY:   Critical Behavior:  Neurologic State: Alert  Orientation Level: Oriented X4  Cognition: Appropriate decision making  Safety/Judgement: Awareness of environment  Functional Mobility Training:  Bed Mobility:  Rolling: Minimum assistance; Additional time  Supine to Sit: Moderate assistance; Additional time  Sit to Supine: Moderate assistance;Maximum assistance; Additional time  Scooting: Maximum assistance; Total assistance; Additional time  Transfers:  Sit to Stand: Moderate assistance;Maximum assistance  Stand to Sit: Moderate assistance  Balance:  Sitting: Impaired; With support  Sitting - Static: Fair (occasional)(-)  Sitting - Dynamic: Fair (occasional)(-)  Standing: Impaired; With support  Standing - Static: Poor  Standing - Dynamic : Not tested   Pain:  Pain level pre-treatment: 5/10  Pain level post-treatment: 5/10   Pain Intervention(s): Medication (see MAR); Rest, Ice, Repositioning   Response to intervention: Nurse notified, See doc flow    Activity Tolerance:   Fair, very motivated  Please refer to the flowsheet for vital signs taken during this treatment. After treatment:   [] Patient left in no apparent distress sitting up in chair  [x] Patient left in no apparent distress in bed  [x] Call bell left within reach  [] Nursing notified  [x] Caregiver present  [] Bed alarm activated  [] SCDs applied      COMMUNICATION/EDUCATION:   [x]         Role of Physical Therapy in the acute care setting. [x]         Fall prevention education was provided and the patient/caregiver indicated understanding. [x]         Patient/family have participated as able in working toward goals and plan of care. [x]         Patient/family agree to work toward stated goals and plan of care. []         Patient understands intent and goals of therapy, but is neutral about his/her participation.   []         Patient is unable to participate in stated goals/plan of care: ongoing with therapy staff.  []         Other:        Emmanuel Strickland, PTA   Time Calculation: 35 mins

## 2021-01-13 NOTE — PROGRESS NOTES
Physician Progress Note      Ariel Cano  CSN #:                  [id number]  :                       1940  ADMIT DATE:       2021 2:58 PM  [address] [place] DATE:  RESPONDING  PROVIDER #:        Jaleesa REMY MD          QUERY TEXT:    Pt admitted with weakness of both legs. Noted documentation of sepsis due to uti by ED 1- progress note. .  If possible, please document in progress notes and discharge summary:    The medical record reflects the following:  Risk Factors: UTI  Clinical Indicators: WBC 21, , + for UTI. , Documented BY ED Sepsis d/t UTI. Treatment: Rocephin, 1000ml NS IV bolus    Thank- You  Ambrocio Khan RN -[phone]  Options provided:  -- Sepsis confirmed present on admission  -- Sepsis ruled out  -- Other - I will add my own diagnosis  -- Disagree - Not applicable / Not valid  -- Disagree - Clinically unable to determine / Unknown  -- Refer to Clinical Documentation Reviewer    PROVIDER RESPONSE TEXT:    The diagnosis of Sepsis was ruled out. Query created by: Milana Joshi on 2021 7:55 AM      QUERY TEXT:    Pt admitted with weakness bilateral legs,   Pt noted to have Elevated CK documented . If possible, please document in progress notes and discharge summary if you are evaluating and/or treating any of the following: The medical record reflects the following:  Risk Factors: Fall  Clinical Indicators: Fall , CK   Treatment: 1000 ml NS bolus. Per [email or web address]  Traumatic rhabdomyolysis cause examples: crush syndrome, prolonged immobilization  Nontraumatic rhabdomyolysis cause examples:  marked exertion, hyperthermia, metabolic myopathy, drugs or toxins, infections, electrolyte disorders.     Thank- You  Ambrocio Khan RN -[phone]  Options provided:  -- Traumatic rhabdomyolysis  -- Nontraumatic rhabdomyolysis  -- Rhabdomyolysis  ruled out  -- Other - I will add my own diagnosis  -- Disagree - Not applicable / Not valid  -- Disagree - Clinically unable to determine / Unknown  -- Refer to Clinical Documentation Reviewer    PROVIDER RESPONSE TEXT:    This patient has traumatic rhabdomyolysis.     Query created by: Wendy Hodge on 1/8/2021 8:04 AM      Electronically signed by:  Silvana Rodriguez MD 1/13/2021 2:31 PM

## 2021-01-13 NOTE — PROGRESS NOTES
58 -- Assumed c/o pt from [facility]. Summary -- Pt comfortable throughout shift. CC R leg/foot pain which responds very well to PO percocet. Allergy hx updated by pharm as pt has been tolerating percocet well throughout admission. Enjoyed visit with family this afternoon. No new concerns/complaints noted. Patient Vitals for the past 12 hrs:   Temp Pulse Resp BP SpO2   01/13/21 1952 98 °F (36.7 °C) 80 18 104/72 100 %   01/13/21 1648 98.4 °F (36.9 °C) 86 16 104/70 99 %   01/13/21 1154 98.2 °F (36.8 °C) 84 16 106/64 99 %     Bedside shift change report given to Sherlyn Bloom RN (oncoming nurse) by Olvin Gaines RN (offgoing nurse). Report included the following information SBAR, Kardex, ED Summary, Intake/Output, MAR and Recent Results.

## 2021-01-13 NOTE — PROGRESS NOTES
4605 Bedside and verbal shift change report given to Catracho Lincoln RN (on coming nurse) by Christin Lawrence RN (off going nurse). Report included the following information SBAR, Kardex, OR Summary, Intake/Output and MAR.    1520 Bedside and verbal shift change report given by Catracho Lincoln RN (off going nurse) to Naun Calderon RN(on coming nurse). Report included the following information SBAR, Kardex, OR Summary, Intake/Output and MAR.

## 2021-01-14 VITALS
SYSTOLIC BLOOD PRESSURE: 119 MMHG | BODY MASS INDEX: 30.11 KG/M2 | OXYGEN SATURATION: 98 % | WEIGHT: 210.3 LBS | RESPIRATION RATE: 18 BRPM | DIASTOLIC BLOOD PRESSURE: 74 MMHG | HEART RATE: 80 BPM | TEMPERATURE: 97.4 F | HEIGHT: 70 IN

## 2021-01-14 LAB
BASOPHILS # BLD: 0 K/UL (ref 0–0.1)
BASOPHILS NFR BLD: 0 % (ref 0–2)
CREAT SERPL-MCNC: 1.14 MG/DL (ref 0.6–1.3)
DIFFERENTIAL METHOD BLD: ABNORMAL
EOSINOPHIL # BLD: 0 K/UL (ref 0–0.4)
EOSINOPHIL NFR BLD: 0 % (ref 0–5)
ERYTHROCYTE [DISTWIDTH] IN BLOOD BY AUTOMATED COUNT: 16.2 % (ref 11.6–14.5)
HCT VFR BLD AUTO: 34.3 % (ref 36–48)
HGB BLD-MCNC: 10.9 G/DL (ref 13–16)
LYMPHOCYTES # BLD: 1.1 K/UL (ref 0.9–3.6)
LYMPHOCYTES NFR BLD: 8 % (ref 21–52)
MCH RBC QN AUTO: 29.4 PG (ref 24–34)
MCHC RBC AUTO-ENTMCNC: 31.8 G/DL (ref 31–37)
MCV RBC AUTO: 92.5 FL (ref 74–97)
MONOCYTES # BLD: 0.7 K/UL (ref 0.05–1.2)
MONOCYTES NFR BLD: 6 % (ref 3–10)
NEUTS SEG # BLD: 11.6 K/UL (ref 1.8–8)
NEUTS SEG NFR BLD: 86 % (ref 40–73)
PLATELET # BLD AUTO: 237 K/UL (ref 135–420)
PMV BLD AUTO: 9.6 FL (ref 9.2–11.8)
RBC # BLD AUTO: 3.71 M/UL (ref 4.7–5.5)
WBC # BLD AUTO: 13.4 K/UL (ref 4.6–13.2)

## 2021-01-14 PROCEDURE — 36415 COLL VENOUS BLD VENIPUNCTURE: CPT

## 2021-01-14 PROCEDURE — 97530 THERAPEUTIC ACTIVITIES: CPT

## 2021-01-14 PROCEDURE — 82565 ASSAY OF CREATININE: CPT

## 2021-01-14 PROCEDURE — 74011250637 HC RX REV CODE- 250/637: Performed by: PSYCHIATRY & NEUROLOGY

## 2021-01-14 PROCEDURE — 74011636637 HC RX REV CODE- 636/637: Performed by: HOSPITALIST

## 2021-01-14 PROCEDURE — 74011250637 HC RX REV CODE- 250/637: Performed by: HOSPITALIST

## 2021-01-14 PROCEDURE — 85025 COMPLETE CBC W/AUTO DIFF WBC: CPT

## 2021-01-14 PROCEDURE — 74011250636 HC RX REV CODE- 250/636: Performed by: HOSPITALIST

## 2021-01-14 PROCEDURE — 74011250636 HC RX REV CODE- 250/636: Performed by: INTERNAL MEDICINE

## 2021-01-14 PROCEDURE — 97110 THERAPEUTIC EXERCISES: CPT

## 2021-01-14 PROCEDURE — 74011000258 HC RX REV CODE- 258: Performed by: INTERNAL MEDICINE

## 2021-01-14 RX ORDER — SAME BUTANEDISULFONATE/BETAINE 400-600 MG
500 POWDER IN PACKET (EA) ORAL 2 TIMES DAILY
Qty: 28 CAP | Refills: 0 | Status: SHIPPED | OUTPATIENT
Start: 2021-01-14 | End: 2021-01-21

## 2021-01-14 RX ORDER — LEVOFLOXACIN 500 MG/1
500 TABLET, FILM COATED ORAL DAILY
Qty: 5 TAB | Refills: 0 | Status: SHIPPED | OUTPATIENT
Start: 2021-01-14 | End: 2021-01-19

## 2021-01-14 RX ORDER — OXYCODONE AND ACETAMINOPHEN 5; 325 MG/1; MG/1
1 TABLET ORAL
Qty: 4 TAB | Refills: 0 | Status: SHIPPED | OUTPATIENT
Start: 2021-01-14 | End: 2021-01-15

## 2021-01-14 RX ORDER — FUROSEMIDE 40 MG/1
20 TABLET ORAL DAILY
Qty: 15 TAB | Refills: 0 | Status: SHIPPED
Start: 2021-01-14 | End: 2021-02-13

## 2021-01-14 RX ORDER — BACLOFEN 5 MG/1
5 TABLET ORAL 3 TIMES DAILY
Qty: 10 TAB | Refills: 0 | Status: SHIPPED | OUTPATIENT
Start: 2021-01-14 | End: 2021-01-19

## 2021-01-14 RX ORDER — OXYCODONE AND ACETAMINOPHEN 5; 325 MG/1; MG/1
1 TABLET ORAL
Qty: 4 TAB | Refills: 0 | Status: SHIPPED | OUTPATIENT
Start: 2021-01-14 | End: 2021-01-14 | Stop reason: SDUPTHER

## 2021-01-14 RX ORDER — SAME BUTANEDISULFONATE/BETAINE 400-600 MG
500 POWDER IN PACKET (EA) ORAL 2 TIMES DAILY
Status: DISCONTINUED | OUTPATIENT
Start: 2021-01-14 | End: 2021-01-14 | Stop reason: HOSPADM

## 2021-01-14 RX ADMIN — OXYCODONE AND ACETAMINOPHEN 2 TABLET: 5; 325 TABLET ORAL at 16:27

## 2021-01-14 RX ADMIN — BACLOFEN 5 MG: 10 TABLET ORAL at 16:27

## 2021-01-14 RX ADMIN — PANTOPRAZOLE SODIUM 40 MG: 40 TABLET, DELAYED RELEASE ORAL at 06:55

## 2021-01-14 RX ADMIN — PREDNISONE 40 MG: 20 TABLET ORAL at 08:34

## 2021-01-14 RX ADMIN — ALLOPURINOL 100 MG: 100 TABLET ORAL at 08:35

## 2021-01-14 RX ADMIN — DOCUSATE SODIUM 100 MG: 100 CAPSULE, LIQUID FILLED ORAL at 08:34

## 2021-01-14 RX ADMIN — Medication 500 MG: at 10:36

## 2021-01-14 RX ADMIN — BACLOFEN 5 MG: 10 TABLET ORAL at 08:33

## 2021-01-14 RX ADMIN — HEPARIN SODIUM 5000 UNITS: 5000 INJECTION INTRAVENOUS; SUBCUTANEOUS at 08:34

## 2021-01-14 RX ADMIN — OXYCODONE AND ACETAMINOPHEN 1 TABLET: 5; 325 TABLET ORAL at 05:04

## 2021-01-14 RX ADMIN — HEPARIN SODIUM 5000 UNITS: 5000 INJECTION INTRAVENOUS; SUBCUTANEOUS at 00:50

## 2021-01-14 RX ADMIN — Medication 10 ML: at 06:00

## 2021-01-14 RX ADMIN — PIPERACILLIN AND TAZOBACTAM 3.38 G: 3; .375 INJECTION, POWDER, LYOPHILIZED, FOR SOLUTION INTRAVENOUS at 14:27

## 2021-01-14 RX ADMIN — HEPARIN SODIUM 5000 UNITS: 5000 INJECTION INTRAVENOUS; SUBCUTANEOUS at 16:26

## 2021-01-14 RX ADMIN — PIPERACILLIN AND TAZOBACTAM 3.38 G: 3; .375 INJECTION, POWDER, LYOPHILIZED, FOR SOLUTION INTRAVENOUS at 08:33

## 2021-01-14 RX ADMIN — PIPERACILLIN AND TAZOBACTAM 3.38 G: 3; .375 INJECTION, POWDER, LYOPHILIZED, FOR SOLUTION INTRAVENOUS at 01:54

## 2021-01-14 NOTE — ROUTINE PROCESS
Bedside and Verbal shift change report given to Dalila Ortega RN (oncoming nurse) by Lisandro Lloyd RN(offgoing nurse). Report included the following information SBAR and Kardex.

## 2021-01-14 NOTE — PROGRESS NOTES
Problem: Mobility Impaired (Adult and Pediatric)  Goal: *Acute Goals and Plan of Care (Insert Text)  Description: Physical Therapy Goals  Initiated 1/9/2021 and to be accomplished within 3-7 day(s)  1. Patient will move from supine to sit and sit to supine  and roll side to side in bed with minimal assistance/contact guard assist.    2.  Patient will transfer from bed to chair and chair to bed with moderate assistance  using the least restrictive device. 3.  Patient will perform sit to stand with maximal assistance. Will add amb goals when appropriate     Outcome: Progressing Towards Goal   PHYSICAL THERAPY TREATMENT    Patient: Sasha Cartagena (89 y.o. male)  Date: 1/14/2021  Diagnosis: UTI (urinary tract infection) [N39.0] Weakness of both legs    Precautions: Fall  PLOF: w/c and RW use    ASSESSMENT:  Pt performed log roll with VC and Annemarie, modA to sit up EOB. Bed elevated to assist with sit to stand and maxA. Pt unable to stand erect and tolerated standing for less than 20 seconds each time. Sit to stand performed 4x with rest breaks in between each attempt. Seated (B)LEs exercises performed and cervical and shoulder exercises performed to improve posture. ModA back to supine. Increased edema noted in R foot/ankle. Progression toward goals:   []      Improving appropriately and progressing toward goals  [x]      Improving slowly and progressing toward goals  []      Not making progress toward goals and plan of care will be adjusted     PLAN:  Patient continues to benefit from skilled intervention to address the above impairments. Continue treatment per established plan of care. Discharge Recommendations:  Inpatient Rehab  Further Equipment Recommendations for Discharge:  TBD     SUBJECTIVE:   Patient stated My R leg feel heavy today.     OBJECTIVE DATA SUMMARY:   Critical Behavior:  Neurologic State: Eyes open spontaneously  Orientation Level: Oriented to person, Oriented to place, Oriented to situation  Cognition: Follows commands  Safety/Judgement: Awareness of environment  Functional Mobility Training:  Bed Mobility:  Rolling: Minimum assistance  Supine to Sit: Moderate assistance  Sit to Supine: Moderate assistance  Scooting: Moderate assistance  Transfers:  Sit to Stand: Maximum assistance  Stand to Sit: Moderate assistance  Balance:  Sitting: Impaired; With support  Sitting - Static: Fair (occasional)  Sitting - Dynamic: Fair (occasional)  Standing: Impaired; With support  Standing - Static: Poor  Standing - Dynamic : Not tested  Therapeutic Exercises:         EXERCISE   Sets   Reps   Active Active Assist   Passive Self ROM   Comments   Cervical retraction  5     Hold for 5 sec   Cervical extension  5        Shoulder retraction  10                                      Hip Add  10 [x] [] [] [] Hold for 5 secs, w/ pillow squeeze   Long Arc Quads  5 [x] [] [] []    Seated Marching  5 [x] [] [] []    Standing Marching   [] [] [] []       [] [] [] []        Pain:  Pain level pre-treatment: 3/10  Pain level post-treatment: 3/10   Pain Intervention(s): Medication (see MAR); Rest, Ice, Repositioning   Response to intervention: Nurse notified, See doc flow    Activity Tolerance:   Fair, very motivated  Please refer to the flowsheet for vital signs taken during this treatment. After treatment:   [] Patient left in no apparent distress sitting up in chair  [x] Patient left in no apparent distress in bed  [x] Call bell left within reach  [] Nursing notified  [] Caregiver present  [] Bed alarm activated  [] SCDs applied      COMMUNICATION/EDUCATION:   [x]         Role of Physical Therapy in the acute care setting. [x]         Fall prevention education was provided and the patient/caregiver indicated understanding. [x]         Patient/family have participated as able in working toward goals and plan of care. [x]         Patient/family agree to work toward stated goals and plan of care.   []         Patient understands intent and goals of therapy, but is neutral about his/her participation.   []         Patient is unable to participate in stated goals/plan of care: ongoing with therapy staff.  []         Other:        Monisha Barrios, DOMINIQUE   Time Calculation: 45 mins

## 2021-01-14 NOTE — DISCHARGE SUMMARY
Discharge Summary    Patient: Delma Delatorre MRN: [id number]  CSN: [id number]    YOB: 1940  Age: 68 y.o. Sex: male    DOA: 1/7/2021 LOS:  LOS: 7 days   Discharge Date:      Primary Care Provider:  Gilbert Mendoza MD    Admission Diagnoses: UTI (urinary tract infection) [N39.0]    Discharge Diagnoses:    Hospital Problems  Date Reviewed: 7/7/2014          Codes Class Noted POA    Acute gout of right ankle ICD-10-CM: M10.9  ICD-9-CM: 274.01  1/13/2021 Unknown        Traumatic rhabdomyolysis ([facility] [address] ICD-10-CM: T79. 6XXA  ICD-9-CM: 958.6  1/8/2021 Unknown        UTI (urinary tract infection) ICD-10-CM: N39.0  ICD-9-CM: 599.0  1/7/2021 Unknown        * (Principal) Weakness of both legs ICD-10-CM: R29.898  ICD-9-CM: 729.89  1/7/2021 Unknown        Status cardiac pacemaker ICD-10-CM: Z95.0  ICD-9-CM: V45.01  1/7/2021 Unknown        Elevated serum creatinine ICD-10-CM: R79.89  ICD-9-CM: 790.99  1/7/2021 Unknown        Fall at home ICD-10-CM: [address] [place], [place]  ICD-9-CM: E888.9, E849.0  1/7/2021 Unknown        HTN (hypertension) ICD-10-CM: I10  ICD-9-CM: 401.9  4/16/2013 Yes              Discharge Condition: stable     Discharge Medications:     Current Discharge Medication List      START taking these medications    Details   baclofen 5 mg tab Take 5 mg by mouth three (3) times daily for 5 days. Qty: 10 Tab, Refills: 0      oxyCODONE-acetaminophen (PERCOCET) 5-325 mg per tablet Take 1 Tab by mouth every six (6) hours as needed for Pain for up to 1 day. Max Daily Amount: 4 Tabs. Qty: 4 Tab, Refills: 0    Associated Diagnoses: Acute midline low back pain with bilateral sciatica      Saccharomyces boulardii (FLORASTOR) 250 mg capsule Take 2 Caps by mouth two (2) times a day for 7 days. Qty: 28 Cap, Refills: 0      levoFLOXacin (Levaquin) 500 mg tablet Take 1 Tab by mouth daily for 5 days.   Qty: 5 Tab, Refills: 0         CONTINUE these medications which have CHANGED    Details   furosemide (Lasix) 40 mg tablet Take 0.5 Tabs by mouth daily for 30 days. 40 mg tablet x2 in the morning and 40 mg tablet x1 at night  Qty: 15 Tab, Refills: 0         CONTINUE these medications which have NOT CHANGED    Details   allopurinoL (ZYLOPRIM) 100 mg tablet Take  by mouth daily. mirabegron ER (Myrbetriq) 25 mg ER tablet Take 25 mg by mouth daily. omeprazole (PRILOSEC) 20 mg capsule Take 20 mg by mouth daily. rOPINIRole (REQUIP) 3 mg tab tab Take 3 mg by mouth three (3) times daily as needed (restless leg syndrome). Indications: restless legs syndrome, an extreme discomfort in the calf muscles when sitting or lying down      spironolactone (ALDACTONE) 25 mg tablet Take 25 mg by mouth daily. rosuvastatin (CRESTOR) 10 mg tablet Take 10 mg by mouth nightly. multivitamin (ONE A DAY) tablet Take 1 Tab by mouth daily. STOP taking these medications       sacubitriL-valsartan (Entresto) 24-26 mg tablet Comments:   Reason for Stopping:         potassium chloride SR (K-TAB) 20 mEq tablet Comments:   Reason for Stopping:         potassium chloride (K-DUR, KLOR-CON) 20 mEq tablet Comments:   Reason for Stopping:         magnesium oxide (MAG-OX) 400 mg tablet Comments:   Reason for Stopping:         HYDROcodone-acetaminophen (NORCO) 5-325 mg per tablet Comments:   Reason for Stopping:               Procedures : none     Consults: Neurology and orthopedics       PHYSICAL EXAM   Visit Vitals  /65 (BP 1 Location: Left arm, BP Patient Position: At rest)   Pulse 79   Temp 97.4 °F (36.3 °C)   Resp 18   Ht 5' 10\" (1.778 m)   Wt 95.4 kg (210 lb 4.8 oz)   SpO2 98%   BMI 30.17 kg/m²     General: Awake, cooperative, no acute distress    HEENT: NC, Atraumatic. PERRLA, EOMI. Anicteric sclerae. Lungs:  CTA Bilaterally. No Wheezing/Rhonchi/Rales. Heart:  Regular  rhythm,  No murmur, No Rubs, No Gallops  Abdomen: Soft, Non distended, Non tender.  +Bowel sounds,   Extremities: No c/c/e  Psych:   Not anxious or agitated. Neurologic:  No acute neurological deficits. 4/5 strength in bilateral legs. Admission HPI :   Jeff Blanca is a 68 y.o. male with cad, chf ,chronic spinal stenosis was sent to ER after fall last weekend. He fell from 13 stairs and landed on his stomach, not reported any other injury except some broken skin on legs, no LOC. Wife help him up and he had been doing fine till two days later weakness in bilateral LE, left greater than RT. Denies any urinary incontinence. he had back surgery last June per [place] neurosurgeon. He also was found uti. Orthopedics was consulted and recommend to have myelogram due to not a candidate for mri spine s/p pace maker. ct spine:No definite findings on this  examination to suggest a high-grade osseous canal stenosis with the majority of the lumbar spine posteriorly decompressed.     No chest pain, no sob, no fever     Hospital Course :   Jeff Blanca is a 68 y.o. male with cad, chf ,chronic spinal stenosis was admitted for bilateral leg weakness. Since he was admitted, fall precaution and pain controled started. Neurologist and orthopedics were on board and baclofen was added with PT/OT. Myelogram of spine was done per IR done, no high stenosis , L2 fracture. He is not a candidate for surgery. LP was done no GBS indicated. He had rhabdomyolysis,diuretics was hold due to boarderline bp and dehydration. He received iv hydration and his rhabdo resolved. We can restart statin and start home medication gradually as bp can tolerated as out-pt. He also gout flares and he received steroid and flares resolved. He also has uti, received zosyn for 5 days and continue levaquin. He has PSEUDOMONAS AERUGINOSA , need repeat urine cx after treatment to make sure cured          Discharge planning discussed with patient, pt agrees  with the plan and no questions and concerns at this point.        Activity: Activity as tolerated    Diet: Cardiac Diet    Follow-up: PCP     Disposition: rehab     Minutes spent on discharge: 45 min       Labs: Results:       Chemistry Recent Labs     01/14/21 0303   CREA 1.14      CBC w/Diff Recent Labs     01/14/21 0303 01/13/21 0225 01/12/21  0320   WBC 13.4* 16.9* 15.4*   RBC 3.71* 3.71* 4.05*   HGB 10.9* 10.7* 12.0*   HCT 34.3* 33.8* 38.0    221 239   GRANS 86* 90* 92*   LYMPH 8* 5* 5*   EOS 0 0 0      Cardiac Enzymes Recent Labs     01/13/21 0225 01/12/21  0320    321*      Coagulation No results for input(s): PTP, INR, APTT, INREXT in the last 72 hours. Lipid Panel No results found for: CHOL, CHOLPOCT, CHOLX, CHLST, CHOLV, 313264, HDL, HDLP, LDL, LDLC, DLDLP, 561231, VLDLC, VLDL, TGLX, TRIGL, TRIGP, TGLPOCT, CHHD, CHHDX   BNP No results for input(s): BNPP in the last 72 hours. Liver Enzymes No results for input(s): TP, ALB, TBIL, AP in the last 72 hours. No lab exists for component: SGOT, GPT, DBIL   Thyroid Studies No results found for: T4, T3U, TSH, TSHEXT       @micro    Significant Diagnostic Studies: Xr Spinal Punc Lumb Dx    Result Date: 1/9/2021  PROCEDURE:  FLUOROSCOPIC GUIDED LUMBAR PUNCTURE INDICATION:  Bilateral leg weakness, possible given for a syndrome. Obtained CSF for analysis. _______________________________ TECHNIQUE/FINDINGS:  After the procedure as well as its risks, benefits and alternatives was explained to the patient, and all questions answered, verbal informed consent was obtained directly by the patient at the bedside. Examination performed with standard aseptic technique. Using fluoroscopy for guidance a lumbar puncture was performed at the L3 level through a laminectomy defect with a 22 gauge spinal needle after local anesthesia. CSF was clear and colorless. CSF was withdrawn for the requested laboratory evaluation. Total of 12 mL of CSF was obtained. Standard post procedure pause. Patient tolerated the procedure well and there were no immediate complications. Preprocedure timeout:  1446 hours. Radiation dose (reference air kerma):  20.4 mGy. GUIDANCE: Fluoroscopy guidance was used to position (and confirm the position of) the needle. Image(s) saved in PACS: Fluoroscopy _________________________________     IMPRESSION: 1. Successful fluoroscopic guided diagnostic lumbar puncture. 12 cc of fluid in 4 separate vials was sent to the lab for analysis. Xr Myelo Lumbar    Result Date: 1/7/2021  EXAM: LUMBAR SPINE MYELOGRAM AND POSTMYELOGRAM LUMBAR SPINE CT INDICATION: Bilateral lower extremity weakness, severe low back pain, prior lumbar fusion, possible cauda equina syndrome, cardiac pacing device unable to undergo MRI COMPARISON: Noncontrast CT lumbar spine earlier the same day TECHNIQUE: Written informed consent was obtained from the patient after thorough explanation of the procedure, risks, and benefits. The patient was placed prone upon the fluoroscopy table. The patient's back was prepped and draped in the normal sterile fashion. 1% lidocaine buffered with sodium bicarbonate was used for local anesthetic. Next using fluoroscopic guidance, a 22 gauge three and half-inch spinal needle was advanced into the thecal sac at the L3 level through laminectomy defect. Immediate CSF return was seen. Then approximately 15 cc of Isovue 200-M contrast was slowly instilled into the thecal sac under intermittent fluoroscopic observation. The stylet was replaced. The needle was removed. Hemostasis was achieved. There were no immediate complications. Multiple routine digital fluoroscopic images were then obtained. Patient was unable to tolerate upright imaging due to leg weakness. The patient was sent to the CT suite for a postmyelogram lumbar spine CT. Multiple axial CT images of the lumbar spine were performed. Sagittal and coronal reconstructions were also performed.   One or more dose reduction techniques were used on this CT: automated exposure control, adjustment of the mAs and/or kVp according to patient's size, and iterative reconstruction techniques. The specific techniques utilized on this CT exam have been documented in the patient's electronic medical record. Digital Imaging and Communications in Medicine (DICOM) format image data are available to nonaffiliated external healthcare facilities or entities on a secure, media free, reciprocally searchable basis with patient authorization for at least a 12-month period after this study. Fluoroscopy radiation dose (reference air kerma), in mGy: 56.50 GUIDANCE: Fluoroscopic guidance was used to position (and confirm the position of) the spinal needle. Image(s) saved in PACS: Fluoroscopy ____________________ FINDINGS: LUMBAR MYELOGRAM The patient has previously undergone spinal fusion of the L2-S1 levels with bipedicular screws and fusion rods. Accompanying laminectomy changes are present at L2-L5 levels. Additional interbody graft fusion has been performed of each of the disc levels. The L5-S1 interbody graft is from an anterior approach with interlocking screws. Slight L1-L2 retrolisthesis is present. Of note, upright imaging not able to be obtained. Patient had limited mobility on the table limiting overall myelographic evaluation. No high-grade central stenosis is seen. No hardware failure is seen. Lucency is associated with the right L2 pedicle screw exuberant paraspinal calcification/osteophyte is present at the L1-L2 junctional level. Please refer to the post-myelogram CT findings below for further details. POSTMYELOGRAM LUMBAR SPINE CT Post surgical changes from L2-S1 spinal fusion are again with bipedicular screws and interlocking fusion rods. Additional interbody graft fusion is present at each of the 4 disc levels with anterior approach for the L5-S1 interbody graft. Accompanying laminectomy changes are seen. Slight L1-L2 retrolisthesis is present.  There is abnormal hypodensity associated with the superior lateral aspect of the L2 vertebral body with a somewhat oblique configuration. Slight depression of the superior endplate is present. This hypodensity represents a fracture line and extends to the superior margin of the pedicle screw and further posteriorly through the right pedicle through the osseous graft fusion material. Adjacent hypodensity is present partially surrounding this right L2 pedicle screw. There may be an additional incomplete fracture line through the inferior aspect of the L1 spinous process. There is prominent curvilinear calcification representing callus formation and/or developing osteophyte along the lateral margins of this L1-2 disc space. No solid bridging bone is present. Other vertebral body heights are preserved. The conus medullaris is located at the L1-L2 level and has a normal appearance. No distal cord or conus impingement is seen. T12-L1 level: There is no central or foraminal stenosis. L1/2 level: Disc bulge is present slightly uncovered by the retrolisthesis. No central stenosis is present. Mild foraminal stenosis is present. L2/3 level: This level is a prior surgical fusion level. Thickened calcification is present along the posterior margin of the disc, perhaps representing ossification of posterior longitudinal ligament or osseous graft fusion material, unchanged. Minimal foraminal narrowing is present. No central stenosis is present. L3/4 level: This level is a prior surgical fusion level. There is no central or foraminal stenosis. L4/5 level: This level is a prior surgical fusion level. There is no central or foraminal stenosis. L5/S1 level: This level is a prior surgical fusion level. Hypertrophic changes produce moderate bilateral foraminal stenosis. No central stenosis is present. Partially visualized prostate is enlarged and impresses upon the base of the urinary bladder. There is abnormal bladder wall thickening present.  Small hypodense outpouchings are seen laterally suggesting bladder diverticula. Atherosclerotic calcifications are present. ____________________     IMPRESSION: 1. Postsurgical changes from prior L2-S1 instrument fusion with bipedicular screws and interlocking fusion rods as well as interbody graft fusion placement at each of the fusion levels including anterior approach L5-S1 grafts with interlocking screws. Accompanying laminectomies are seen. -No central stenosis at the postsurgical levels. No impingement upon the distal cord/conus. 2. Fracture involving the superior lateral aspect of the L2 vertebral body extending along the right L2 screw through the right pedicle. Fracture age is strictly indeterminate but suspect subacute. Prominent paraspinal callus formation and developing osteophyte. 3. Slight L1-L2 retrolisthesis without short-term interval change. Patient unable to tolerate upright or flexion/extension positioning for further evaluation. 4. No additional fracture is seen. 5. No high-grade central or foraminal stenosis. Additional mild multilevel degenerative changes throughout the lumbar spine , as described in detail in Findings section. 6. Abnormal bladder wall thickening which may reflect bladder outlet obstruction and/or cystitis. Associated small bilateral bladder diverticula. Xr Ankle Rt Min 3 V    Result Date: 1/10/2021  EXAM: XR ANKLE RT MIN 3 V CLINICAL INDICATION/HISTORY: fall -Additional: None COMPARISON: Same day foot radiographs as well as from January 2, 2019. TECHNIQUE: 3 views of the right ankle. _______________ FINDINGS: BONES: Assessment is somewhat limited due to suboptimal positioning. Alignment is grossly anatomic. Degenerative changes are noted with tiny marginal osteophytes. Degenerative changes about the midfoot are noted. SOFT TISSUES: Small ankle joint effusion with mild generalized periarticular soft tissue swelling, more pronounced laterally. Faint vascular calcifications. No radio opaque foreign body.  _______________ IMPRESSION: 1. No acute osseous abnormality. Generalized soft tissue swelling with evidence of a small joint effusion. Xr Foot Rt Ap/lat    Result Date: 1/10/2021  EXAM: XR FOOT RT AP/LAT CLINICAL INDICATION/HISTORY: fall -Additional: None COMPARISON: Right foot radiographs dated January 2, 2019 TECHNIQUE: 2 views of the right foot _______________ FINDINGS: BONES: Mild hallux valgus alignment with slight lateralization of the sesamoids. The digits are slightly hyperextended. Mild degenerative changes noted in the midfoot. Bony mineralization is normal. No fractures. SOFT TISSUES: Generalized soft tissue swelling about the forefoot. No radiopaque foreign bodies. Peripheral vascular calcifications are present. _______________     IMPRESSION: 1. No acute osseous abnormality. 2.  Generalized soft tissue swelling about the forefoot, nonspecific but may related to trauma or cellulitis. Ct Head Wo Cont    Result Date: 1/9/2021  EXAM: CT head INDICATION: Lower extremity weakness. COMPARISON: 10/12/2018. TECHNIQUE: Axial CT imaging of the head was performed without intravenous contrast. One or more dose reduction techniques were used on this CT: automated exposure control, adjustment of the mAs and/or kVp according to patient size, and iterative reconstruction techniques. The specific techniques used on this CT exam have been documented in the patient's electronic medical record. Digital Imaging and Communications in Medicine (DICOM) format image data are available to nonaffiliated external healthcare facilities or entities on a secure, media free, reciprocally searchable basis with patient authorization for at least a 12-month period after this study. _______________ FINDINGS: BRAIN:   There is some prominence of sulci consistent with atrophy most prominent in the perisylvian region.  The atrophy has probably progressed in the interval. Ventricular system is mildly prominent also larger in the interval. A single sulcus is prominent in the high right parietal region which could represent an arachnoid cyst in part, unchanged. The brainstem particularly the midbrain is substantially atrophic with a small kate and midbrain. Cerebellum is not particularly atrophic. There is no intracranial hemorrhage, mass effect, or midline shift. No definite new focal brain lesion. No hypodensity in cortex would be consistent with an acute cortical infarction. EXTRA-AXIAL SPACES AND MENINGES: There are no abnormal extra-axial fluid collections or mass. CALVARIUM: Intact. OTHER: Bilateral cataract surgery. _______________     IMPRESSION: 1No acute intracranial abnormalities. No hemorrhage, mass effect or CT evident acute cortical infarction. 2. Supratentorial atrophy as described. Progression of supratentorial loss of brain volume. 3. Substantial brainstem atrophy, as noted previously. No step definite CT correlate to the bilateral middle cerebellar peduncle abnormality noted on previous FLAIR sequence. Cerebellum is not as atrophic as the kate. Ct Spine Lumb Wo Cont    Result Date: 1/7/2021  Examination: CT lumbar spine without contrast. HISTORY: Patient with increased weakness in bilateral lower extremities, progressive over several days. TECHNIQUE: CT imaging of the lumbar spine was performed in the axial plane utilizing both bone and soft tissue reconstruction algorithms. Standard coronal and sagittal reformatted images were performed by the technologist and are included in interpretation. One or more dose reduction techniques were used on this CT: automated exposure control, adjustment of the mAs and/or kVp according to patient size, and iterative reconstruction techniques. The specific techniques used on this CT exam have been documented in the patient's electronic medical record.   Digital Imaging and Communications in Medicine (DICOM) format image data are available to nonaffiliated external healthcare facilities or entities on a secure, media free, reciprocally searchable basis with patient authorization for at least a 12-month period after this study. COMPARISON: No relevant prior imaging is available for review or comparison. FINDINGS: There is mild dextrorotatory curvature of the thoracolumbar junction demonstrated. Sagittal reformatted imaging demonstrates mild anterolisthesis of L5 on S1. There are postoperative changes from posterior instrumented spinal fusion extending from L2-S1. Interlaminar fixation cages are noted at L2-L3, L3-L4, L4-L5, and L5-S1. Anterior plate and screw construct across the L5-S1 level is present. There are paired pedicular screws present at the posteriorly decompressed/fused levels. Slight medial position of the right L2 pedicle screw noted. L3-L5 pedicle screws appear centered within the respective pedicles. Minor anterior cortical penetration of the right S1 body screw noted. Vertebral body heights appear preserved. No compression fracture demonstrated. Sacrum is intact. Mild bilateral SI joint osteoarthritis. No suspicious appearing lytic or blastic osseous lesion. Secondary to beam hardening artifact incurred from the indwelling spinal instrumentation, contents of the spinal canal are not well assessed by this examination. Prominent disc osteophyte at the L2-L3 level noted with likely ventral impression of thecal sac; however detail is limited. No high-grade neuroforaminal stenosis demonstrated. Review retroperitoneal soft tissue structures demonstrates relatively diffuse aortobiiliac atherosclerotic vascular calcification without evidence of aneurysmal dilatation. No adenopathy or free pelvic fluid. IMPRESSION: 1. Postoperative changes from posterior instrumented spinal fusion, decompression, and interlaminar fixation L2-S1 without acute osseous abnormality demonstrated.  2. Limited assessment of the contents of the spinal canal secondary to extensive artifact from indwelling spinal instrumentation. No definite findings on this examination to suggest a high-grade osseous canal stenosis with the majority of the lumbar spine posteriorly decompressed. 3. No high-grade neural foraminal stenosis. Ct Spine Lumb W Cont    Result Date: 1/7/2021  EXAM: LUMBAR SPINE MYELOGRAM AND POSTMYELOGRAM LUMBAR SPINE CT INDICATION: Bilateral lower extremity weakness, severe low back pain, prior lumbar fusion, possible cauda equina syndrome, cardiac pacing device unable to undergo MRI COMPARISON: Noncontrast CT lumbar spine earlier the same day TECHNIQUE: Written informed consent was obtained from the patient after thorough explanation of the procedure, risks, and benefits. The patient was placed prone upon the fluoroscopy table. The patient's back was prepped and draped in the normal sterile fashion. 1% lidocaine buffered with sodium bicarbonate was used for local anesthetic. Next using fluoroscopic guidance, a 22 gauge three and half-inch spinal needle was advanced into the thecal sac at the L3 level through laminectomy defect. Immediate CSF return was seen. Then approximately 15 cc of Isovue 200-M contrast was slowly instilled into the thecal sac under intermittent fluoroscopic observation. The stylet was replaced. The needle was removed. Hemostasis was achieved. There were no immediate complications. Multiple routine digital fluoroscopic images were then obtained. Patient was unable to tolerate upright imaging due to leg weakness. The patient was sent to the CT suite for a postmyelogram lumbar spine CT. Multiple axial CT images of the lumbar spine were performed. Sagittal and coronal reconstructions were also performed. One or more dose reduction techniques were used on this CT: automated exposure control, adjustment of the mAs and/or kVp according to patient's size, and iterative reconstruction techniques.  The specific techniques utilized on this CT exam have been documented in the patient's electronic medical record. Digital Imaging and Communications in Medicine (DICOM) format image data are available to nonaffiliated external healthcare facilities or entities on a secure, media free, reciprocally searchable basis with patient authorization for at least a 12-month period after this study. Fluoroscopy radiation dose (reference air kerma), in mGy: 56.50 GUIDANCE: Fluoroscopic guidance was used to position (and confirm the position of) the spinal needle. Image(s) saved in PACS: Fluoroscopy ____________________ FINDINGS: LUMBAR MYELOGRAM The patient has previously undergone spinal fusion of the L2-S1 levels with bipedicular screws and fusion rods. Accompanying laminectomy changes are present at L2-L5 levels. Additional interbody graft fusion has been performed of each of the disc levels. The L5-S1 interbody graft is from an anterior approach with interlocking screws. Slight L1-L2 retrolisthesis is present. Of note, upright imaging not able to be obtained. Patient had limited mobility on the table limiting overall myelographic evaluation. No high-grade central stenosis is seen. No hardware failure is seen. Lucency is associated with the right L2 pedicle screw exuberant paraspinal calcification/osteophyte is present at the L1-L2 junctional level. Please refer to the post-myelogram CT findings below for further details. POSTMYELOGRAM LUMBAR SPINE CT Post surgical changes from L2-S1 spinal fusion are again with bipedicular screws and interlocking fusion rods. Additional interbody graft fusion is present at each of the 4 disc levels with anterior approach for the L5-S1 interbody graft. Accompanying laminectomy changes are seen. Slight L1-L2 retrolisthesis is present. There is abnormal hypodensity associated with the superior lateral aspect of the L2 vertebral body with a somewhat oblique configuration. Slight depression of the superior endplate is present.  This hypodensity represents a fracture line and extends to the superior margin of the pedicle screw and further posteriorly through the right pedicle through the osseous graft fusion material. Adjacent hypodensity is present partially surrounding this right L2 pedicle screw. There may be an additional incomplete fracture line through the inferior aspect of the L1 spinous process. There is prominent curvilinear calcification representing callus formation and/or developing osteophyte along the lateral margins of this L1-2 disc space. No solid bridging bone is present. Other vertebral body heights are preserved. The conus medullaris is located at the L1-L2 level and has a normal appearance. No distal cord or conus impingement is seen. T12-L1 level: There is no central or foraminal stenosis. L1/2 level: Disc bulge is present slightly uncovered by the retrolisthesis. No central stenosis is present. Mild foraminal stenosis is present. L2/3 level: This level is a prior surgical fusion level. Thickened calcification is present along the posterior margin of the disc, perhaps representing ossification of posterior longitudinal ligament or osseous graft fusion material, unchanged. Minimal foraminal narrowing is present. No central stenosis is present. L3/4 level: This level is a prior surgical fusion level. There is no central or foraminal stenosis. L4/5 level: This level is a prior surgical fusion level. There is no central or foraminal stenosis. L5/S1 level: This level is a prior surgical fusion level. Hypertrophic changes produce moderate bilateral foraminal stenosis. No central stenosis is present. Partially visualized prostate is enlarged and impresses upon the base of the urinary bladder. There is abnormal bladder wall thickening present. Small hypodense outpouchings are seen laterally suggesting bladder diverticula. Atherosclerotic calcifications are present. ____________________     IMPRESSION: 1.  Postsurgical changes from prior L2-S1 instrument fusion with bipedicular screws and interlocking fusion rods as well as interbody graft fusion placement at each of the fusion levels including anterior approach L5-S1 grafts with interlocking screws. Accompanying laminectomies are seen. -No central stenosis at the postsurgical levels. No impingement upon the distal cord/conus. 2. Fracture involving the superior lateral aspect of the L2 vertebral body extending along the right L2 screw through the right pedicle. Fracture age is strictly indeterminate but suspect subacute. Prominent paraspinal callus formation and developing osteophyte. 3. Slight L1-L2 retrolisthesis without short-term interval change. Patient unable to tolerate upright or flexion/extension positioning for further evaluation. 4. No additional fracture is seen. 5. No high-grade central or foraminal stenosis. Additional mild multilevel degenerative changes throughout the lumbar spine , as described in detail in Findings section. 6. Abnormal bladder wall thickening which may reflect bladder outlet obstruction and/or cystitis. Associated small bilateral bladder diverticula. Xr Chest Port    Result Date: 1/7/2021  EXAM: XR CHEST PORT CLINICAL INDICATION/HISTORY: sepsis   > Additional: None. COMPARISON: June 17, 2014 TECHNIQUE: Portable chest _______________ FINDINGS: SUPPORT DEVICES: Left subclavian approach cardiac pacer device is now seen with leads projecting over the right atrium, right ventricle, and coronary sinus. HEART AND MEDIASTINUM: Normal size and contour. Normal pulmonary vasculature. LUNGS AND PLEURAL SPACES: The lungs are well expanded and clear. No focal consolidation, effusion, or pneumothorax. BONY THORAX AND SOFT TISSUES: Left shoulder arthroplasty hardware is noted with degenerative changes present in both acromioclavicular joints. _______________     IMPRESSION: No active cardiopulmonary disease.     Duplex Lower Ext Venous Bilat    Result Date: 1/11/2021  · No evidence of deep vein thrombosis in the right lower extremity. · No evidence of deep vein thrombosis in the left lower extremity. Luca Odonnell [facility]     CC:  Meek Ruiz MD

## 2021-01-14 NOTE — ROUTINE PROCESS
TRANSFER - OUT REPORT: 
 
Verbal report given to Kamari Benson RN (name) on Taz Hammer  being transferred to [facility] (unit) for routine progression of care Report consisted of patients Situation, Background, Assessment and  
Recommendations(SBAR). Information from the following report(s) SBAR, Kardex, ED Summary, Intake/Output, MAR and Recent Results was reviewed with the receiving nurse. Lines:  
Peripheral IV 01/11/21 Anterior;Distal;Left Forearm (Active) Site Assessment Clean, dry, & intact 01/14/21 3717 Phlebitis Assessment 0 01/14/21 0452 Infiltration Assessment 0 01/14/21 0452 Dressing Status Clean, dry, & intact 01/14/21 5396 Dressing Type Transparent;Tape 01/14/21 0452 Hub Color/Line Status Blue; Infusing 01/14/21 0452 Action Taken Open ports on tubing capped 01/14/21 0452 Alcohol Cap Used Yes 01/14/21 0452 Opportunity for questions and clarification was provided. Patient transported with: 
Paper Rx for percocet

## 2021-01-14 NOTE — ROUTINE PROCESS
Bedside and Verbal shift change report given to Kip Pal RN (oncoming nurse) by Robyn Lizarraga RN (offgoing nurse). Report included the following information SBAR and Kardex.

## 2021-01-14 NOTE — PROGRESS NOTES
Room #: 041/48     Age: 68 y.o. Code status: Full Code     Admission date: 1/7/2021     POD#: ***     GLOS: ***      Admitting MD: ***     Consults: *** A&Ox ***   Isolation: There are currently no Active Isolations     Precautions: *** Admission dx -  
Encounter Diagnoses ICD-10-CM ICD-9-CM 1. Acute cystitis without hematuria  N30.00 595.0 2. Sepsis due to urinary tract infection (HCC)  A41.9 038.9 N39.0 995.91  
  599.0 3. Weakness of both lower extremities  R29.898 729.89  
4. Acute midline low back pain with bilateral sciatica  M54.42 724.2 M54.41 724. 3 Allergies: No Active Allergies Plan - *** Patient Vitals for the past 12 hrs: 
 Temp Pulse Resp BP SpO2  
01/14/21 0337 98.9 °F (37.2 °C) 84 18 118/65 98 % 01/13/21 2237 98.3 °F (36.8 °C) 81 18 121/63 99 % 01/13/21 1952 98 °F (36.7 °C) 80 18 104/72 100 % PMH:  
Past Medical History:  
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 
CV - Tele: {YES (DEF)/NO:19887}     Box: ***     Rhythm: {Telemetry:96973865} Resp - O2: ***     Lung sounds: ***     IS: ***     GI/ - Urinary status: *** Abd - Last BM: ***     Bowel sounds: ***     Diet: DIET CARDIAC Skin - ***     Pain - *** Recent Results (from the past 12 hour(s)) CBC WITH AUTOMATED DIFF Collection Time: 01/14/21  3:03 AM  
Result Value Ref Range WBC 13.4 (H) 4.6 - 13.2 K/uL  
 RBC 3.71 (L) 4.70 - 5.50 M/uL  
 HGB 10.9 (L) 13.0 - 16.0 g/dL HCT 34.3 (L) 36.0 - 48.0 % MCV 92.5 74.0 - 97.0 FL  
 MCH 29.4 24.0 - 34.0 PG  
 MCHC 31.8 31.0 - 37.0 g/dL  
 RDW 16.2 (H) 11.6 - 14.5 % PLATELET 429 220 - 412 K/uL MPV 9.6 9.2 - 11.8 FL  
 NEUTROPHILS 86 (H) 40 - 73 % LYMPHOCYTES 8 (L) 21 - 52 % MONOCYTES 6 3 - 10 % EOSINOPHILS 0 0 - 5 % BASOPHILS 0 0 - 2 %  
 ABS. NEUTROPHILS 11.6 (H) 1.8 - 8.0 K/UL  
 ABS. LYMPHOCYTES 1.1 0.9 - 3.6 K/UL  
 ABS. MONOCYTES 0.7 0.05 - 1.2 K/UL  
 ABS. EOSINOPHILS 0.0 0.0 - 0.4 K/UL  
 ABS. BASOPHILS 0.0 0.0 - 0.1 K/UL  
 DF AUTOMATED    
CREATININE Collection Time: 01/14/21  3:03 AM  
Result Value Ref Range Creatinine 1.14 0.6 - 1.3 MG/DL  
 GFR est AA >60 >60 ml/min/1.73m2 GFR est non-AA >60 >60 ml/min/1.73m2 Radiology/other results: *** Current Facility-Administered Medications:  
  allopurinoL (ZYLOPRIM) tablet 100 mg, 100 mg, Oral, BID, Kobe Mauro MD, 100 mg at 01/13/21 2207   predniSONE (DELTASONE) tablet 40 mg, 40 mg, Oral, DAILY WITH BREAKFAST, Kobe Mauro MD, 40 mg at 01/13/21 0819 
  pantoprazole (PROTONIX) tablet 40 mg, 40 mg, Oral, ACB, Kobe Mauro MD, 40 mg at 01/14/21 3899   oxyCODONE-acetaminophen (PERCOCET) 5-325 mg per tablet 1-2 Tab, 1-2 Tab, Oral, Q6H PRN, Kobe Mauro MD, 1 Tab at 01/14/21 5678   piperacillin-tazobactam (ZOSYN) 3.375 g in 0.9% sodium chloride (MBP/ADV) 100 mL MBP, 3.375 g, IntraVENous, Q6H, Chapito Gracia MD, Last Rate: 200 mL/hr at 01/14/21 0154, 3.375 g at 01/14/21 0154   baclofen (LIORESAL) tablet 5 mg, 5 mg, Oral, TID, Cindy Dennis MD, 5 mg at 01/13/21 2208 
  0.9% sodium chloride infusion, 25 mL/hr, IntraVENous, CONTINUOUS, Kobe Mauro MD, Last Rate: 25 mL/hr at 01/12/21 2358, 25 mL/hr at 01/12/21 2358   sodium chloride (NS) flush 5-40 mL, 5-40 mL, IntraVENous, Q8H, Kobe Mauro MD, 10 mL at 01/14/21 0600 
  sodium chloride (NS) flush 5-40 mL, 5-40 mL, IntraVENous, PRN, Kobe Mauro MD 
  acetaminophen (TYLENOL) tablet 650 mg, 650 mg, Oral, Q6H PRN, 650 mg at 01/09/21 2138 **OR** acetaminophen (TYLENOL) suppository 650 mg, 650 mg, Rectal, Q6H PRN, Kobe Mauro MD 
   bisacodyL (DULCOLAX) suppository 10 mg, 10 mg, Rectal, DAILY PRN, Edgar Nath MD 
  promethazine (PHENERGAN) tablet 12.5 mg, 12.5 mg, Oral, Q6H PRN **OR** ondansetron (ZOFRAN) injection 4 mg, 4 mg, IntraVENous, Q6H PRN, Edgar Nath MD 
  heparin (porcine) injection 5,000 Units, 5,000 Units, SubCUTAneous, Q8H, Edgar Nath MD, 5,000 Units at 01/14/21 0728   docusate sodium (COLACE) capsule 100 mg, 100 mg, Oral, DAILY, Edgar Nath MD, 100 mg at 01/13/21 6594

## 2021-01-14 NOTE — ROUTINE PROCESS
Bedside and Verbal shift change report given to Prashant Dnog RN (oncoming nurse) by Venus Hayden RN (offgoing nurse). Report included the following information SBAR and Kardex.

## 2021-01-14 NOTE — PROGRESS NOTES
Transition of care to Acute Rehab: [facility]     Communication to Patient/Family:  Met with patient and family, and they are agreeable to the transition plan. The Plan for Transition of Care is related to the following treatment goals: Weakness of both legs     The Patient and his wife were provided with a choice of provider and agrees   with the discharge plan.  Yes [x]? No []?     Freedom of choice list was provided with basic dialogue that supports the patient's individualized plan of care/goals and shares the quality data associated with the providers.     Yes [x]? No []?     Acute Rehab Transition:  Patient has been accepted to [facility] for acute rehab at [address] and meets criteria for admission. Patient will transported by [facility] and expected to leave at 5:00 PM.     Communication to Rehab:  Bedside RN, Jignesh Bell, has been notified to update the transition plan to the facility and call report [phone]      Discharge information has been updated on the AVS and sent via [facility] and/or CC link. Discharge instructions to be fax'd to facility at [phone]. Please include all hard scripts for controlled substances, med rec and dc summary, and AVS in packet. Please medicate for pain prior to dc if possible and needed to help offset delay when patient first arrives to facility.     Reviewed and confirmed with facility, [facility], who can manage the patient care needs for the following:      Denis with (X) only those applicable:  Medication:  [x]? Medications are available at the facility  []? IV Antibiotics    []? Controlled Substance  hard copies available sent.  []? Weekly Labs     Equipment:  []?CPAP/BiPAP  []? Wound Vacuum  []? Posadas or Urinary Device  []? PICC/Central Line  []? Nebulizer  []? Ventilator     Treatment:  []? Isolation (for MRSA, VRE, etc.)  []? Surgical Drain Management  []? Tracheostomy Care  []? Dressing Changes  []? Dialysis with transportation  []? PEG Care  []? Oxygen  []? Daily Weights for Heart Failure     Dietary:  []? Any diet limitations  []? Tube Feedings   []? Total Parenteral Management (TPN)     Financial Resources:  [x]? UAI Not required for Acute Rehab Transfer  []? Medicaid Application Completed  []? UAI Completed  []? Level II Completed  []? Private pay individual who will not become financially eligible for Medicaid within 6 months from admission to a [address] [place] [address] facility.   []? Individual refused to have screening conducted.      Advanced Care Plan:  []? Surrogate Decision Maker of Care  []? POA  []? Communicated Code Status and copy sent.     Other:            Care Management Interventions  PCP Verified by CM: Yes  Transition of Care Consult (CM Consult):  Other(Acute rehab)  Physical Therapy Consult: Yes  Occupational Therapy Consult: Yes  Current Support Network: Lives with Spouse  Confirm Follow Up Transport: Family  The Plan for Transition of Care is Related to the Following Treatment Goals : Weakness of both legs  The Patient and/or Patient Representative was Provided with a Choice of Provider and Agrees with the Discharge Plan?: Yes  Name of the Patient Representative Who was Provided with a Choice of Provider and Agrees with the Discharge Plan: Philip Avitia  Freedom of Choice List was Provided with Basic Dialogue that Supports the Patient's Individualized Plan of Care/Goals, Treatment Preferences and Shares the Quality Data Associated with the Providers?: Yes  Discharge Location  Discharge Placement: Rehab hospital/unit acute

## 2021-01-14 NOTE — PROGRESS NOTES
Bedside and Verbal shift change report given to [address] (oncoming nurse) by STEPHANIE Foss (offgoing nurse). Report included the following information SBAR, Kardex, Intake/Output, MAR and Recent Results. Shift Summary- Pt A&Ox1. Pt had uneventful shift. Bedside and Verbal shift change report given to MAGI Green (oncoming nurse) by [address] (offgoing nurse). Report included the following information SBAR, Kardex, Intake/Output, MAR and Recent Results.

## 2021-01-14 NOTE — PROGRESS NOTES
Physician Progress Note      Karl Encinas  CSN #:                  [id number]  :                       1940  ADMIT DATE:       2021 2:58 PM  [address] DATE:  RESPONDING  PROVIDER #:        Blanka REMY MD          QUERY TEXT:    Pt admitted with weakness. Pt noted to have  L2 fracture and Traumatic rhabdomyolysis . If possible, please document in progress notes and discharge summary the relationship, if any, between BLE weakness and L2 fracture or Traumatic Rhabdomyolysis. The medical record reflects the following:  Risk Factors: Rhabdo, L2 fracture,    Clinical Indicators: Weakness of both legs, RLE spasms,  L2 fracture , Traumatic rhabdo, ce 2,090. Lumbar spine myelogram was finished and ruled out spinal stenosis except but vertebral body fracture. Treatment: IV hydration ,pain meds, myelo gram , LP    Thank- You  Parris Woods RN [facility] [phone]  Options provided:  -- BLE weakness due to Traumatic L2 fracture  -- BLE weakness due to Traumatic rhabdo  -- BLE weakness due to both traumatic rhabdo and traumatic L2 fracture  -- BLE weakness due to, Please document etiology.   -- Other - I will add my own diagnosis  -- Disagree - Not applicable / Not valid  -- Disagree - Clinically unable to determine / Unknown  -- Refer to Clinical Documentation Reviewer    PROVIDER RESPONSE TEXT:    BLE weakness was due to Traumatic rhabdo    Query created by: Jeevan Cao on 2021 5:57 AM      Electronically signed by:  Blanka Villalpando MD 2021 8:22 AM

## 2021-01-14 NOTE — PROGRESS NOTES
Problem: Pain  Goal: *Control of Pain  Outcome: Progressing Towards Goal     Problem: Patient Education: Go to Patient Education Activity  Goal: Patient/Family Education  Outcome: Progressing Towards Goal     Problem: Urinary Tract Infection - Adult  Goal: *Absence of infection signs and symptoms  Outcome: Progressing Towards Goal     Problem: Patient Education: Go to Patient Education Activity  Goal: Patient/Family Education  Outcome: Progressing Towards Goal     Problem: Falls - Risk of  Goal: *Absence of Falls  Description: Document Benja Fall Risk and appropriate interventions in the flowsheet. Outcome: Progressing Towards Goal  Note: Fall Risk Interventions:  Mobility Interventions: Assess mobility with egress test, Communicate number of staff needed for ambulation/transfer, OT consult for ADLs, Patient to call before getting OOB, PT Consult for mobility concerns, PT Consult for assist device competence, Utilize walker, cane, or other assistive device    Mentation Interventions: Adequate sleep, hydration, pain control, Increase mobility, Update white board    Medication Interventions: Evaluate medications/consider consulting pharmacy, Patient to call before getting OOB, Teach patient to arise slowly    Elimination Interventions: Call light in reach, Patient to call for help with toileting needs, Stay With Me (per policy), Toileting schedule/hourly rounds    History of Falls Interventions: Consult care management for discharge planning, Door open when patient unattended, Evaluate medications/consider consulting pharmacy, Investigate reason for fall         Problem: Patient Education: Go to Patient Education Activity  Goal: Patient/Family Education  Outcome: Progressing Towards Goal     Problem: Pressure Injury - Risk of  Goal: *Prevention of pressure injury  Description: Document Romero Scale and appropriate interventions in the flowsheet.   Outcome: Progressing Towards Goal  Note: Pressure Injury Interventions:  Sensory Interventions: Assess changes in LOC    Moisture Interventions: Absorbent underpads, Limit adult briefs    Activity Interventions: Pressure redistribution bed/mattress(bed type), PT/OT evaluation, Increase time out of bed    Mobility Interventions: Pressure redistribution bed/mattress (bed type), PT/OT evaluation    Nutrition Interventions: Document food/fluid/supplement intake, Offer support with meals,snacks and hydration    Friction and Shear Interventions: Lift sheet, Minimize layers                Problem: Patient Education: Go to Patient Education Activity  Goal: Patient/Family Education  Outcome: Progressing Towards Goal

## 2021-01-17 LAB
BACTERIA SPEC CULT: NORMAL
BACTERIA SPEC CULT: NORMAL
GRAM STN SPEC: NORMAL
SERVICE CMNT-IMP: NORMAL

## 2021-10-21 PROBLEM — D64.9 ANEMIA, UNSPECIFIED: Status: ACTIVE | Noted: 2021-10-21

## 2021-10-21 RX ORDER — HEPARIN 100 UNIT/ML
300-500 SYRINGE INTRAVENOUS AS NEEDED
Status: CANCELLED
Start: 2021-10-26

## 2021-10-21 RX ORDER — ACETAMINOPHEN 325 MG/1
650 TABLET ORAL AS NEEDED
Status: CANCELLED
Start: 2021-10-26

## 2021-10-21 RX ORDER — ONDANSETRON 2 MG/ML
8 INJECTION INTRAMUSCULAR; INTRAVENOUS AS NEEDED
Status: CANCELLED | OUTPATIENT
Start: 2021-10-26

## 2021-10-21 RX ORDER — DIPHENHYDRAMINE HYDROCHLORIDE 50 MG/ML
50 INJECTION, SOLUTION INTRAMUSCULAR; INTRAVENOUS AS NEEDED
Status: CANCELLED
Start: 2021-10-26

## 2021-10-21 RX ORDER — EPINEPHRINE 1 MG/ML
0.3 INJECTION, SOLUTION, CONCENTRATE INTRAVENOUS AS NEEDED
Status: CANCELLED | OUTPATIENT
Start: 2021-10-26

## 2021-10-21 RX ORDER — SODIUM CHLORIDE 9 MG/ML
10 INJECTION INTRAMUSCULAR; INTRAVENOUS; SUBCUTANEOUS AS NEEDED
Status: CANCELLED | OUTPATIENT
Start: 2021-10-26

## 2021-10-21 RX ORDER — ALBUTEROL SULFATE 0.83 MG/ML
2.5 SOLUTION RESPIRATORY (INHALATION) AS NEEDED
Status: CANCELLED
Start: 2021-10-26

## 2021-10-21 RX ORDER — HYDROCORTISONE SODIUM SUCCINATE 100 MG/2ML
100 INJECTION, POWDER, FOR SOLUTION INTRAMUSCULAR; INTRAVENOUS AS NEEDED
Status: CANCELLED | OUTPATIENT
Start: 2021-10-26

## 2021-10-21 RX ORDER — DIPHENHYDRAMINE HYDROCHLORIDE 50 MG/ML
25 INJECTION, SOLUTION INTRAMUSCULAR; INTRAVENOUS AS NEEDED
Status: CANCELLED
Start: 2021-10-26

## 2021-10-21 RX ORDER — SODIUM CHLORIDE 9 MG/ML
25 INJECTION, SOLUTION INTRAVENOUS CONTINUOUS
Status: CANCELLED | OUTPATIENT
Start: 2021-10-26

## 2021-10-26 ENCOUNTER — HOSPITAL ENCOUNTER (OUTPATIENT)
Dept: INFUSION THERAPY | Age: 81
Discharge: HOME | End: 2021-10-26
Payer: COMMERCIAL

## 2021-10-26 VITALS
SYSTOLIC BLOOD PRESSURE: 116 MMHG | RESPIRATION RATE: 18 BRPM | OXYGEN SATURATION: 98 % | DIASTOLIC BLOOD PRESSURE: 71 MMHG | TEMPERATURE: 98.2 F | HEART RATE: 80 BPM

## 2021-10-26 DIAGNOSIS — D64.9 ANEMIA, UNSPECIFIED TYPE: Primary | ICD-10-CM

## 2021-10-26 PROCEDURE — 96360 HYDRATION IV INFUSION INIT: CPT

## 2021-10-26 PROCEDURE — 96374 THER/PROPH/DIAG INJ IV PUSH: CPT

## 2021-10-26 PROCEDURE — 74011250636 HC RX REV CODE- 250/636: Performed by: INTERNAL MEDICINE

## 2021-10-26 PROCEDURE — 74011000258 HC RX REV CODE- 258: Performed by: INTERNAL MEDICINE

## 2021-10-26 RX ORDER — SODIUM CHLORIDE 0.9 % (FLUSH) 0.9 %
10 SYRINGE (ML) INJECTION AS NEEDED
Status: DISCONTINUED | OUTPATIENT
Start: 2021-10-26 | End: 2021-10-27 | Stop reason: HOSPADM

## 2021-10-26 RX ORDER — ALBUTEROL SULFATE 0.83 MG/ML
2.5 SOLUTION RESPIRATORY (INHALATION) AS NEEDED
Status: CANCELLED
Start: 2021-11-02

## 2021-10-26 RX ORDER — SODIUM CHLORIDE 9 MG/ML
25 INJECTION, SOLUTION INTRAVENOUS CONTINUOUS
Status: CANCELLED | OUTPATIENT
Start: 2021-11-02

## 2021-10-26 RX ORDER — SODIUM CHLORIDE 0.9 % (FLUSH) 0.9 %
10 SYRINGE (ML) INJECTION AS NEEDED
Status: CANCELLED | OUTPATIENT
Start: 2021-11-02

## 2021-10-26 RX ORDER — DIPHENHYDRAMINE HYDROCHLORIDE 50 MG/ML
25 INJECTION, SOLUTION INTRAMUSCULAR; INTRAVENOUS AS NEEDED
Status: CANCELLED
Start: 2021-11-02

## 2021-10-26 RX ORDER — DIPHENHYDRAMINE HYDROCHLORIDE 50 MG/ML
50 INJECTION, SOLUTION INTRAMUSCULAR; INTRAVENOUS AS NEEDED
Status: CANCELLED
Start: 2021-11-02

## 2021-10-26 RX ORDER — SODIUM CHLORIDE 9 MG/ML
10 INJECTION INTRAMUSCULAR; INTRAVENOUS; SUBCUTANEOUS AS NEEDED
Status: CANCELLED | OUTPATIENT
Start: 2021-11-02

## 2021-10-26 RX ORDER — HEPARIN 100 UNIT/ML
300-500 SYRINGE INTRAVENOUS AS NEEDED
Status: CANCELLED
Start: 2021-11-02

## 2021-10-26 RX ORDER — ACETAMINOPHEN 325 MG/1
650 TABLET ORAL AS NEEDED
Status: CANCELLED
Start: 2021-11-02

## 2021-10-26 RX ORDER — HYDROCORTISONE SODIUM SUCCINATE 100 MG/2ML
100 INJECTION, POWDER, FOR SOLUTION INTRAMUSCULAR; INTRAVENOUS AS NEEDED
Status: CANCELLED | OUTPATIENT
Start: 2021-11-02

## 2021-10-26 RX ORDER — EPINEPHRINE 1 MG/ML
0.3 INJECTION, SOLUTION, CONCENTRATE INTRAVENOUS AS NEEDED
Status: CANCELLED | OUTPATIENT
Start: 2021-11-02

## 2021-10-26 RX ORDER — ONDANSETRON 2 MG/ML
8 INJECTION INTRAMUSCULAR; INTRAVENOUS AS NEEDED
Status: CANCELLED | OUTPATIENT
Start: 2021-11-02

## 2021-10-26 RX ADMIN — Medication 10 ML: at 13:57

## 2021-10-26 RX ADMIN — IRON SUCROSE 200 MG: 20 INJECTION, SOLUTION INTRAVENOUS at 13:35

## 2021-10-26 NOTE — PROGRESS NOTES
[facility] Progress Note    Date: 2021    Name: Sharee Corley    MRN: [id number]         : 1940    Venofer Infusion    Mr. Lyubov Plascencia to [facility], ambulatory, at 1300. Pt was assessed and education was provided. Mr. Gloria Avitia vitals were reviewed and patient was observed for 5 minutes prior to treatment. Visit Vitals  BP (P) 112/68   Pulse (P) 79   Temp 98.2 °F (36.8 °C)   Resp (P) 18   SpO2 98%         24 g PIV placed in right anticubital x 1 attempt. PIV flushed easily and had brisk blood return. No pre-medications were ordered. Venofer 200 mg was initiated at 440 ml/hr for 15 minutes. Pt observed for 30 minutes post infusion. VS stable and pt denied complaints of itching, lip/tongue/facial swelling, SOB, CP or other complaints. Mr. Lyubov Plascencia tolerated the infusion, and had no complaints. VS remained stable. PIV flushed with NS 10 ml and removed. No bleeding or hematoma noted at site. Guaze and coban applied. Reviewed discharge instructions with patient, including expected side effects (abdominal cramping, nausea, changes in color of urine or feces) and signs of allergic reaction requiring medical attention (itching/hives/rashes, SOB, chest pain, lip/tongue/facial swelling). Patient given printed copy to take home. Patient verbalized understanding of discharge instructions. Patient armband removed and shredded. Mr. Lyubov Plascencia was discharged from [place] [address] in stable condition at 1420. He is to return on 21 at 1300 for 2/3 Venofer infusion.     Genaro Quinonez RN  2021  2:50 PM

## 2021-11-09 ENCOUNTER — HOSPITAL ENCOUNTER (OUTPATIENT)
Dept: INFUSION THERAPY | Age: 81
End: 2021-11-09